# Patient Record
Sex: FEMALE | Race: WHITE | NOT HISPANIC OR LATINO | Employment: OTHER | URBAN - NONMETROPOLITAN AREA
[De-identification: names, ages, dates, MRNs, and addresses within clinical notes are randomized per-mention and may not be internally consistent; named-entity substitution may affect disease eponyms.]

---

## 2017-01-27 ENCOUNTER — HOSPITAL ENCOUNTER (EMERGENCY)
Facility: HOSPITAL | Age: 47
Discharge: HOME | End: 2017-01-27
Attending: NURSE PRACTITIONER | Admitting: NURSE PRACTITIONER
Payer: COMMERCIAL

## 2017-01-27 VITALS
OXYGEN SATURATION: 97 % | DIASTOLIC BLOOD PRESSURE: 78 MMHG | SYSTOLIC BLOOD PRESSURE: 117 MMHG | TEMPERATURE: 98.1 F | RESPIRATION RATE: 16 BRPM

## 2017-01-27 DIAGNOSIS — G44.209 TENSION HEADACHE: ICD-10-CM

## 2017-01-27 PROCEDURE — 96372 THER/PROPH/DIAG INJ SC/IM: CPT

## 2017-01-27 PROCEDURE — 25000128 H RX IP 250 OP 636: Performed by: NURSE PRACTITIONER

## 2017-01-27 PROCEDURE — 99214 OFFICE O/P EST MOD 30 MIN: CPT | Mod: 25

## 2017-01-27 PROCEDURE — 25000125 ZZHC RX 250: Performed by: NURSE PRACTITIONER

## 2017-01-27 PROCEDURE — 99213 OFFICE O/P EST LOW 20 MIN: CPT | Performed by: NURSE PRACTITIONER

## 2017-01-27 RX ORDER — DIAZEPAM 5 MG
5 TABLET ORAL EVERY 6 HOURS PRN
Qty: 5 TABLET | Refills: 0 | Status: SHIPPED | OUTPATIENT
Start: 2017-01-27 | End: 2017-02-03

## 2017-01-27 RX ORDER — ONDANSETRON 4 MG/1
4 TABLET, ORALLY DISINTEGRATING ORAL ONCE
Status: COMPLETED | OUTPATIENT
Start: 2017-01-27 | End: 2017-01-27

## 2017-01-27 RX ORDER — DIAZEPAM 10 MG/2ML
10 INJECTION, SOLUTION INTRAMUSCULAR; INTRAVENOUS ONCE
Status: COMPLETED | OUTPATIENT
Start: 2017-01-27 | End: 2017-01-27

## 2017-01-27 RX ORDER — KETOROLAC TROMETHAMINE 30 MG/ML
60 INJECTION, SOLUTION INTRAMUSCULAR; INTRAVENOUS ONCE
Status: COMPLETED | OUTPATIENT
Start: 2017-01-27 | End: 2017-01-27

## 2017-01-27 RX ORDER — DEXAMETHASONE SODIUM PHOSPHATE 10 MG/ML
10 INJECTION, SOLUTION INTRAMUSCULAR; INTRAVENOUS ONCE
Status: COMPLETED | OUTPATIENT
Start: 2017-01-27 | End: 2017-01-27

## 2017-01-27 RX ADMIN — DEXAMETHASONE SODIUM PHOSPHATE 10 MG: 10 INJECTION INTRAMUSCULAR; INTRAVENOUS at 17:11

## 2017-01-27 RX ADMIN — ONDANSETRON 4 MG: 4 TABLET, ORALLY DISINTEGRATING ORAL at 17:11

## 2017-01-27 RX ADMIN — KETOROLAC TROMETHAMINE 60 MG: 30 INJECTION, SOLUTION INTRAMUSCULAR; INTRAVENOUS at 17:12

## 2017-01-27 RX ADMIN — DIAZEPAM 10 MG: 5 INJECTION, SOLUTION INTRAMUSCULAR; INTRAVENOUS at 17:12

## 2017-01-27 ASSESSMENT — ENCOUNTER SYMPTOMS
WEAKNESS: 0
PHOTOPHOBIA: 1
CHILLS: 0
APPETITE CHANGE: 0
FEVER: 0
HEADACHES: 1
FATIGUE: 0

## 2017-01-27 NOTE — ED AVS SNAPSHOT
[facility]    [address] [address]    Phone:  [phone]                                       Dilcia Santillan   MRN: [id number]    Department:  [facility]   Date of Visit:  1/27/2017           Patient Information     Date Of Birth          1970        Your diagnoses for this visit were:     Tension headache        You were seen by Vannessa Eduardo NP.      Follow-up Information     Follow up with [facility].    Specialty:  EMERGENCY MEDICINE    Why:  As needed, If symptoms worsen, or concerns develop    Contact information:    [address] [place] [address] [phone]    Additional information:    From [place]: Take [address]. Turn left at [address]. Turn left at the first stoplight on [address]. At the first stop sign, take a right onto [address]. Take a left into the parking lot and continue through until you reach the North enterance of the building.       From [place]: Take [address]. Take the [address] ramp towards [place]. Turn left onto [address]. Take a slight right onto [address]. Turn left at the first stoplight on [address]. At the first stop sign, take a right onto [address]. Take a left into the parking lot and continue through until you reach the North enterance of the building.       From [place]: Take [address]. Take a right at [address]. At the first stop sign, take a right onto [address]. Take a left into the parking lot and continue through until you reach the North enterance of the building.         Follow up with Rubin Navarrete MD.    Specialty:  Family Practice    Why:  As needed, if symptoms do not improve    Contact information:    [facility]  [address] [facility] [address]  [phone]          Discharge Instructions         * Tension Headache    Muscle Tension Headache  "(also called \"stress headache\") is a very common cause of head pain. Under stress, some people tense the muscles of their shoulder, neck and scalp without knowing it. If this lasts long enough, a headache can occur. These headaches can be very painful and last for hours or even days.  Home Care:    If you were given pain medicine for this headache, do not drive yourself home. Arrange for a ride, instead. When you get home, try to sleep. You should feel much better when you wake up.    Heat to the back of your neck may relieve neck spasm.    Drink only clear liquids or eat a very light diet to avoid nausea/vomiting until symptoms improve.  Preventing Future Headaches    Identify the sources of stress in your life. These may not be obvious! Learn new ways to handle your stress, such as regular exercise, biofeedback, self-hypnosis and meditation. For more information about this, consult your doctor or go to a local bookstore and review the many books and tapes on this subject.    At the first sign of a tension headache, take time out if possible. Remove yourself from the stressful situation, find a quiet comfortable place to sit or lie down and let yourself relax. Heat and deep massage of the tight areas in the neck and shoulders may help reduce muscle spasm. Medicine, such as ibuprofen (Advil or Motrin) or a prescribed muscle relaxant may be helpful at this point.  Follow Up with your doctor if the headache is not better within the next 24 hours. If you have frequent headaches you should discuss a treatment plan with your primary care doctor. Ask if you can have medicine to take at home the next time you get a bad headache. This may avoid the need for a visit to the emergency department in the future. Poorly controlled chronic headaches may require a referral to a neurologist (headache specialist).  Call your Doctor Or Get Prompt Medical Attention if any of the following occur:    Worsening of your head pain or no " improvement within 24 hours    Repeated vomiting (unable to keep liquids down)    Fever of 100.4 F (38.0 C) or higher, or as directed by your healthcare provider    Stiff neck    Extreme drowsiness, confusion or fainting    Dizziness, vertigo (dizziness with spinning sensation)    Weakness of an arm or leg or one side of the face    Difficulty with speech or vision    9973-7791 [facility], [address] [place] [address], [place] [address]. All rights reserved. This information is not intended as a substitute for professional medical care. Always follow your healthcare professional's instructions.             Review of your medicines      START taking        Dose / Directions Last dose taken    diazepam 5 MG tablet   Commonly known as:  VALIUM   Dose:  5 mg   Quantity:  5 tablet        Take 1 tablet (5 mg) by mouth every 6 hours as needed for muscle spasms   Refills:  0          Our records show that you are taking the medicines listed below. If these are incorrect, please call your family doctor or clinic.        Dose / Directions Last dose taken    AXERT PO        Take  by mouth at onset of headache.   Refills:  0        FLUoxetine 20 MG capsule   Commonly known as:  PROzac        TAKE 1 CAPSULE BY MOUTH DAILY   Refills:  0        levonorgestrel-ethinyl estradiol 0.15-0.03 MG per tablet   Commonly known as:  SEASONALE        Refills:  0        TOPAMAX PO   Dose:  100 mg        Take 100 mg by mouth 2 times daily.   Refills:  0        traMADol 50 MG tablet   Commonly known as:  ULTRAM        Take three per day as needed for pain.   Refills:  0        zolpidem 12.5 MG CR tablet   Commonly known as:  AMBIEN CR   Dose:  12.5 mg        Take 12.5 mg by mouth   Refills:  0                Prescriptions were sent or printed at these locations (1 Prescription)                   [facility] - [place], [place] - [address] AT [facility] [address], [place] [address]    Telephone:  [phone]  "  Fax:  [phone]   Hours:                  Printed at Department/Unit printer (1 of 1)         diazepam (VALIUM) 5 MG tablet                Orders Needing Specimen Collection     None      Pending Results     No orders found from 2017 to 2017.            Pending Culture Results     No orders found from 2017 to 2017.            Thank you for choosing [facility]       Thank you for choosing [facility] for your care. Our goal is always to provide you with excellent care. Hearing back from our patients is one way we can continue to improve our services. Please take a few minutes to complete the written survey that you may receive in the mail after you visit with us. Thank you!        [facility] Information     [facility] lets you send messages to your doctor, view your test results, renew your prescriptions, schedule appointments and more. To sign up, go to [email or web address] . Click on \"Log in\" on the left side of the screen, which will take you to the Welcome page. Then click on \"Sign up Now\" on the right side of the page.     You will be asked to enter the access code listed below, as well as some personal information. Please follow the directions to create your username and password.     Your access code is: [email or web address]  Expires: 2017  5:37 PM     Your access code will  in 90 days. If you need help or a new code, please call your [facility] clinic or [phone].        Care EveryWhere ID     This is your Care EveryWhere ID. This could be used by other organizations to access your [facility] medical records  [id number]        After Visit Summary       This is your record. Keep this with you and show to your community pharmacist(s) and doctor(s) at your next visit.                  "

## 2017-01-27 NOTE — ED PROVIDER NOTES
History     Chief Complaint   Patient presents with     Headache     c/o migraine headache on/off week     No  was used.     Dilcia Santillan is a 46 year old female who presents today with a CC of migraine off and on x 4-5 days.  She reports this is her typical migraine symptoms, long history of migraines.  She has been under a great deal of stress lately as her daughter has been undergoing medical testing recently - they have staying hotel rooms, not eating well, etc.  She c/o tense muscles in neck.      I have reviewed the Medications, Allergies, Past Medical and Surgical History, and Social History in the Epic system.    Review of Systems   Constitutional: Negative for fever, chills, appetite change and fatigue.   Eyes: Positive for photophobia. Negative for visual disturbance.   Neurological: Positive for headaches. Negative for syncope and weakness.       Physical Exam   BP: 117/78 mmHg  Heart Rate: 85  Temp: 98.1  F (36.7  C)  Resp: 16  SpO2: 97 %    Physical Exam   Constitutional: She is oriented to person, place, and time. She appears well-developed and well-nourished. She is cooperative. She appears distressed (appears in pain, holding head).   HENT:   Head: Normocephalic and atraumatic.   Right Ear: Tympanic membrane, external ear and ear canal normal.   Left Ear: Tympanic membrane, external ear and ear canal normal.   Mouth/Throat: Uvula is midline and oropharynx is clear and moist.   Eyes: Conjunctivae are normal.   Neck: Normal range of motion. Neck supple. Muscular tenderness present. No spinous process tenderness present. Normal range of motion present.   Cardiovascular: Normal rate and regular rhythm.    Pulmonary/Chest: Effort normal and breath sounds normal.   Neurological: She is alert and oriented to person, place, and time.   Skin: Skin is warm and dry.   Psychiatric: She has a normal mood and affect. Her behavior is normal.   Nursing note and vitals reviewed.      ED  Course   Procedures    Medications   ketorolac (TORADOL) injection 60 mg (60 mg Intramuscular Given 1/27/17 1712)   diazepam (VALIUM) injection 10 mg (10 mg Intramuscular Given 1/27/17 1712)   dexamethasone (DECADRON) injection 10 mg (10 mg Intravenous Given 1/27/17 1711)   ondansetron (ZOFRAN-ODT) ODT tab 4 mg (4 mg Oral Given 1/27/17 1711)     Observed for a minimum of 20 minutes, tolerated medications well, no adverse efects noted, reports pain is 0/10 on discharge.  She was driven home by a family member       Assessments & Plan (with Medical Decision Making)     I have reviewed the nursing notes.    I have reviewed the findings, diagnosis, plan and need for follow up with the patient.  ASSESSMENT / PLAN:  (G44.209) Tension headache  Comment: resolved on discharge  Plan:  Patient verbally educated and given appropriate education sheets for their diagnoses and has no questions.   Take medications as directed.    Return to ED/UC if symptoms increase or concerns develop: red flag symptoms as discussed and per discharge instructions   Follow up with your Primary Care provider if symptoms do not improve    Discharge Medication List as of 1/27/2017  5:41 PM      START taking these medications    Details   diazepam (VALIUM) 5 MG tablet Take 1 tablet (5 mg) by mouth every 6 hours as needed for muscle spasms, Disp-5 tablet, R-0, Local Print             Final diagnoses:   Tension headache       1/27/2017   [facility]      Vannessa Eduardo NP  01/28/17 4170

## 2017-01-27 NOTE — ED NOTES
Patient came in with migraine 8/10. She has had it since Monday. Usually pain is 9/10. Patient has been taking Axert for pain, but migraine keeps creeping back. Patient has history of migraines & has done botox,accupuncture & is taking topamax

## 2017-01-27 NOTE — DISCHARGE INSTRUCTIONS
"  * Tension Headache    Muscle Tension Headache (also called \"stress headache\") is a very common cause of head pain. Under stress, some people tense the muscles of their shoulder, neck and scalp without knowing it. If this lasts long enough, a headache can occur. These headaches can be very painful and last for hours or even days.  Home Care:    If you were given pain medicine for this headache, do not drive yourself home. Arrange for a ride, instead. When you get home, try to sleep. You should feel much better when you wake up.    Heat to the back of your neck may relieve neck spasm.    Drink only clear liquids or eat a very light diet to avoid nausea/vomiting until symptoms improve.  Preventing Future Headaches    Identify the sources of stress in your life. These may not be obvious! Learn new ways to handle your stress, such as regular exercise, biofeedback, self-hypnosis and meditation. For more information about this, consult your doctor or go to a local bookstore and review the many books and tapes on this subject.    At the first sign of a tension headache, take time out if possible. Remove yourself from the stressful situation, find a quiet comfortable place to sit or lie down and let yourself relax. Heat and deep massage of the tight areas in the neck and shoulders may help reduce muscle spasm. Medicine, such as ibuprofen (Advil or Motrin) or a prescribed muscle relaxant may be helpful at this point.  Follow Up with your doctor if the headache is not better within the next 24 hours. If you have frequent headaches you should discuss a treatment plan with your primary care doctor. Ask if you can have medicine to take at home the next time you get a bad headache. This may avoid the need for a visit to the emergency department in the future. Poorly controlled chronic headaches may require a referral to a neurologist (headache specialist).  Call your Doctor Or Get Prompt Medical Attention if any of the following " occur:    Worsening of your head pain or no improvement within 24 hours    Repeated vomiting (unable to keep liquids down)    Fever of 100.4 F (38.0 C) or higher, or as directed by your healthcare provider    Stiff neck    Extreme drowsiness, confusion or fainting    Dizziness, vertigo (dizziness with spinning sensation)    Weakness of an arm or leg or one side of the face    Difficulty with speech or vision    1738-8027 [employer] [address], [place] [id number]. All rights reserved. This information is not intended as a substitute for professional medical care. Always follow your healthcare professional's instructions.

## 2017-01-27 NOTE — ED AVS SNAPSHOT
[facility]    [address] [place] [address]    Phone:  [phone]                                       Dilcia Santillan   MRN: [id number]    Department[facility]  [facility]   Date of Visit:  1/27/2017           After Visit Summary Signature Page     I have received my discharge instructions, and my questions have been answered. I have discussed any challenges I see with this plan with the nurse or doctor.    ..........................................................................................................................................  Patient/Patient Representative Signature      ..........................................................................................................................................  Patient Representative Print Name and Relationship to Patient    ..................................................               ................................................  Date                                            Time    ..........................................................................................................................................  Reviewed by Signature/Title    ...................................................              ..............................................  Date                                                            Time

## 2017-09-20 ENCOUNTER — HOSPITAL ENCOUNTER (EMERGENCY)
Facility: HOSPITAL | Age: 47
Discharge: HOME | End: 2017-09-20
Attending: NURSE PRACTITIONER | Admitting: NURSE PRACTITIONER
Payer: COMMERCIAL

## 2017-09-20 VITALS
TEMPERATURE: 99.2 F | RESPIRATION RATE: 16 BRPM | OXYGEN SATURATION: 98 % | DIASTOLIC BLOOD PRESSURE: 73 MMHG | HEART RATE: 78 BPM | SYSTOLIC BLOOD PRESSURE: 109 MMHG

## 2017-09-20 DIAGNOSIS — G43.009 MIGRAINE WITHOUT AURA AND WITHOUT STATUS MIGRAINOSUS, NOT INTRACTABLE: ICD-10-CM

## 2017-09-20 DIAGNOSIS — M62.838 NECK MUSCLE SPASM: ICD-10-CM

## 2017-09-20 PROCEDURE — 99213 OFFICE O/P EST LOW 20 MIN: CPT | Performed by: NURSE PRACTITIONER

## 2017-09-20 PROCEDURE — 96372 THER/PROPH/DIAG INJ SC/IM: CPT

## 2017-09-20 PROCEDURE — 25000128 H RX IP 250 OP 636: Performed by: NURSE PRACTITIONER

## 2017-09-20 PROCEDURE — 99214 OFFICE O/P EST MOD 30 MIN: CPT | Mod: 25

## 2017-09-20 RX ORDER — CYCLOBENZAPRINE HCL 10 MG
10 TABLET ORAL 3 TIMES DAILY PRN
Qty: 10 TABLET | Refills: 0 | Status: SHIPPED | OUTPATIENT
Start: 2017-09-20 | End: 2017-10-26

## 2017-09-20 RX ORDER — KETOROLAC TROMETHAMINE 30 MG/ML
60 INJECTION, SOLUTION INTRAMUSCULAR; INTRAVENOUS ONCE
Status: COMPLETED | OUTPATIENT
Start: 2017-09-20 | End: 2017-09-20

## 2017-09-20 RX ORDER — DIAZEPAM 10 MG/2ML
10 INJECTION, SOLUTION INTRAMUSCULAR; INTRAVENOUS ONCE
Status: COMPLETED | OUTPATIENT
Start: 2017-09-20 | End: 2017-09-20

## 2017-09-20 RX ADMIN — PROCHLORPERAZINE EDISYLATE 10 MG: 5 INJECTION INTRAMUSCULAR; INTRAVENOUS at 16:59

## 2017-09-20 RX ADMIN — KETOROLAC TROMETHAMINE 60 MG: 30 INJECTION, SOLUTION INTRAMUSCULAR; INTRAVENOUS at 16:59

## 2017-09-20 RX ADMIN — DIAZEPAM 10 MG: 5 INJECTION, SOLUTION INTRAMUSCULAR; INTRAVENOUS at 16:58

## 2017-09-20 NOTE — ED NOTES
Pt states she is feeling much better, discharge instructions reviewed including the need to wait until tomorrow before starting the flexeril.  Pain 4/10. Pt states she plans on going home and sleeping.

## 2017-09-20 NOTE — ED AVS SNAPSHOT
[facility]    [address] [place] [address]    Phone:  [phone]                                       Dilcia Santillan   MRN: [id number]    Department[facility]   Date of Visit:  9/20/2017           After Visit Summary Signature Page     I have received my discharge instructions, and my questions have been answered. I have discussed any challenges I see with this plan with the nurse or doctor.    ..........................................................................................................................................  Patient/Patient Representative Signature      ..........................................................................................................................................  Patient Representative Print Name and Relationship to Patient    ..................................................               ................................................  Date                                            Time    ..........................................................................................................................................  Reviewed by Signature/Title    ...................................................              ..............................................  Date                                                            Time

## 2017-09-20 NOTE — ED PROVIDER NOTES
"  History     Chief Complaint   Patient presents with     Headache     The history is provided by the patient. No  was used.     Dilcia Santillan is a 47 year old female who presents with a headache and right sided upper back/neck pain that started this am. 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 \"Feels like a muscle spasm on right side of neck.\" She has a history of migraines. She's taken Axert X2 doses today with mild effectiveness. Denies injury or trauma to head or neck. Denies vision changes or aura. Denies fever, chills, or night sweats. Eating and drinking well. Bowel and bladder are working well.     I have reviewed the Medications, Allergies, Past Medical and Surgical History, and Social History in the Epic system.      Review of Systems   Constitutional: Positive for activity change. Negative for appetite change, chills and fever.   HENT: Negative for congestion, ear discharge, ear pain, postnasal drip, rhinorrhea, sinus pain and trouble swallowing.    Eyes: Positive for photophobia. Negative for visual disturbance.   Respiratory: Negative for cough.    Cardiovascular: Negative for chest pain.   Gastrointestinal: Negative for abdominal pain, diarrhea, nausea and vomiting.   Genitourinary: Negative for dysuria.   Musculoskeletal: Positive for back pain and neck pain.   Skin: Negative for rash.        Right sided upper back/neck pain.    Neurological: Positive for headaches. Negative for dizziness, tremors, syncope, facial asymmetry, speech difficulty, weakness, light-headedness and numbness.   Psychiatric/Behavioral: Negative.        Physical Exam   BP: 137/90. Repeat 109/73  Pulse: 80. Repeat 78  Temp: 99.2  F (37.3  C)  Resp: 20  SpO2: 99 %  Physical Exam   Constitutional: She is oriented to person, place, and time. She appears well-developed and well-nourished. No distress.   HENT:   Head: Normocephalic.   Right Ear: External ear normal.   Left " Ear: External ear normal.   Mouth/Throat: Oropharynx is clear and moist.   Eyes: Conjunctivae and EOM are normal. Pupils are equal, round, and reactive to light.   Neck: Normal range of motion. Neck supple.   Cardiovascular: Normal rate, regular rhythm, normal heart sounds and intact distal pulses.    No murmur heard.  Pulmonary/Chest: Effort normal. No respiratory distress. She has no wheezes. She has no rales.   Abdominal: Soft. She exhibits no distension.   Musculoskeletal: Normal range of motion.   CMS and ROM intact to all extremities. Distal pulses intact. NO step offs or TTP to spinal column. Pain reproducible to right rhoboid region.    Lymphadenopathy:     She has no cervical adenopathy.   Neurological: She is alert and oriented to person, place, and time. She has normal reflexes. She displays normal reflexes. No cranial nerve deficit. She exhibits normal muscle tone. Coordination normal.   Skin: Skin is warm and dry. She is not diaphoretic.   No rash, erythema, bruising, or warmth to touch to posterior back.    Psychiatric: She has a normal mood and affect. Her behavior is normal.   Nursing note and vitals reviewed.      ED Course     ED Course     Procedures    Medications   ketorolac (TORADOL) injection 60 mg (60 mg Intramuscular Given 9/20/17 1659)   diazepam (VALIUM) injection 10 mg (10 mg Intramuscular Given 9/20/17 1658)   prochlorperazine (COMPAZINE) injection 10 mg (10 mg Intramuscular Given 9/20/17 1659)     Monitored for 20 minutes and tolerated well. Pain 3/10. She feels much better. Her  is here and will be driving her home.     Assessments & Plan (with Medical Decision Making)     Discussed plan of care. She verbalized understanding. All questions answered.     I have reviewed the nursing notes.    I have reviewed the findings, diagnosis, plan and need for follow up with the patient.  Discharged in stable condition.     New Prescriptions    CYCLOBENZAPRINE (FLEXERIL) 10 MG TABLET     Take 1 tablet (10 mg) by mouth 3 times daily as needed for muscle spasms       Final diagnoses:   Neck muscle spasm   Migraine without aura and without status migrainosus, not intractable     Continue Axert as directed for migraines.   Use Flexeril as directed as needed for neck spasm.   Apply heating pad to neck. Protect skin.   Follow up with PCP with any increase in symptoms or concerns.   Return to urgent care or emergency department with any increase in symptoms or concerns.     HERMELINDA Barba  9/20/2017  4:13 PM  [facility]       Shannan Argueta NP  09/23/17 1947

## 2017-09-20 NOTE — ED NOTES
Pt ambulated into room independently, pt's  is in waiting room. Pt states muscle spasms started yesterday in her neck, pt woke up with migraine. Pain 8 to 9/10 even after Axert 2 doses taken today.

## 2017-09-20 NOTE — ED AVS SNAPSHOT
[facility]    [address]    [place] [address]    Phone:  [phone]                                       Dlicia Santillan   MRN: [id number]    Department:  [facility]   Date of Visit:  9/20/2017           Patient Information     Date Of Birth          1970        Your diagnoses for this visit were:     Neck muscle spasm     Migraine without aura and without status migrainosus, not intractable        You were seen by Shannan Argueta NP.      Follow-up Information     Follow up with uRbin Navarrete MD.    Specialty:  Family Practice    Why:  As needed, If symptoms worsen    Contact information:    [facility]  [address] [place] [address] [phone]          Follow up with [facility].    Specialty:  EMERGENCY MEDICINE    Why:  As needed, If symptoms worsen    Contact information:    [address] [place] [address] [phone]    Additional information:    From [facility]: Take [address]. Turn left at [address]. Turn left at the first stoplight on [address]. At the first stop sign, take a right onto [address]. Take a left into the parking lot and continue through until you reach the North enterance of the building.       From [place]: Take [address]. Take the [address] ramp towards [place]. Turn left onto [address]. Take a slight right onto [address]. Turn left at the first stoplight on [address]. At the first stop sign, take a right onto [address]. Take a left into the parking lot and continue through until you reach the North enterance of the building.       From [place]: Take [address]. Take a right at [address]. At the first stop sign, take a right onto [address]. Take a left into the parking lot and continue through until you reach the North enterance of the building.         Discharge Instructions        Continue Axert as directed for migraines.   Use Flexeril as directed as needed for neck spasm.   Apply heating pad to neck. Protect skin.   Follow up with PCP with any increase in symptoms or concerns.   Return to urgent care or emergency department with any increase in symptoms or concerns.     Discharge References/Attachments     NECK SPASM, NO TRAUMA (ENGLISH)    HEADACHE, MIGRAINE (CLASSICAL) (ENGLISH)         Review of your medicines      START taking        Dose / Directions Last dose taken    cyclobenzaprine 10 MG tablet   Commonly known as:  FLEXERIL   Dose:  10 mg   Quantity:  10 tablet        Take 1 tablet (10 mg) by mouth 3 times daily as needed for muscle spasms   Refills:  0          Our records show that you are taking the medicines listed below. If these are incorrect, please call your family doctor or clinic.        Dose / Directions Last dose taken    AXERT PO        Take  by mouth at onset of headache.   Refills:  0        FLUoxetine 20 MG capsule   Commonly known as:  PROzac        TAKE 1 CAPSULE BY MOUTH DAILY   Refills:  0        levonorgestrel-ethinyl estradiol 0.15-0.03 MG per tablet   Commonly known as:  SEASONALE        Refills:  0        TOPAMAX PO   Dose:  100 mg        Take 100 mg by mouth 2 times daily.   Refills:  0        traMADol 50 MG tablet   Commonly known as:  ULTRAM        Take three per day as needed for pain.   Refills:  0        zolpidem 12.5 MG CR tablet   Commonly known as:  AMBIEN CR   Dose:  12.5 mg        Take 12.5 mg by mouth   Refills:  0                Prescriptions were sent or printed at these locations (1 Prescription)                   [facility] [address] [place] [address] [place] - [address] AT [facility] [address] [address]    Telephone:  [phone]   Fax:  [phone]   Hours:                  E-Prescribed (1 of 1)         cyclobenzaprine (FLEXERIL) 10 MG tablet                Orders Needing Specimen Collection     None     "  Pending Results     No orders found from 2017 to 2017.            Pending Culture Results     No orders found from 2017 to 2017.            Thank you for choosing [facility]       Thank you for choosing [facility] for your care. Our goal is always to provide you with excellent care. Hearing back from our patients is one way we can continue to improve our services. Please take a few minutes to complete the written survey that you may receive in the mail after you visit with us. Thank you!        [employer] Information     [employer] lets you send messages to your doctor, view your test results, renew your prescriptions, schedule appointments and more. To sign up, go to [email or web address] . Click on \"Log in\" on the left side of the screen, which will take you to the Welcome page. Then click on \"Sign up Now\" on the right side of the page.     You will be asked to enter the access code listed below, as well as some personal information. Please follow the directions to create your username and password.     Your access code is: [email or web address]  Expires: 2017  5:30 PM     Your access code will  in 90 days. If you need help or a new code, please call your [facility] clinic or [phone].        Care EveryWhere ID     This is your Care EveryWhere ID. This could be used by other organizations to access your [facility] medical records  [id number]        Equal Access to Services     [facility] : Hadii abdifatah joseph Sorocco, waaxda luqadaha, qaybta kaalmada brenda, gutierrez quiles . So [facility] [phone].    ATENCIÓN: Si habla español, tiene a ram disposición servicios gratuitos de asistencia lingüística. Llame al [phone].    We comply with applicable federal civil rights laws and [place] laws. We do not discriminate on the basis of race, color, national origin, age, disability sex, sexual orientation or gender identity.            After Visit Summary       This is " your record. Keep this with you and show to your community pharmacist(s) and doctor(s) at your next visit.

## 2017-09-23 ASSESSMENT — ENCOUNTER SYMPTOMS
BACK PAIN: 1
APPETITE CHANGE: 0
ACTIVITY CHANGE: 1
LIGHT-HEADEDNESS: 0
SINUS PAIN: 0
DIARRHEA: 0
SPEECH DIFFICULTY: 0
CHILLS: 0
DIZZINESS: 0
NUMBNESS: 0
COUGH: 0
VOMITING: 0
FACIAL ASYMMETRY: 0
HEADACHES: 1
NECK PAIN: 1
NAUSEA: 0
ABDOMINAL PAIN: 0
WEAKNESS: 0
TREMORS: 0
PHOTOPHOBIA: 1
RHINORRHEA: 0
FEVER: 0
DYSURIA: 0
TROUBLE SWALLOWING: 0
PSYCHIATRIC NEGATIVE: 1

## 2017-10-26 ENCOUNTER — APPOINTMENT (OUTPATIENT)
Dept: CT IMAGING | Facility: HOSPITAL | Age: 47
End: 2017-10-26
Attending: EMERGENCY MEDICINE
Payer: COMMERCIAL

## 2017-10-26 ENCOUNTER — HOSPITAL ENCOUNTER (EMERGENCY)
Facility: HOSPITAL | Age: 47
Discharge: ANOTHER HOSPITAL | End: 2017-10-26
Attending: EMERGENCY MEDICINE | Admitting: EMERGENCY MEDICINE
Payer: COMMERCIAL

## 2017-10-26 ENCOUNTER — TRANSFERRED RECORDS (OUTPATIENT)
Dept: HEALTH INFORMATION MANAGEMENT | Facility: HOSPITAL | Age: 47
End: 2017-10-26

## 2017-10-26 VITALS
OXYGEN SATURATION: 100 % | HEART RATE: 86 BPM | BODY MASS INDEX: 21.97 KG/M2 | HEIGHT: 67 IN | RESPIRATION RATE: 16 BRPM | WEIGHT: 140 LBS | SYSTOLIC BLOOD PRESSURE: 119 MMHG | DIASTOLIC BLOOD PRESSURE: 82 MMHG | TEMPERATURE: 99.5 F

## 2017-10-26 DIAGNOSIS — H57.9 EYE PRESSURE: Primary | ICD-10-CM

## 2017-10-26 LAB
ALBUMIN SERPL-MCNC: 3.8 G/DL (ref 3.4–5)
ALP SERPL-CCNC: 70 U/L (ref 40–150)
ALT SERPL W P-5'-P-CCNC: 28 U/L (ref 0–50)
ANION GAP SERPL CALCULATED.3IONS-SCNC: 9 MMOL/L (ref 3–14)
AST SERPL W P-5'-P-CCNC: 13 U/L (ref 0–45)
BASOPHILS # BLD AUTO: 0.1 10E9/L (ref 0–0.2)
BASOPHILS NFR BLD AUTO: 1.1 %
BILIRUB SERPL-MCNC: 0.5 MG/DL (ref 0.2–1.3)
BUN SERPL-MCNC: 8 MG/DL (ref 7–30)
CALCIUM SERPL-MCNC: 8.6 MG/DL (ref 8.5–10.1)
CHLORIDE SERPL-SCNC: 112 MMOL/L (ref 94–109)
CO2 SERPL-SCNC: 20 MMOL/L (ref 20–32)
CREAT SERPL-MCNC: 0.78 MG/DL (ref 0.52–1.04)
DIFFERENTIAL METHOD BLD: NORMAL
EOSINOPHIL # BLD AUTO: 0.1 10E9/L (ref 0–0.7)
EOSINOPHIL NFR BLD AUTO: 1.6 %
ERYTHROCYTE [DISTWIDTH] IN BLOOD BY AUTOMATED COUNT: 12 % (ref 10–15)
GFR SERPL CREATININE-BSD FRML MDRD: 79 ML/MIN/1.7M2
GLUCOSE SERPL-MCNC: 88 MG/DL (ref 70–99)
HCT VFR BLD AUTO: 42.4 % (ref 35–47)
HGB BLD-MCNC: 15 G/DL (ref 11.7–15.7)
IMM GRANULOCYTES # BLD: 0 10E9/L (ref 0–0.4)
IMM GRANULOCYTES NFR BLD: 0.5 %
LYMPHOCYTES # BLD AUTO: 1.2 10E9/L (ref 0.8–5.3)
LYMPHOCYTES NFR BLD AUTO: 28 %
MCH RBC QN AUTO: 31.8 PG (ref 26.5–33)
MCHC RBC AUTO-ENTMCNC: 35.4 G/DL (ref 31.5–36.5)
MCV RBC AUTO: 90 FL (ref 78–100)
MONOCYTES # BLD AUTO: 0.3 10E9/L (ref 0–1.3)
MONOCYTES NFR BLD AUTO: 6.7 %
NEUTROPHILS # BLD AUTO: 2.7 10E9/L (ref 1.6–8.3)
NEUTROPHILS NFR BLD AUTO: 62.1 %
NRBC # BLD AUTO: 0 10*3/UL
NRBC BLD AUTO-RTO: 0 /100
PLATELET # BLD AUTO: 269 10E9/L (ref 150–450)
POTASSIUM SERPL-SCNC: 3.5 MMOL/L (ref 3.4–5.3)
PROT SERPL-MCNC: 7.4 G/DL (ref 6.8–8.8)
RBC # BLD AUTO: 4.72 10E12/L (ref 3.8–5.2)
SODIUM SERPL-SCNC: 141 MMOL/L (ref 133–144)
WBC # BLD AUTO: 4.4 10E9/L (ref 4–11)

## 2017-10-26 PROCEDURE — 99285 EMERGENCY DEPT VISIT HI MDM: CPT | Performed by: EMERGENCY MEDICINE

## 2017-10-26 PROCEDURE — 25000128 H RX IP 250 OP 636: Performed by: EMERGENCY MEDICINE

## 2017-10-26 PROCEDURE — 96361 HYDRATE IV INFUSION ADD-ON: CPT

## 2017-10-26 PROCEDURE — 96375 TX/PRO/DX INJ NEW DRUG ADDON: CPT

## 2017-10-26 PROCEDURE — 85025 COMPLETE CBC W/AUTO DIFF WBC: CPT | Performed by: EMERGENCY MEDICINE

## 2017-10-26 PROCEDURE — 99285 EMERGENCY DEPT VISIT HI MDM: CPT | Mod: 25

## 2017-10-26 PROCEDURE — 80053 COMPREHEN METABOLIC PANEL: CPT | Performed by: EMERGENCY MEDICINE

## 2017-10-26 PROCEDURE — 96374 THER/PROPH/DIAG INJ IV PUSH: CPT

## 2017-10-26 PROCEDURE — 70450 CT HEAD/BRAIN W/O DYE: CPT | Mod: TC

## 2017-10-26 PROCEDURE — 36415 COLL VENOUS BLD VENIPUNCTURE: CPT | Performed by: EMERGENCY MEDICINE

## 2017-10-26 RX ORDER — DIPHENHYDRAMINE HYDROCHLORIDE 50 MG/ML
25 INJECTION INTRAMUSCULAR; INTRAVENOUS ONCE
Status: COMPLETED | OUTPATIENT
Start: 2017-10-26 | End: 2017-10-26

## 2017-10-26 RX ORDER — KETOROLAC TROMETHAMINE 30 MG/ML
30 INJECTION, SOLUTION INTRAMUSCULAR; INTRAVENOUS ONCE
Status: COMPLETED | OUTPATIENT
Start: 2017-10-26 | End: 2017-10-26

## 2017-10-26 RX ORDER — ONDANSETRON 2 MG/ML
4 INJECTION INTRAMUSCULAR; INTRAVENOUS ONCE
Status: COMPLETED | OUTPATIENT
Start: 2017-10-26 | End: 2017-10-26

## 2017-10-26 RX ADMIN — KETOROLAC TROMETHAMINE 30 MG: 30 INJECTION, SOLUTION INTRAMUSCULAR at 10:00

## 2017-10-26 RX ADMIN — ONDANSETRON 4 MG: 2 INJECTION INTRAMUSCULAR; INTRAVENOUS at 10:01

## 2017-10-26 RX ADMIN — DIPHENHYDRAMINE HYDROCHLORIDE 25 MG: 50 INJECTION, SOLUTION INTRAMUSCULAR; INTRAVENOUS at 09:58

## 2017-10-26 RX ADMIN — SODIUM CHLORIDE 1000 ML: 9 INJECTION, SOLUTION INTRAVENOUS at 09:57

## 2017-10-26 NOTE — ED PROVIDER NOTES
"  History     Chief Complaint   Patient presents with     visual disturbance     \"pressure behind both eyes, left greater than right, vision changes for a couple weeks, eyes bloodshot and pupils dilated\"     Headache     \"hx of migraines, headache since yesterday\"      HPI  Dilcia Santillan is a 47 year old female who presents to the ED accompanied by the significant other.  Patient gives 1 day history of pressure in both eyes especially worse on the left than right.  She has also noted headache, photophobia, nausea but no vomiting, double vision noted during examination of the ED.  The headache and pressure in the size of this progressively worsened over the last 24 hours.  Denies any head trauma, fever, neck pains, neck stiffness, vomiting, unilateral body weakness or recent travel.    Problem List:    There are no active problems to display for this patient.       Past Medical History:    Past Medical History:   Diagnosis Date     Migraines        Past Surgical History:    No past surgical history on file.    Family History:    No family history on file.    Social History:  Marital Status:   [2]  Social History   Substance Use Topics     Smoking status: Never Smoker     Smokeless tobacco: Never Used     Alcohol use No        Medications:      FLUoxetine (PROZAC) 20 MG capsule   levonorgestrel-ethinyl estradiol (SEASONALE) 0.15-0.03 MG per tablet   traMADol (ULTRAM) 50 MG tablet   zolpidem (AMBIEN CR) 12.5 MG CR tablet   Topiramate (TOPAMAX PO)   Almotriptan Malate (AXERT PO)         Review of Systems   HENT:        Bilateral eye pressure   All other systems reviewed and are negative.      Physical Exam   BP: (!) 113/43  Pulse: 86  Heart Rate: 79  Temp: 99.5  F (37.5  C)  Resp: 16  Height: 170.2 cm (5' 7\")  Weight: 63.5 kg (140 lb)  SpO2: 100 %      Physical Exam   Constitutional: She is oriented to person, place, and time. She appears well-developed and well-nourished. No distress.   HENT:   Head: " Normocephalic and atraumatic.   Mouth/Throat: Oropharynx is clear and moist. No oropharyngeal exudate.   Eyes: Pupils are equal, round, and reactive to light. No scleral icterus.   Cardiovascular: Normal rate, regular rhythm, normal heart sounds and intact distal pulses.    Pulmonary/Chest: Breath sounds normal. No respiratory distress. She has no wheezes. She has no rales.   Abdominal: Soft. Bowel sounds are normal. There is no tenderness. There is no rebound and no guarding.   Musculoskeletal: She exhibits no edema or tenderness.   Neurological: She is alert and oriented to person, place, and time.   Skin: Skin is warm. No rash noted. She is not diaphoretic.   Nursing note and vitals reviewed.      ED Course   Patient evaluated and laboratory tests ordered.  IV Zofran, Benadryl and Toradol given for pain control.  Started on IV fluids.  CT scan of the brain ordered.    ED Course     Procedures         Labs Ordered and Resulted from Time of ED Arrival Up to the Time of Departure from the ED   COMPREHENSIVE METABOLIC PANEL - Abnormal; Notable for the following:        Result Value    Chloride 112 (*)     All other components within normal limits   CBC WITH PLATELETS DIFFERENTIAL       Assessments & Plan (with Medical Decision Making)   Bilateral eye pressure: Normal CBC, CMP and CT scan of the head.  Normal evaluation of cranial nerves at the ED.  Treated with IV Dilaudid and Zofran with some relief of the headache and eye pressure.  The patient though still persists despite above treatment.  Will refer to ophthalmologist at [facility]. Dr Banks accepting.    I have reviewed the nursing notes.    I have reviewed the findings, diagnosis, plan and need for follow up with the patient.    New Prescriptions    No medications on file       Final diagnoses:   Eye pressure       10/26/2017   [facility]     Teddy Tolliver MD  10/26/17 9491

## 2017-10-26 NOTE — ED NOTES
"Presents to ER with c/o increasing \"eye pressure for past couple weeks; left greater than right.\" Pupils dilated upon exam and blood shot.\" Does have history of migraines and \"this doesn't feel like a migraine.\" Also reports that her vision has been blurry for past couple weeks as well. Describes it as a pressure. See assessments. Call light placed within reach.   "

## 2017-10-26 NOTE — ED NOTES
Pt and spouse both aware of plan to transfer for opthalmologist for further car, understanding to remain NPO in case of need for surgery.

## 2017-10-26 NOTE — ED NOTES
[facility] to go down with pt.

## 2017-10-26 NOTE — ED NOTES
Pt is going to [facility] ER. Number for nurse to nurse report is [phone].

## 2017-12-21 ENCOUNTER — HOSPITAL ENCOUNTER (OUTPATIENT)
Dept: MRI IMAGING | Facility: HOSPITAL | Age: 47
Discharge: HOME | End: 2017-12-21
Attending: FAMILY MEDICINE | Admitting: FAMILY MEDICINE
Payer: COMMERCIAL

## 2017-12-21 DIAGNOSIS — G89.29 CHRONIC LOW BACK PAIN: ICD-10-CM

## 2017-12-21 DIAGNOSIS — G89.29 CHRONIC LOW BACK PAIN WITHOUT SCIATICA: ICD-10-CM

## 2017-12-21 DIAGNOSIS — M54.50 CHRONIC LOW BACK PAIN: ICD-10-CM

## 2017-12-21 DIAGNOSIS — M54.50 CHRONIC LOW BACK PAIN WITHOUT SCIATICA: ICD-10-CM

## 2017-12-21 PROCEDURE — 72148 MRI LUMBAR SPINE W/O DYE: CPT | Mod: TC

## 2018-01-20 ENCOUNTER — HOSPITAL ENCOUNTER (EMERGENCY)
Facility: HOSPITAL | Age: 48
Discharge: HOME | End: 2018-01-20
Attending: PHYSICIAN ASSISTANT | Admitting: PHYSICIAN ASSISTANT
Payer: COMMERCIAL

## 2018-01-20 VITALS
SYSTOLIC BLOOD PRESSURE: 105 MMHG | HEART RATE: 62 BPM | RESPIRATION RATE: 16 BRPM | DIASTOLIC BLOOD PRESSURE: 55 MMHG | TEMPERATURE: 97.1 F | OXYGEN SATURATION: 100 %

## 2018-01-20 DIAGNOSIS — G43.919 INTRACTABLE MIGRAINE WITHOUT STATUS MIGRAINOSUS, UNSPECIFIED MIGRAINE TYPE: ICD-10-CM

## 2018-01-20 DIAGNOSIS — M54.2 CERVICALGIA: ICD-10-CM

## 2018-01-20 PROCEDURE — 99213 OFFICE O/P EST LOW 20 MIN: CPT | Performed by: PHYSICIAN ASSISTANT

## 2018-01-20 PROCEDURE — G0463 HOSPITAL OUTPT CLINIC VISIT: HCPCS | Mod: 25

## 2018-01-20 PROCEDURE — 96375 TX/PRO/DX INJ NEW DRUG ADDON: CPT

## 2018-01-20 PROCEDURE — 25000128 H RX IP 250 OP 636: Performed by: PHYSICIAN ASSISTANT

## 2018-01-20 PROCEDURE — 96361 HYDRATE IV INFUSION ADD-ON: CPT

## 2018-01-20 PROCEDURE — 96374 THER/PROPH/DIAG INJ IV PUSH: CPT

## 2018-01-20 RX ORDER — KETOROLAC TROMETHAMINE 30 MG/ML
60 INJECTION, SOLUTION INTRAMUSCULAR; INTRAVENOUS ONCE
Status: COMPLETED | OUTPATIENT
Start: 2018-01-20 | End: 2018-01-20

## 2018-01-20 RX ORDER — DIPHENHYDRAMINE HYDROCHLORIDE 50 MG/ML
25 INJECTION INTRAMUSCULAR; INTRAVENOUS ONCE
Status: COMPLETED | OUTPATIENT
Start: 2018-01-20 | End: 2018-01-20

## 2018-01-20 RX ORDER — DEXAMETHASONE SODIUM PHOSPHATE 10 MG/ML
10 INJECTION, SOLUTION INTRAMUSCULAR; INTRAVENOUS ONCE
Status: COMPLETED | OUTPATIENT
Start: 2018-01-20 | End: 2018-01-20

## 2018-01-20 RX ORDER — CYCLOBENZAPRINE HCL 10 MG
5-10 TABLET ORAL 3 TIMES DAILY PRN
Qty: 30 TABLET | Refills: 1 | Status: ON HOLD | OUTPATIENT
Start: 2018-01-20 | End: 2020-03-30

## 2018-01-20 RX ADMIN — KETOROLAC TROMETHAMINE 60 MG: 30 INJECTION, SOLUTION INTRAMUSCULAR at 19:51

## 2018-01-20 RX ADMIN — PROCHLORPERAZINE EDISYLATE 10 MG: 5 INJECTION INTRAMUSCULAR; INTRAVENOUS at 20:46

## 2018-01-20 RX ADMIN — SODIUM CHLORIDE 1000 ML: 9 INJECTION, SOLUTION INTRAVENOUS at 20:33

## 2018-01-20 RX ADMIN — DIPHENHYDRAMINE HYDROCHLORIDE 25 MG: 50 INJECTION, SOLUTION INTRAMUSCULAR; INTRAVENOUS at 20:40

## 2018-01-20 RX ADMIN — DEXAMETHASONE SODIUM PHOSPHATE 10 MG: 10 INJECTION, SOLUTION INTRAMUSCULAR; INTRAVENOUS at 20:43

## 2018-01-20 ASSESSMENT — ENCOUNTER SYMPTOMS
PSYCHIATRIC NEGATIVE: 1
SORE THROAT: 0
CHILLS: 0
CONSTITUTIONAL NEGATIVE: 1
VOMITING: 0
RESPIRATORY NEGATIVE: 1
FEVER: 0
COUGH: 0
NAUSEA: 1
PHOTOPHOBIA: 1
SINUS PRESSURE: 0
HEADACHES: 1
CARDIOVASCULAR NEGATIVE: 1

## 2018-01-20 NOTE — ED AVS SNAPSHOT
[facility]    [address] [place] [address]    Phone:  [phone]                                       Dilcia Santillan   MRN: [id number]    Department:  [facility]   Date of Visit:  1/20/2018           Patient Information     Date Of Birth          1970        Your diagnoses for this visit were:     Cervicalgia     Intractable migraine without status migrainosus, unspecified migraine type        You were seen by Figueroa Day PA.      Follow-up Information     Follow up with Rubin Navarrete MD.    Specialty:  Family Practice    Why:  early next week to recheck    Contact information:    [facility] [place]  [address]  [place] [address] [phone]          Follow up with [facility].    Specialty:  EMERGENCY MEDICINE    Why:  If symptoms worsen    Contact information:    [address] [place] [address] [phone]    Additional information:    From [place]: Take [address]. Turn left at [address]. Turn left at the first stoplight on [address]. At the first stop sign, take a right onto [address]. Take a left into the parking lot and continue through until you reach the North enterance of the building.       From [place]: Take [address]. Take the [address] ramp towards [place]. Turn left onto [address]. Take a slight right onto [address]. Turn left at the first stoplight on [address]. At the first stop sign, take a right onto [address]. Take a left into the parking lot and continue through until you reach the North enterance of the building.       From [place]: Take [address]. Take a right at [address]. At the first stop sign, take a right onto [address]. Take a left into the parking lot and continue through until you reach the North enterance of the building.         Discharge Instructions       - Rest, HEAT for the  neck (bring blood flow to the area so spasm is not so bad)  - I ordered flexeril as there is a drug-drug interaction with zanaflex.   - stretch, acupuncture, chiropractor, physical therapy, massage therapy for neck.   - FYI Topicals for the neck: Arnica Cream (5$ at [facility]) and/or Capsaicin. (1/4 teaspoon cayenne pepper in a palmful olive oil and rub in 2-3 times daily for 5-7 days for pain)  * Back here with worst headache of life, fevers, return of headache unable to control at home.     Discharge References/Attachments     NECK PAIN (ENGLISH)    HEADACHE, MIGRAINE (CLASSICAL) (ENGLISH)         Review of your medicines      START taking        Dose / Directions Last dose taken    cyclobenzaprine 10 MG tablet   Commonly known as:  FLEXERIL   Dose:  5-10 mg   Quantity:  30 tablet        Take 0.5-1 tablets (5-10 mg) by mouth 3 times daily as needed for muscle spasms   Refills:  1          Our records show that you are taking the medicines listed below. If these are incorrect, please call your family doctor or clinic.        Dose / Directions Last dose taken    AXERT PO        Take  by mouth at onset of headache.   Refills:  0        FLUoxetine 20 MG capsule   Commonly known as:  PROzac        TAKE 1 CAPSULE BY MOUTH DAILY   Refills:  0        levonorgestrel-ethinyl estradiol 0.15-0.03 MG per tablet   Commonly known as:  SEASONALE        Refills:  0        TOPAMAX PO   Dose:  100 mg        Take 100 mg by mouth 2 times daily.   Refills:  0        traMADol 50 MG tablet   Commonly known as:  ULTRAM        Take three per day as needed for pain.   Refills:  0        zolpidem 12.5 MG CR tablet   Commonly known as:  AMBIEN CR   Dose:  12.5 mg        Take 12.5 mg by mouth   Refills:  0                Prescriptions were sent or printed at these locations (1 Prescription)                   [facility] [id number] - [place] - [address] AT [facility] of [address] [address]    Telephone:   "[phone]   Fax:  [phone]   Hours:                  E-Prescribed (1 of 1)         cyclobenzaprine (FLEXERIL) 10 MG tablet                Orders Needing Specimen Collection     None      Pending Results     No orders found from 2018 to 2018.            Pending Culture Results     No orders found from 2018 to 2018.            Thank you for choosing [facility]       Thank you for choosing [facility] for your care. Our goal is always to provide you with excellent care. Hearing back from our patients is one way we can continue to improve our services. Please take a few minutes to complete the written survey that you may receive in the mail after you visit with us. Thank you!        [employer] Information     [employer] lets you send messages to your doctor, view your test results, renew your prescriptions, schedule appointments and more. To sign up, go to [email or web address] . Click on \"Log in\" on the left side of the screen, which will take you to the Welcome page. Then click on \"Sign up Now\" on the right side of the page.     You will be asked to enter the access code listed below, as well as some personal information. Please follow the directions to create your username and password.     Your access code is: [email or web address]  Expires: 2018  9:09 PM     Your access code will  in 90 days. If you need help or a new code, please call your [facility] clinic or [phone].        Care EveryWhere ID     This is your Care EveryWhere ID. This could be used by other organizations to access your [facility] medical records  [id number]        Equal Access to Services     [facility]: Hadii abdifatah joseph Sorocco, waaxda luqadaha, qaybta kaalmada brenda, gutierrez quiles . So [facility] [phone].    ATENCIÓN: Si habla español, tiene a ram disposición servicios gratuitos de asistencia lingüística. Llame al [phone].    We comply with applicable federal civil rights laws and " [place] laws. We do not discriminate on the basis of race, color, national origin, age, disability, sex, sexual orientation, or gender identity.            After Visit Summary       This is your record. Keep this with you and show to your community pharmacist(s) and doctor(s) at your next visit.

## 2018-01-20 NOTE — ED AVS SNAPSHOT
[facility]    [address] [place] [address]    Phone:  [phone]                                       Dilcia Santillan   MRN: [id number]    Department:  [facility]   Date of Visit:  1/20/2018           After Visit Summary Signature Page     I have received my discharge instructions, and my questions have been answered. I have discussed any challenges I see with this plan with the nurse or doctor.    ..........................................................................................................................................  Patient/Patient Representative Signature      ..........................................................................................................................................  Patient Representative Print Name and Relationship to Patient    ..................................................               ................................................  Date                                            Time    ..........................................................................................................................................  Reviewed by Signature/Title    ...................................................              ..............................................  Date                                                            Time

## 2018-01-21 NOTE — ED PROVIDER NOTES
History     Chief Complaint   Patient presents with     Headache     The history is provided by the patient. No  was used.     Dilcia Santillan is a 47 year old female with Hx migraine, who presents on day 4 migraine. Pain is typical migraine per patient, but started out with neck spasm/stiffness after sleeping in a chair. She reports nausea, but no vomiting, photophobia. No fever. No injury. No concern for influenza, cough, cold symptoms. No sinus pain or congestion.     Problem List:    There are no active problems to display for this patient.       Past Medical History:    Past Medical History:   Diagnosis Date     Migraines        Past Surgical History:    No past surgical history on file.    Family History:    No family history on file.    Social History:  Marital Status:   [2]  Social History   Substance Use Topics     Smoking status: Never Smoker     Smokeless tobacco: Never Used     Alcohol use No        Medications:      cyclobenzaprine (FLEXERIL) 10 MG tablet   FLUoxetine (PROZAC) 20 MG capsule   levonorgestrel-ethinyl estradiol (SEASONALE) 0.15-0.03 MG per tablet   traMADol (ULTRAM) 50 MG tablet   zolpidem (AMBIEN CR) 12.5 MG CR tablet   Topiramate (TOPAMAX PO)   Almotriptan Malate (AXERT PO)         Review of Systems   Constitutional: Negative.  Negative for chills and fever.   HENT: Negative for congestion, ear pain, sinus pressure and sore throat.    Eyes: Positive for photophobia.   Respiratory: Negative.  Negative for cough.    Cardiovascular: Negative.    Gastrointestinal: Positive for nausea. Negative for vomiting.   Skin: Negative.    Neurological: Positive for headaches.   Psychiatric/Behavioral: Negative.        Physical Exam   BP: 126/57  Pulse: 66  Temp: 97.1  F (36.2  C)  Resp: 16  SpO2: 98 %      Physical Exam   Constitutional: She is oriented to person, place, and time. She appears well-developed and well-nourished. She appears distressed (pt in darkened room,  cold pack to neck.).   HENT:   Head: Normocephalic and atraumatic.   Right Ear: External ear normal.   Left Ear: External ear normal.   Mouth/Throat: Oropharynx is clear and moist.   Eyes: Conjunctivae and EOM are normal. Pupils are equal, round, and reactive to light.   Neck: Normal range of motion. Muscular tenderness present. No spinous process tenderness present.       Cardiovascular: Normal rate.    Pulmonary/Chest: Effort normal.   Neurological: She is alert and oriented to person, place, and time. No cranial nerve deficit.   Skin: Skin is warm and dry.   Psychiatric: She has a normal mood and affect.   Nursing note and vitals reviewed.      ED Course     ED Course     Procedures    Medications   ketorolac (TORADOL) injection 60 mg (60 mg Intramuscular Given 1/20/18 1951)   0.9% sodium chloride BOLUS (0 mLs Intravenous Stopped 1/20/18 2111)   dexamethasone (DECADRON) injection 10 mg (10 mg Intravenous Given 1/20/18 2043)   diphenhydrAMINE (BENADRYL) injection 25 mg (25 mg Intravenous Given 1/20/18 2040)   prochlorperazine (COMPAZINE) injection 10 mg (10 mg Intravenous Given 1/20/18 2046)   Patient tolerated. Patient observed for 20 minutes.     Assessments & Plan (with Medical Decision Making)     I have reviewed the nursing notes.  I have reviewed the findings, diagnosis, plan and need for follow up with the patient.    Discharge Medication List as of 1/20/2018  9:13 PM      START taking these medications    Details   cyclobenzaprine (FLEXERIL) 10 MG tablet Take 0.5-1 tablets (5-10 mg) by mouth 3 times daily as needed for muscle spasms, Disp-30 tablet, R-1, E-Prescribe             Final diagnoses:   Cervicalgia   Intractable migraine without status migrainosus, unspecified migraine type   HA completely resolved, neck pain persists, but manageable. Pt will requesting return home. Will stretch as tolerated, flexeril for any spasm/strain. Follow up with Primary Care Provider in 3-7 days with poor improvement.  Seek attention sooner with worsening despite treatment. Patient verbally educated and given appropriate education sheets for each of the diagnoses and has no questions.    Figueroa Day PA-C   1/23/2018   11:34 AM    1/20/2018   [facility]     Figueroa Day PA  01/23/18 1134

## 2018-01-21 NOTE — DISCHARGE INSTRUCTIONS
- Rest, HEAT for the neck (bring blood flow to the area so spasm is not so bad)  - I ordered flexeril as there is a drug-drug interaction with zanaflex.   - stretch, acupuncture, chiropractor, physical therapy, massage therapy for neck.   - FYI Topicals for the neck: Arnica Cream (5$ at [facility]) and/or Capsaicin. (1/4 teaspoon cayenne pepper in a palmful olive oil and rub in 2-3 times daily for 5-7 days for pain)  * Back here with worst headache of life, fevers, return of headache unable to control at home.

## 2018-01-21 NOTE — ED NOTES
Pt here with her . She is complaining of migraine she has had since Thursday. Has hx of migraines. Complaining of light sensitivity and neck spasms. She is rating pain 10/10 at this time.

## 2018-12-03 ENCOUNTER — TRANSFERRED RECORDS (OUTPATIENT)
Dept: HEALTH INFORMATION MANAGEMENT | Facility: CLINIC | Age: 48
End: 2018-12-03

## 2018-12-31 DIAGNOSIS — R63.5 WEIGHT GAIN: Primary | ICD-10-CM

## 2019-03-07 ENCOUNTER — HOSPITAL ENCOUNTER (EMERGENCY)
Facility: HOSPITAL | Age: 49
Discharge: HOME | End: 2019-03-07
Attending: FAMILY MEDICINE | Admitting: FAMILY MEDICINE
Payer: COMMERCIAL

## 2019-03-07 VITALS
TEMPERATURE: 98.9 F | OXYGEN SATURATION: 98 % | WEIGHT: 170 LBS | BODY MASS INDEX: 26.63 KG/M2 | HEART RATE: 85 BPM | DIASTOLIC BLOOD PRESSURE: 77 MMHG | RESPIRATION RATE: 14 BRPM | SYSTOLIC BLOOD PRESSURE: 103 MMHG

## 2019-03-07 DIAGNOSIS — G43.109 MIGRAINE WITH AURA AND WITHOUT STATUS MIGRAINOSUS, NOT INTRACTABLE: ICD-10-CM

## 2019-03-07 PROCEDURE — 96361 HYDRATE IV INFUSION ADD-ON: CPT

## 2019-03-07 PROCEDURE — 96375 TX/PRO/DX INJ NEW DRUG ADDON: CPT

## 2019-03-07 PROCEDURE — 99284 EMERGENCY DEPT VISIT MOD MDM: CPT | Mod: 25

## 2019-03-07 PROCEDURE — 96374 THER/PROPH/DIAG INJ IV PUSH: CPT

## 2019-03-07 PROCEDURE — 25000128 H RX IP 250 OP 636: Performed by: FAMILY MEDICINE

## 2019-03-07 PROCEDURE — 99284 EMERGENCY DEPT VISIT MOD MDM: CPT | Mod: Z6 | Performed by: FAMILY MEDICINE

## 2019-03-07 RX ORDER — KETOROLAC TROMETHAMINE 30 MG/ML
30 INJECTION, SOLUTION INTRAMUSCULAR; INTRAVENOUS ONCE
Status: COMPLETED | OUTPATIENT
Start: 2019-03-07 | End: 2019-03-07

## 2019-03-07 RX ORDER — SODIUM CHLORIDE 9 MG/ML
1000 INJECTION, SOLUTION INTRAVENOUS CONTINUOUS
Status: DISCONTINUED | OUTPATIENT
Start: 2019-03-07 | End: 2019-03-07 | Stop reason: HOSPADM

## 2019-03-07 RX ORDER — DIPHENHYDRAMINE HYDROCHLORIDE 50 MG/ML
25 INJECTION INTRAMUSCULAR; INTRAVENOUS ONCE
Status: COMPLETED | OUTPATIENT
Start: 2019-03-07 | End: 2019-03-07

## 2019-03-07 RX ORDER — CHOLECALCIFEROL (VITAMIN D3) 50 MCG
2000 TABLET ORAL EVERY EVENING
COMMUNITY
Start: 2018-04-06

## 2019-03-07 RX ORDER — DEXAMETHASONE SODIUM PHOSPHATE 10 MG/ML
10 INJECTION INTRAMUSCULAR; INTRAVENOUS ONCE
Status: COMPLETED | OUTPATIENT
Start: 2019-03-07 | End: 2019-03-07

## 2019-03-07 RX ADMIN — SODIUM CHLORIDE 1000 ML: 9 INJECTION, SOLUTION INTRAVENOUS at 15:50

## 2019-03-07 RX ADMIN — DIPHENHYDRAMINE HYDROCHLORIDE 25 MG: 50 INJECTION, SOLUTION INTRAMUSCULAR; INTRAVENOUS at 15:51

## 2019-03-07 RX ADMIN — PROCHLORPERAZINE EDISYLATE 10 MG: 5 INJECTION INTRAMUSCULAR; INTRAVENOUS at 15:57

## 2019-03-07 RX ADMIN — DEXAMETHASONE SODIUM PHOSPHATE 10 MG: 10 INJECTION INTRAMUSCULAR; INTRAVENOUS at 16:00

## 2019-03-07 RX ADMIN — KETOROLAC TROMETHAMINE 30 MG: 30 INJECTION, SOLUTION INTRAMUSCULAR at 15:54

## 2019-03-07 ASSESSMENT — ENCOUNTER SYMPTOMS
FEVER: 0
LIGHT-HEADEDNESS: 0
HEADACHES: 1
NECK STIFFNESS: 0
NAUSEA: 1
PSYCHIATRIC NEGATIVE: 1
PHOTOPHOBIA: 1
SHORTNESS OF BREATH: 0
DYSURIA: 0
VOMITING: 0
BACK PAIN: 0
FATIGUE: 1
ACTIVITY CHANGE: 1
ABDOMINAL PAIN: 0
NECK PAIN: 0
WEAKNESS: 0
CONSTIPATION: 0
DIARRHEA: 0

## 2019-03-07 NOTE — ED NOTES
Patient being evaluated today for ongoing migraine headache. She has followed in the past with neurology, but currently follows with PCP. She has a daily routine that she follows, along with rescue medications. She has used all available medications at home without relief. She is instructed to come in for injections if her pain continues longer than 3 days. Patient denies any current nausea or vomiting. She is resting on ED cart. Call light in reach.

## 2019-03-07 NOTE — ED PROVIDER NOTES
"  History     Chief Complaint   Patient presents with     Headache     \"Migraine headache since Monday, home medications are not relieving headache.\"      HPI  Dilcia Santillan is a 48 year old female who began having a migraine on Monday morning, has taken her usual home medications including Axert and has not had any relief from her migraine.  It is a left-sided migraine, and in its usual location.  She has mild photophobia, has had nausea but is not currently nauseated.  She is on a regimen at home, took her Axert last this morning and it did not do anything that she can feel.  She has no other symptoms, no lateralizing deficits, no weakness, no blurred vision.    Allergies:  Allergies   Allergen Reactions     Morphine Sulfate Nausea       Problem List:    There are no active problems to display for this patient.       Past Medical History:    Past Medical History:   Diagnosis Date     Migraines        Past Surgical History:    No past surgical history on file.    Family History:    No family history on file.    Social History:  Marital Status:   [2]  Social History     Tobacco Use     Smoking status: Never Smoker     Smokeless tobacco: Never Used   Substance Use Topics     Alcohol use: No     Drug use: No        Medications:      Almotriptan Malate (AXERT PO)   FLUoxetine (PROZAC) 20 MG capsule   levonorgestrel-ethinyl estradiol (SEASONALE) 0.15-0.03 MG per tablet   Topiramate (TOPAMAX PO)   traMADol (ULTRAM) 50 MG tablet   vitamin D3 (CHOLECALCIFEROL) 2000 units tablet   zolpidem (AMBIEN CR) 12.5 MG CR tablet   cyclobenzaprine (FLEXERIL) 10 MG tablet         Review of Systems   Constitutional: Positive for activity change and fatigue. Negative for fever.   HENT: Negative for congestion.    Eyes: Positive for photophobia.   Respiratory: Negative for shortness of breath.    Cardiovascular: Negative for chest pain.   Gastrointestinal: Positive for nausea. Negative for abdominal pain, constipation, diarrhea " and vomiting.   Genitourinary: Negative for dysuria.   Musculoskeletal: Negative for back pain, neck pain and neck stiffness.   Neurological: Positive for headaches. Negative for weakness and light-headedness.   Psychiatric/Behavioral: Negative.        Physical Exam   BP: 128/81  Pulse: 97  Temp: 98.9  F (37.2  C)  Resp: 18  Weight: 77.1 kg (170 lb)  SpO2: 99 %      Physical Exam   Constitutional: She is oriented to person, place, and time. She appears well-developed and well-nourished. She appears distressed.   HENT:   Head: Normocephalic and atraumatic.   Neck: Normal range of motion. Neck supple.   Cardiovascular: Normal rate, regular rhythm and normal heart sounds.   No murmur heard.  Pulmonary/Chest: Effort normal and breath sounds normal. No respiratory distress.   Abdominal: Soft. Bowel sounds are normal. She exhibits no distension. There is no tenderness.   Musculoskeletal: Normal range of motion. She exhibits no edema.   Neurological: She is alert and oriented to person, place, and time. No cranial nerve deficit.   Skin: Skin is warm and dry. Capillary refill takes less than 2 seconds.   Psychiatric: She has a normal mood and affect.   Nursing note and vitals reviewed.      ED Course        Procedures    No results found for this or any previous visit (from the past 24 hour(s)).    Medications   0.9% sodium chloride BOLUS (0 mLs Intravenous Stopped 3/7/19 1641)     Followed by   sodium chloride 0.9% infusion (1,000 mLs Intravenous Not Given 3/7/19 1642)   diphenhydrAMINE (BENADRYL) injection 25 mg (25 mg Intravenous Given 3/7/19 1551)   ketorolac (TORADOL) injection 30 mg (30 mg Intravenous Given 3/7/19 1554)   dexamethasone (DECADRON) injection 10 mg (10 mg Intravenous Given 3/7/19 1600)   prochlorperazine (COMPAZINE) injection 10 mg (10 mg Intravenous Given 3/7/19 1557)       Assessments & Plan (with Medical Decision Making)   Patient given Compazine, Toradol, Benadryl, and Decadron.  After 1 hour the  patient's headache was completely gone, she has some residual soreness in her left eye which is typical for her migraines.  She is ready for discharge.  No new medications.  Follow-up with Dr. Navarrete on Monday if there are further issues.    I have reviewed the nursing notes.    I have reviewed the findings, diagnosis, plan and need for follow up with the patient.     Medication List      There are no discharge medications for this visit.         Final diagnoses:   Migraine with aura and without status migrainosus, not intractable       3/7/2019   [facility]     Brittanie Tobin MD  03/07/19 2433

## 2019-03-07 NOTE — ED AVS SNAPSHOT
[facility]  [address] [place] [id number]  Phone:  [phone]                                    Dilcia Santillan   MRN: [id number]    Department:  [facility]   Date of Visit:  3/7/2019           After Visit Summary Signature Page    I have received my discharge instructions, and my questions have been answered. I have discussed any challenges I see with this plan with the nurse or doctor.    ..........................................................................................................................................  Patient/Patient Representative Signature      ..........................................................................................................................................  Patient Representative Print Name and Relationship to Patient    ..................................................               ................................................  Date                                   Time    ..........................................................................................................................................  Reviewed by Signature/Title    ...................................................              ..............................................  Date                                               Time          22EPIC Rev 08/18

## 2019-10-30 ENCOUNTER — HOSPITAL ENCOUNTER (OUTPATIENT)
Dept: MRI IMAGING | Facility: HOSPITAL | Age: 49
Discharge: HOME | End: 2019-10-30
Attending: INTERNAL MEDICINE | Admitting: INTERNAL MEDICINE
Payer: COMMERCIAL

## 2019-10-30 DIAGNOSIS — M19.90 ARTHRITIS: ICD-10-CM

## 2019-10-30 DIAGNOSIS — M25.551 PAIN OF BOTH HIP JOINTS: ICD-10-CM

## 2019-10-30 DIAGNOSIS — M25.552 PAIN OF BOTH HIP JOINTS: ICD-10-CM

## 2019-10-30 DIAGNOSIS — M53.3 SACROILIAC PAIN: ICD-10-CM

## 2019-10-30 PROCEDURE — 72195 MRI PELVIS W/O DYE: CPT | Mod: TC

## 2020-03-28 ENCOUNTER — HOSPITAL ENCOUNTER (INPATIENT)
Facility: HOSPITAL | Age: 50
LOS: 5 days | Discharge: HOME | DRG: 871 | End: 2020-04-03
Attending: EMERGENCY MEDICINE | Admitting: INTERNAL MEDICINE
Payer: COMMERCIAL

## 2020-03-28 ENCOUNTER — APPOINTMENT (OUTPATIENT)
Dept: GENERAL RADIOLOGY | Facility: HOSPITAL | Age: 50
DRG: 871 | End: 2020-03-28
Attending: EMERGENCY MEDICINE
Payer: COMMERCIAL

## 2020-03-28 ENCOUNTER — APPOINTMENT (OUTPATIENT)
Dept: CT IMAGING | Facility: HOSPITAL | Age: 50
DRG: 871 | End: 2020-03-28
Attending: EMERGENCY MEDICINE
Payer: COMMERCIAL

## 2020-03-28 DIAGNOSIS — J18.9 PNEUMONIA OF RIGHT LOWER LOBE DUE TO INFECTIOUS ORGANISM: ICD-10-CM

## 2020-03-28 DIAGNOSIS — J18.9 COMMUNITY ACQUIRED PNEUMONIA, UNSPECIFIED LATERALITY: Primary | ICD-10-CM

## 2020-03-28 DIAGNOSIS — M79.7 FIBROMYALGIA: ICD-10-CM

## 2020-03-28 LAB
ALBUMIN SERPL-MCNC: 3.2 G/DL (ref 3.4–5)
ALBUMIN UR-MCNC: NEGATIVE MG/DL
ALP SERPL-CCNC: 153 U/L (ref 40–150)
ALT SERPL W P-5'-P-CCNC: 210 U/L (ref 0–50)
ANION GAP SERPL CALCULATED.3IONS-SCNC: 9 MMOL/L (ref 3–14)
APPEARANCE UR: CLEAR
AST SERPL W P-5'-P-CCNC: 90 U/L (ref 0–45)
BACTERIA #/AREA URNS HPF: ABNORMAL /HPF
BASOPHILS # BLD AUTO: 0.1 10E9/L (ref 0–0.2)
BASOPHILS NFR BLD AUTO: 0.4 %
BILIRUB SERPL-MCNC: 1.2 MG/DL (ref 0.2–1.3)
BILIRUB UR QL STRIP: NEGATIVE
BUN SERPL-MCNC: 8 MG/DL (ref 7–30)
CALCIUM SERPL-MCNC: 9.2 MG/DL (ref 8.5–10.1)
CHLORIDE SERPL-SCNC: 98 MMOL/L (ref 94–109)
CO2 SERPL-SCNC: 24 MMOL/L (ref 20–32)
COLOR UR AUTO: ABNORMAL
CREAT SERPL-MCNC: 0.78 MG/DL (ref 0.52–1.04)
CRP SERPL-MCNC: 190 MG/L (ref 0–8)
D DIMER PPP FEU-MCNC: 1.1 UG/ML FEU (ref 0–0.5)
DIFFERENTIAL METHOD BLD: ABNORMAL
EOSINOPHIL # BLD AUTO: 0 10E9/L (ref 0–0.7)
EOSINOPHIL NFR BLD AUTO: 0.2 %
ERYTHROCYTE [DISTWIDTH] IN BLOOD BY AUTOMATED COUNT: 11.9 % (ref 10–15)
FLUAV+FLUBV RNA SPEC QL NAA+PROBE: NEGATIVE
FLUAV+FLUBV RNA SPEC QL NAA+PROBE: NEGATIVE
GFR SERPL CREATININE-BSD FRML MDRD: 89 ML/MIN/{1.73_M2}
GLUCOSE SERPL-MCNC: 120 MG/DL (ref 70–99)
GLUCOSE UR STRIP-MCNC: NEGATIVE MG/DL
HCT VFR BLD AUTO: 42.5 % (ref 35–47)
HGB BLD-MCNC: 14.5 G/DL (ref 11.7–15.7)
HGB UR QL STRIP: ABNORMAL
IMM GRANULOCYTES # BLD: 0.2 10E9/L (ref 0–0.4)
IMM GRANULOCYTES NFR BLD: 1.3 %
KETONES UR STRIP-MCNC: NEGATIVE MG/DL
LACTATE BLD-SCNC: 3.5 MMOL/L (ref 0.7–2)
LDH SERPL L TO P-CCNC: 288 U/L (ref 81–234)
LEUKOCYTE ESTERASE UR QL STRIP: ABNORMAL
LYMPHOCYTES # BLD AUTO: 0.6 10E9/L (ref 0.8–5.3)
LYMPHOCYTES NFR BLD AUTO: 4.7 %
MCH RBC QN AUTO: 30.1 PG (ref 26.5–33)
MCHC RBC AUTO-ENTMCNC: 34.1 G/DL (ref 31.5–36.5)
MCV RBC AUTO: 88 FL (ref 78–100)
MONOCYTES # BLD AUTO: 1.7 10E9/L (ref 0–1.3)
MONOCYTES NFR BLD AUTO: 13.8 %
NEUTROPHILS # BLD AUTO: 9.6 10E9/L (ref 1.6–8.3)
NEUTROPHILS NFR BLD AUTO: 79.6 %
NITRATE UR QL: NEGATIVE
NRBC # BLD AUTO: 0 10*3/UL
NRBC BLD AUTO-RTO: 0 /100
PH UR STRIP: 6.5 PH (ref 4.7–8)
PLATELET # BLD AUTO: 177 10E9/L (ref 150–450)
POTASSIUM SERPL-SCNC: 3 MMOL/L (ref 3.4–5.3)
PROCALCITONIN SERPL-MCNC: 0.28 NG/ML
PROT SERPL-MCNC: 7.8 G/DL (ref 6.8–8.8)
RBC # BLD AUTO: 4.81 10E12/L (ref 3.8–5.2)
RBC #/AREA URNS AUTO: 5 /HPF (ref 0–2)
RSV RNA SPEC NAA+PROBE: NEGATIVE
SODIUM SERPL-SCNC: 131 MMOL/L (ref 133–144)
SOURCE: ABNORMAL
SP GR UR STRIP: 1 (ref 1–1.03)
SPECIMEN SOURCE: NORMAL
SQUAMOUS #/AREA URNS AUTO: 3 /HPF (ref 0–1)
UROBILINOGEN UR STRIP-MCNC: NORMAL MG/DL (ref 0–2)
WBC # BLD AUTO: 12 10E9/L (ref 4–11)
WBC #/AREA URNS AUTO: 15 /HPF (ref 0–5)

## 2020-03-28 PROCEDURE — 25000128 H RX IP 250 OP 636: Performed by: EMERGENCY MEDICINE

## 2020-03-28 PROCEDURE — 85379 FIBRIN DEGRADATION QUANT: CPT | Performed by: EMERGENCY MEDICINE

## 2020-03-28 PROCEDURE — 25000132 ZZH RX MED GY IP 250 OP 250 PS 637: Performed by: EMERGENCY MEDICINE

## 2020-03-28 PROCEDURE — 36415 COLL VENOUS BLD VENIPUNCTURE: CPT | Performed by: EMERGENCY MEDICINE

## 2020-03-28 PROCEDURE — 25800030 ZZH RX IP 258 OP 636: Performed by: EMERGENCY MEDICINE

## 2020-03-28 PROCEDURE — 87631 RESP VIRUS 3-5 TARGETS: CPT | Performed by: EMERGENCY MEDICINE

## 2020-03-28 PROCEDURE — 86140 C-REACTIVE PROTEIN: CPT | Performed by: EMERGENCY MEDICINE

## 2020-03-28 PROCEDURE — 96365 THER/PROPH/DIAG IV INF INIT: CPT

## 2020-03-28 PROCEDURE — 71275 CT ANGIOGRAPHY CHEST: CPT | Mod: TC

## 2020-03-28 PROCEDURE — 83605 ASSAY OF LACTIC ACID: CPT | Performed by: EMERGENCY MEDICINE

## 2020-03-28 PROCEDURE — 85025 COMPLETE CBC W/AUTO DIFF WBC: CPT | Performed by: EMERGENCY MEDICINE

## 2020-03-28 PROCEDURE — 99285 EMERGENCY DEPT VISIT HI MDM: CPT | Mod: Z6 | Performed by: EMERGENCY MEDICINE

## 2020-03-28 PROCEDURE — 84145 PROCALCITONIN (PCT): CPT | Performed by: EMERGENCY MEDICINE

## 2020-03-28 PROCEDURE — 99285 EMERGENCY DEPT VISIT HI MDM: CPT | Mod: 25

## 2020-03-28 PROCEDURE — 96361 HYDRATE IV INFUSION ADD-ON: CPT

## 2020-03-28 PROCEDURE — 81001 URINALYSIS AUTO W/SCOPE: CPT | Performed by: EMERGENCY MEDICINE

## 2020-03-28 PROCEDURE — 80053 COMPREHEN METABOLIC PANEL: CPT | Performed by: EMERGENCY MEDICINE

## 2020-03-28 PROCEDURE — 83615 LACTATE (LD) (LDH) ENZYME: CPT | Performed by: EMERGENCY MEDICINE

## 2020-03-28 PROCEDURE — 87086 URINE CULTURE/COLONY COUNT: CPT | Performed by: EMERGENCY MEDICINE

## 2020-03-28 PROCEDURE — 25500064 ZZH RX 255 OP 636: Performed by: EMERGENCY MEDICINE

## 2020-03-28 PROCEDURE — 71045 X-RAY EXAM CHEST 1 VIEW: CPT | Mod: TC

## 2020-03-28 PROCEDURE — 87040 BLOOD CULTURE FOR BACTERIA: CPT | Performed by: EMERGENCY MEDICINE

## 2020-03-28 RX ORDER — IBUPROFEN 600 MG/1
600 TABLET, FILM COATED ORAL ONCE
Status: COMPLETED | OUTPATIENT
Start: 2020-03-28 | End: 2020-03-28

## 2020-03-28 RX ORDER — SODIUM CHLORIDE 9 MG/ML
1000 INJECTION, SOLUTION INTRAVENOUS CONTINUOUS
Status: DISCONTINUED | OUTPATIENT
Start: 2020-03-28 | End: 2020-03-29

## 2020-03-28 RX ORDER — CEFTRIAXONE SODIUM 1 G/50ML
1 INJECTION, SOLUTION INTRAVENOUS ONCE
Status: COMPLETED | OUTPATIENT
Start: 2020-03-28 | End: 2020-03-28

## 2020-03-28 RX ORDER — POTASSIUM CHLORIDE 1500 MG/1
40 TABLET, EXTENDED RELEASE ORAL ONCE
Status: COMPLETED | OUTPATIENT
Start: 2020-03-28 | End: 2020-03-28

## 2020-03-28 RX ORDER — ACETAMINOPHEN 325 MG/1
650 TABLET ORAL ONCE
Status: COMPLETED | OUTPATIENT
Start: 2020-03-28 | End: 2020-03-28

## 2020-03-28 RX ADMIN — ACETAMINOPHEN 650 MG: 325 TABLET, FILM COATED ORAL at 21:02

## 2020-03-28 RX ADMIN — POTASSIUM CHLORIDE 40 MEQ: 1500 TABLET, EXTENDED RELEASE ORAL at 22:23

## 2020-03-28 RX ADMIN — SODIUM CHLORIDE 1000 ML: 9 INJECTION, SOLUTION INTRAVENOUS at 21:01

## 2020-03-28 RX ADMIN — IBUPROFEN 600 MG: 600 TABLET ORAL at 22:23

## 2020-03-28 RX ADMIN — SODIUM CHLORIDE 500 ML: 9 INJECTION, SOLUTION INTRAVENOUS at 22:33

## 2020-03-28 RX ADMIN — SODIUM CHLORIDE 500 ML: 9 INJECTION, SOLUTION INTRAVENOUS at 21:40

## 2020-03-28 RX ADMIN — CEFTRIAXONE SODIUM 1 G: 1 INJECTION, SOLUTION INTRAVENOUS at 22:27

## 2020-03-28 RX ADMIN — IOHEXOL 75 ML: 350 INJECTION, SOLUTION INTRAVENOUS at 23:34

## 2020-03-28 ASSESSMENT — ENCOUNTER SYMPTOMS
NECK STIFFNESS: 0
MYALGIAS: 0
PSYCHIATRIC NEGATIVE: 1
MUSCULOSKELETAL NEGATIVE: 1
HEMATOLOGIC/LYMPHATIC NEGATIVE: 1
COUGH: 1
CONSTITUTIONAL NEGATIVE: 1
ALLERGIC/IMMUNOLOGIC NEGATIVE: 1
ENDOCRINE NEGATIVE: 1
PHOTOPHOBIA: 0
NEUROLOGICAL NEGATIVE: 1
SHORTNESS OF BREATH: 0
NECK PAIN: 0
GASTROINTESTINAL NEGATIVE: 1
FEVER: 0
CHILLS: 0
EYES NEGATIVE: 1

## 2020-03-29 PROBLEM — J18.9 CAP (COMMUNITY ACQUIRED PNEUMONIA): Status: ACTIVE | Noted: 2020-03-29

## 2020-03-29 LAB
ALT SERPL W P-5'-P-CCNC: 166 U/L (ref 0–50)
ANION GAP SERPL CALCULATED.3IONS-SCNC: 8 MMOL/L (ref 3–14)
AST SERPL W P-5'-P-CCNC: 63 U/L (ref 0–45)
BUN SERPL-MCNC: 7 MG/DL (ref 7–30)
CALCIUM SERPL-MCNC: 8.7 MG/DL (ref 8.5–10.1)
CHLORIDE SERPL-SCNC: 108 MMOL/L (ref 94–109)
CO2 SERPL-SCNC: 22 MMOL/L (ref 20–32)
CREAT SERPL-MCNC: 0.74 MG/DL (ref 0.52–1.04)
ERYTHROCYTE [DISTWIDTH] IN BLOOD BY AUTOMATED COUNT: 12.1 % (ref 10–15)
GFR SERPL CREATININE-BSD FRML MDRD: >90 ML/MIN/{1.73_M2}
GLUCOSE SERPL-MCNC: 113 MG/DL (ref 70–99)
HCT VFR BLD AUTO: 42.8 % (ref 35–47)
HGB BLD-MCNC: 14.3 G/DL (ref 11.7–15.7)
LACTATE BLD-SCNC: 1.7 MMOL/L (ref 0.7–2)
MCH RBC QN AUTO: 30.6 PG (ref 26.5–33)
MCHC RBC AUTO-ENTMCNC: 33.4 G/DL (ref 31.5–36.5)
MCV RBC AUTO: 92 FL (ref 78–100)
PLATELET # BLD AUTO: 151 10E9/L (ref 150–450)
POTASSIUM SERPL-SCNC: 4.1 MMOL/L (ref 3.4–5.3)
RBC # BLD AUTO: 4.68 10E12/L (ref 3.8–5.2)
SODIUM SERPL-SCNC: 138 MMOL/L (ref 133–144)
WBC # BLD AUTO: 7.9 10E9/L (ref 4–11)

## 2020-03-29 PROCEDURE — 25800030 ZZH RX IP 258 OP 636: Performed by: INTERNAL MEDICINE

## 2020-03-29 PROCEDURE — 12000011 ZZH R&B MS OVERFLOW

## 2020-03-29 PROCEDURE — 25000125 ZZHC RX 250: Performed by: INTERNAL MEDICINE

## 2020-03-29 PROCEDURE — 25000128 H RX IP 250 OP 636: Performed by: INTERNAL MEDICINE

## 2020-03-29 PROCEDURE — 25000132 ZZH RX MED GY IP 250 OP 250 PS 637: Performed by: INTERNAL MEDICINE

## 2020-03-29 PROCEDURE — 83605 ASSAY OF LACTIC ACID: CPT | Performed by: INTERNAL MEDICINE

## 2020-03-29 PROCEDURE — 25000132 ZZH RX MED GY IP 250 OP 250 PS 637

## 2020-03-29 PROCEDURE — 84460 ALANINE AMINO (ALT) (SGPT): CPT | Performed by: INTERNAL MEDICINE

## 2020-03-29 PROCEDURE — 87635 SARS-COV-2 COVID-19 AMP PRB: CPT | Performed by: EMERGENCY MEDICINE

## 2020-03-29 PROCEDURE — 36415 COLL VENOUS BLD VENIPUNCTURE: CPT | Performed by: INTERNAL MEDICINE

## 2020-03-29 PROCEDURE — 80048 BASIC METABOLIC PNL TOTAL CA: CPT | Performed by: INTERNAL MEDICINE

## 2020-03-29 PROCEDURE — 84450 TRANSFERASE (AST) (SGOT): CPT | Performed by: INTERNAL MEDICINE

## 2020-03-29 PROCEDURE — 99223 1ST HOSP IP/OBS HIGH 75: CPT | Performed by: INTERNAL MEDICINE

## 2020-03-29 PROCEDURE — 85027 COMPLETE CBC AUTOMATED: CPT | Performed by: INTERNAL MEDICINE

## 2020-03-29 RX ORDER — ZOLPIDEM TARTRATE 10 MG/1
10 TABLET ORAL
Status: DISCONTINUED | OUTPATIENT
Start: 2020-03-29 | End: 2020-03-31

## 2020-03-29 RX ORDER — TRAMADOL HYDROCHLORIDE 50 MG/1
50 TABLET ORAL 3 TIMES DAILY PRN
Status: DISCONTINUED | OUTPATIENT
Start: 2020-03-29 | End: 2020-04-03 | Stop reason: HOSPADM

## 2020-03-29 RX ORDER — POTASSIUM CHLORIDE 750 MG/1
40 CAPSULE, EXTENDED RELEASE ORAL ONCE
Status: COMPLETED | OUTPATIENT
Start: 2020-03-29 | End: 2020-03-29

## 2020-03-29 RX ORDER — LIDOCAINE 40 MG/G
CREAM TOPICAL
Status: DISCONTINUED | OUTPATIENT
Start: 2020-03-29 | End: 2020-04-03 | Stop reason: HOSPADM

## 2020-03-29 RX ORDER — ALBUTEROL SULFATE 5 MG/ML
2.5 SOLUTION RESPIRATORY (INHALATION)
Status: DISCONTINUED | OUTPATIENT
Start: 2020-03-29 | End: 2020-03-29

## 2020-03-29 RX ORDER — ONDANSETRON 2 MG/ML
4 INJECTION INTRAMUSCULAR; INTRAVENOUS EVERY 6 HOURS PRN
Status: DISCONTINUED | OUTPATIENT
Start: 2020-03-29 | End: 2020-04-03 | Stop reason: HOSPADM

## 2020-03-29 RX ORDER — LACTOBACILLUS RHAMNOSUS GG 10B CELL
1 CAPSULE ORAL 2 TIMES DAILY
Status: DISCONTINUED | OUTPATIENT
Start: 2020-03-29 | End: 2020-04-03 | Stop reason: HOSPADM

## 2020-03-29 RX ORDER — SODIUM CHLORIDE, SODIUM LACTATE, POTASSIUM CHLORIDE, CALCIUM CHLORIDE 600; 310; 30; 20 MG/100ML; MG/100ML; MG/100ML; MG/100ML
INJECTION, SOLUTION INTRAVENOUS CONTINUOUS
Status: DISCONTINUED | OUTPATIENT
Start: 2020-03-29 | End: 2020-04-01

## 2020-03-29 RX ORDER — IBUPROFEN 200 MG
400 TABLET ORAL EVERY 6 HOURS PRN
Status: DISCONTINUED | OUTPATIENT
Start: 2020-03-29 | End: 2020-03-29

## 2020-03-29 RX ORDER — PROCHLORPERAZINE MALEATE 5 MG
10 TABLET ORAL EVERY 6 HOURS PRN
Status: DISCONTINUED | OUTPATIENT
Start: 2020-03-29 | End: 2020-04-03 | Stop reason: HOSPADM

## 2020-03-29 RX ORDER — ZOLPIDEM TARTRATE 12.5 MG/1
12.5 TABLET, FILM COATED, EXTENDED RELEASE ORAL
Status: DISCONTINUED | OUTPATIENT
Start: 2020-03-29 | End: 2020-03-31

## 2020-03-29 RX ORDER — PROCHLORPERAZINE 25 MG
25 SUPPOSITORY, RECTAL RECTAL EVERY 12 HOURS PRN
Status: DISCONTINUED | OUTPATIENT
Start: 2020-03-29 | End: 2020-04-03 | Stop reason: HOSPADM

## 2020-03-29 RX ORDER — TOPIRAMATE 25 MG/1
100 TABLET, FILM COATED ORAL 2 TIMES DAILY
Status: DISCONTINUED | OUTPATIENT
Start: 2020-03-29 | End: 2020-04-03 | Stop reason: HOSPADM

## 2020-03-29 RX ORDER — ZOLPIDEM TARTRATE 5 MG/1
TABLET ORAL
Status: COMPLETED
Start: 2020-03-29 | End: 2020-03-29

## 2020-03-29 RX ORDER — ACETAMINOPHEN 325 MG/1
650-975 TABLET ORAL EVERY 4 HOURS PRN
Status: DISCONTINUED | OUTPATIENT
Start: 2020-03-29 | End: 2020-03-29

## 2020-03-29 RX ORDER — LEVONORGESTREL AND ETHINYL ESTRADIOL 0.15-0.03
1 KIT ORAL DAILY
Status: DISCONTINUED | OUTPATIENT
Start: 2020-03-29 | End: 2020-03-29 | Stop reason: CLARIF

## 2020-03-29 RX ORDER — NALOXONE HYDROCHLORIDE 0.4 MG/ML
.1-.4 INJECTION, SOLUTION INTRAMUSCULAR; INTRAVENOUS; SUBCUTANEOUS
Status: DISCONTINUED | OUTPATIENT
Start: 2020-03-29 | End: 2020-04-03 | Stop reason: HOSPADM

## 2020-03-29 RX ORDER — BENZONATATE 100 MG/1
200 CAPSULE ORAL 3 TIMES DAILY PRN
Status: DISCONTINUED | OUTPATIENT
Start: 2020-03-29 | End: 2020-04-03 | Stop reason: HOSPADM

## 2020-03-29 RX ORDER — CEFTRIAXONE SODIUM 1 G/50ML
1 INJECTION, SOLUTION INTRAVENOUS EVERY 24 HOURS
Status: DISCONTINUED | OUTPATIENT
Start: 2020-03-29 | End: 2020-04-03 | Stop reason: HOSPADM

## 2020-03-29 RX ORDER — CALCIUM CARBONATE 500 MG/1
1000 TABLET, CHEWABLE ORAL 4 TIMES DAILY PRN
Status: DISCONTINUED | OUTPATIENT
Start: 2020-03-29 | End: 2020-04-03 | Stop reason: HOSPADM

## 2020-03-29 RX ORDER — ACETAMINOPHEN 325 MG/1
650-975 TABLET ORAL EVERY 4 HOURS PRN
Status: DISCONTINUED | OUTPATIENT
Start: 2020-03-29 | End: 2020-04-03 | Stop reason: HOSPADM

## 2020-03-29 RX ORDER — DOXYCYCLINE HYCLATE 100 MG
100 TABLET ORAL EVERY 12 HOURS SCHEDULED
Status: DISCONTINUED | OUTPATIENT
Start: 2020-03-29 | End: 2020-03-29

## 2020-03-29 RX ORDER — ALMOTRIPTAN 6.25 MG/1
6 TABLET, FILM COATED ORAL
Status: DISCONTINUED | OUTPATIENT
Start: 2020-03-29 | End: 2020-03-29 | Stop reason: CLARIF

## 2020-03-29 RX ORDER — GUAIFENESIN/DEXTROMETHORPHAN 100-10MG/5
10-15 SYRUP ORAL EVERY 4 HOURS PRN
Status: DISCONTINUED | OUTPATIENT
Start: 2020-03-29 | End: 2020-04-03 | Stop reason: HOSPADM

## 2020-03-29 RX ORDER — ALMOTRIPTAN 6.25 MG/1
6 TABLET, FILM COATED ORAL
Status: DISCONTINUED | OUTPATIENT
Start: 2020-03-29 | End: 2020-03-30

## 2020-03-29 RX ORDER — ACETAMINOPHEN 325 MG/1
650 TABLET ORAL EVERY 4 HOURS PRN
Status: DISCONTINUED | OUTPATIENT
Start: 2020-03-29 | End: 2020-03-29

## 2020-03-29 RX ORDER — ONDANSETRON 4 MG/1
4 TABLET, ORALLY DISINTEGRATING ORAL EVERY 6 HOURS PRN
Status: DISCONTINUED | OUTPATIENT
Start: 2020-03-29 | End: 2020-04-03 | Stop reason: HOSPADM

## 2020-03-29 RX ORDER — CYCLOBENZAPRINE HCL 5 MG
5-10 TABLET ORAL 3 TIMES DAILY PRN
Status: DISCONTINUED | OUTPATIENT
Start: 2020-03-29 | End: 2020-03-30

## 2020-03-29 RX ORDER — POTASSIUM CHLORIDE 1500 MG/1
TABLET, EXTENDED RELEASE ORAL
Status: DISCONTINUED
Start: 2020-03-29 | End: 2020-03-29 | Stop reason: HOSPADM

## 2020-03-29 RX ORDER — IPRATROPIUM BROMIDE AND ALBUTEROL SULFATE 2.5; .5 MG/3ML; MG/3ML
3 SOLUTION RESPIRATORY (INHALATION) 2 TIMES DAILY
Status: DISCONTINUED | OUTPATIENT
Start: 2020-03-29 | End: 2020-03-29

## 2020-03-29 RX ADMIN — ACETAMINOPHEN 975 MG: 325 TABLET, FILM COATED ORAL at 23:50

## 2020-03-29 RX ADMIN — SODIUM CHLORIDE 1000 ML: 9 INJECTION, SOLUTION INTRAVENOUS at 18:50

## 2020-03-29 RX ADMIN — SODIUM CHLORIDE 1000 ML: 9 INJECTION, SOLUTION INTRAVENOUS at 02:54

## 2020-03-29 RX ADMIN — DOXYCYCLINE 100 MG: 100 INJECTION, POWDER, LYOPHILIZED, FOR SOLUTION INTRAVENOUS at 10:52

## 2020-03-29 RX ADMIN — TOPIRAMATE 100 MG: 100 TABLET, FILM COATED ORAL at 21:17

## 2020-03-29 RX ADMIN — POTASSIUM CHLORIDE 40 MEQ: 750 CAPSULE, EXTENDED RELEASE ORAL at 02:58

## 2020-03-29 RX ADMIN — ACETAMINOPHEN 650 MG: 325 TABLET, FILM COATED ORAL at 10:09

## 2020-03-29 RX ADMIN — Medication 1 CAPSULE: at 21:17

## 2020-03-29 RX ADMIN — DOXYCYCLINE HYCLATE 100 MG: 100 TABLET, COATED ORAL at 02:48

## 2020-03-29 RX ADMIN — ACETAMINOPHEN 975 MG: 325 TABLET, FILM COATED ORAL at 19:52

## 2020-03-29 RX ADMIN — SODIUM CHLORIDE, POTASSIUM CHLORIDE, SODIUM LACTATE AND CALCIUM CHLORIDE: 600; 310; 30; 20 INJECTION, SOLUTION INTRAVENOUS at 08:28

## 2020-03-29 RX ADMIN — Medication 1 CAPSULE: at 10:10

## 2020-03-29 RX ADMIN — ACETAMINOPHEN 650 MG: 325 TABLET, FILM COATED ORAL at 15:35

## 2020-03-29 RX ADMIN — GUAIFENESIN AND DEXTROMETHORPHAN 10 ML: 100; 10 SYRUP ORAL at 16:25

## 2020-03-29 RX ADMIN — TOPIRAMATE 100 MG: 100 TABLET, FILM COATED ORAL at 10:10

## 2020-03-29 RX ADMIN — GUAIFENESIN AND DEXTROMETHORPHAN 10 ML: 100; 10 SYRUP ORAL at 10:09

## 2020-03-29 RX ADMIN — CEFTRIAXONE SODIUM 1 G: 1 INJECTION, SOLUTION INTRAVENOUS at 22:24

## 2020-03-29 RX ADMIN — ZOLPIDEM TARTRATE 2.5 MG: 5 TABLET, FILM COATED ORAL at 05:46

## 2020-03-29 RX ADMIN — CYCLOBENZAPRINE 10 MG: 5 TABLET, FILM COATED ORAL at 19:55

## 2020-03-29 RX ADMIN — ENOXAPARIN SODIUM 40 MG: 40 INJECTION SUBCUTANEOUS at 11:51

## 2020-03-29 RX ADMIN — IBUPROFEN 400 MG: 200 TABLET, FILM COATED ORAL at 08:28

## 2020-03-29 RX ADMIN — TRAMADOL HYDROCHLORIDE 50 MG: 50 TABLET ORAL at 02:48

## 2020-03-29 RX ADMIN — ZOLPIDEM TARTRATE 10 MG: 5 TABLET, FILM COATED ORAL at 05:49

## 2020-03-29 RX ADMIN — SODIUM CHLORIDE 1000 ML: 9 INJECTION, SOLUTION INTRAVENOUS at 21:30

## 2020-03-29 ASSESSMENT — ACTIVITIES OF DAILY LIVING (ADL)
TRANSFERRING: 0-->INDEPENDENT
FALL_HISTORY_WITHIN_LAST_SIX_MONTHS: NO
ADLS_ACUITY_SCORE: 12
COGNITION: 0 - NO COGNITION ISSUES REPORTED
BATHING: 0-->INDEPENDENT
ADLS_ACUITY_SCORE: 12
ADLS_ACUITY_SCORE: 13
TOILETING: 0-->INDEPENDENT
SWALLOWING: 0-->SWALLOWS FOODS/LIQUIDS WITHOUT DIFFICULTY
DRESS: 0-->INDEPENDENT
RETIRED_COMMUNICATION: 0-->UNDERSTANDS/COMMUNICATES WITHOUT DIFFICULTY
ADLS_ACUITY_SCORE: 13
RETIRED_EATING: 0-->INDEPENDENT
AMBULATION: 0-->INDEPENDENT
ADLS_ACUITY_SCORE: 13

## 2020-03-29 ASSESSMENT — MIFFLIN-ST. JEOR: SCORE: 1475.63

## 2020-03-29 NOTE — ED NOTES
Checked with lab regarding lactic acid results. Checking to see if they received order. Lactic acid was drawn on admission.

## 2020-03-29 NOTE — PROGRESS NOTES
Medical record and Kip Score reviewed. Participated in interdisciplinary rounds.  No apparent needs noted at this time. Care Transitions will remain available if needs arise.

## 2020-03-29 NOTE — PROVIDER NOTIFICATION
"Dr. Amin notified of HR tachy in the 130's apically- new orders rec'd for Continuous pulse ox and continuous cardiac monitoring. VS as charted, T 100.4 and 7/10 generalized body \"hurts\" per pt report. Ibuprofen given per MAR and cool washcloth applied to forehead, pt resting in bed with monitors on and alarms activated.   "

## 2020-03-29 NOTE — PLAN OF CARE
[facility] Inpatient Admission Note:    Patient admitted to 3312/3312-01 at approximately 0148 via wheel chair accompanied by transport tech from emergency room . Report received from TITA Healy in SBAR format at 0130 via telephone. Patient transferred to bed via self.. Patient is alert and oriented X 3, reports pain; rates at 6 on 0-10 scale.  Patient oriented to room, unit, hourly rounding, and plan of care. Explained admission packet and patient handbook with patient bill of rights brochure. Will continue to monitor and document as needed.     Inpatient Nursing criteria listed below was met:    Health care directives status obtained and documented: Yes    Care Everywhere authorization obtained Yes    MRSA swab completed for patient 65 years and older: N/A    Patient identifies a surrogate decision maker: Yes If yes, who:Zafar () Contact Information: [phone]     If initial lactic acid >2.0, repeat lactic acid drawn within one hour of arrival to unit: NO. If no, state reason: MD scheduled lab for am.     Vaccination assessment and education completed: Yes   Vaccinations received prior to admission: Pneumovax yes  Influenza(seasonal)  NO   Vaccination(s) ordered: patient declines    Clergy visit ordered if patient requests: No    Skin issues/needs documented: Yes    Isolation Patient: yes Education given, correct sign in place and documentation row added to PCS:  Yes    Fall Prevention No: Care plan updated, education given and documented, sticker and magnet in place: No    Care Plan initiated: Yes    Education Documented (including assessment): Yes    Patient has discharge needs : No If yes, please explain:

## 2020-03-29 NOTE — ED NOTES
DATE:  3/28/2020   TIME OF RECEIPT FROM LAB:  0638  LAB TEST:  Lactic acid  LAB VALUE:  3.5  RESULTS GIVEN WITH READ-BACK TO (PROVIDER):  Dr. Colon  TIME LAB VALUE REPORTED TO PROVIDER:   4055

## 2020-03-29 NOTE — ED PROVIDER NOTES
History     Chief Complaint   Patient presents with     Cough     fever and body aches. states in bed since Tuesday.     HPI  Dilcia Santillan is a 49 year old female who presents today with complaints of a cough fever body aches since Tuesday.  Symptoms began gradually.  Patient denies any ill contacts or recent travel outside the country.  Patient has been in usual state of health.  Patient feels that she feels very weak at this current time.  Denies any photophobia or neck stiffness.  No additional complaints.    Allergies:  Allergies   Allergen Reactions     Morphine Sulfate Nausea     Nortriptyline      Weight gain         Problem List:    There are no active problems to display for this patient.       Past Medical History:    Past Medical History:   Diagnosis Date     Migraines        Past Surgical History:    No past surgical history on file.    Family History:    No family history on file.    Social History:  Marital Status:   [2]  Social History     Tobacco Use     Smoking status: Never Smoker     Smokeless tobacco: Never Used   Substance Use Topics     Alcohol use: No     Drug use: No        Medications:    Almotriptan Malate (AXERT PO)  cyclobenzaprine (FLEXERIL) 10 MG tablet  Topiramate (TOPAMAX PO)  traMADol (ULTRAM) 50 MG tablet  vitamin D3 (CHOLECALCIFEROL) 2000 units tablet  FLUoxetine (PROZAC) 20 MG capsule  levonorgestrel-ethinyl estradiol (SEASONALE) 0.15-0.03 MG per tablet  zolpidem (AMBIEN CR) 12.5 MG CR tablet          Review of Systems   Constitutional: Negative.  Negative for chills and fever.   HENT: Positive for congestion.    Eyes: Negative.  Negative for photophobia.   Respiratory: Positive for cough. Negative for shortness of breath.    Cardiovascular: Negative for chest pain.   Gastrointestinal: Negative.    Endocrine: Negative.    Genitourinary: Negative.    Musculoskeletal: Negative.  Negative for myalgias, neck pain and neck stiffness.   Skin: Negative.     Allergic/Immunologic: Negative.    Neurological: Negative.    Hematological: Negative.    Psychiatric/Behavioral: Negative.        Physical Exam   BP: (!) 146/114  Pulse: (!) 142  Temp: 100.6  F (38.1  C)  Resp: 24  SpO2: 95 %      Physical Exam  Constitutional:       General: She is not in acute distress.     Appearance: She is normal weight. She is not toxic-appearing.   HENT:      Head: Normocephalic and atraumatic.      Mouth/Throat:      Pharynx: No oropharyngeal exudate or posterior oropharyngeal erythema.   Eyes:      Conjunctiva/sclera: Conjunctivae normal.   Neck:      Musculoskeletal: Normal range of motion and neck supple. No neck rigidity.   Cardiovascular:      Rate and Rhythm: Regular rhythm. Tachycardia present.      Pulses: Normal pulses.      Heart sounds: Normal heart sounds.   Pulmonary:      Effort: Pulmonary effort is normal.      Breath sounds: Normal breath sounds.   Abdominal:      General: Abdomen is flat. There is no distension.      Tenderness: There is no abdominal tenderness. There is no right CVA tenderness, left CVA tenderness or guarding.   Musculoskeletal: Normal range of motion.   Lymphadenopathy:      Cervical: No cervical adenopathy.   Skin:     General: Skin is warm.      Capillary Refill: Capillary refill takes less than 2 seconds.   Neurological:      General: No focal deficit present.      Mental Status: She is alert.      Cranial Nerves: No cranial nerve deficit.      Sensory: No sensory deficit.      Motor: No weakness.      Coordination: Coordination normal.      Gait: Gait normal.      Deep Tendon Reflexes: Reflexes normal.   Psychiatric:         Mood and Affect: Mood normal.         Behavior: Behavior normal.         Thought Content: Thought content normal.         Judgment: Judgment normal.         ED Course     ED Course as of Mar 29 0103   Sun Mar 29, 2020   0054 Patient feels slightly better. CT findings noted. Dr. Garcia contacted and agree to admit.          Procedures          CXR - Right basal opacification consitent with infection    CT of chest - Dense right lower lobe pneumonia and pneumonitis with right pleural effusion       Results for orders placed or performed during the hospital encounter of 03/28/20 (from the past 24 hour(s))   Influenza A and B and RSV PCR   Result Value Ref [place]    Specimen Description Nasopharyngeal     Influenza A PCR Negative NEG^Negative    Influenza B PCR Negative NEG^Negative    Resp Syncytial Virus Negative NEG^Negative   Comprehensive metabolic panel   Result Value Ref [place]    Sodium 131 (L) 133 - 144 mmol/L    Potassium 3.0 (L) 3.4 - 5.3 mmol/L    Chloride 98 94 - 109 mmol/L    Carbon Dioxide 24 20 - 32 mmol/L    Anion Gap 9 3 - 14 mmol/L    Glucose 120 (H) 70 - 99 mg/dL    Urea Nitrogen 8 7 - 30 mg/dL    Creatinine 0.78 0.52 - 1.04 mg/dL    GFR Estimate 89 >60 mL/min/[1.73_m2]    GFR Estimate If Black >90 >60 mL/min/[1.73_m2]    Calcium 9.2 8.5 - 10.1 mg/dL    Bilirubin Total 1.2 0.2 - 1.3 mg/dL    Albumin 3.2 (L) 3.4 - 5.0 g/dL    Protein Total 7.8 6.8 - 8.8 g/dL    Alkaline Phosphatase 153 (H) 40 - 150 U/L     (H) 0 - 50 U/L    AST 90 (H) 0 - 45 U/L   CBC with platelets differential   Result Value Ref [place]    WBC 12.0 (H) 4.0 - 11.0 10e9/L    RBC Count 4.81 3.8 - 5.2 10e12/L    Hemoglobin 14.5 11.7 - 15.7 g/dL    Hematocrit 42.5 35.0 - 47.0 %    MCV 88 78 - 100 fl    MCH 30.1 26.5 - 33.0 pg    MCHC 34.1 31.5 - 36.5 g/dL    RDW 11.9 10.0 - 15.0 %    Platelet Count 177 150 - 450 10e9/L    Diff Method Automated Method     % Neutrophils 79.6 %    % Lymphocytes 4.7 %    % Monocytes 13.8 %    % Eosinophils 0.2 %    % Basophils 0.4 %    % Immature Granulocytes 1.3 %    Nucleated RBCs 0 0 /100    Absolute Neutrophil 9.6 (H) 1.6 - 8.3 10e9/L    Absolute Lymphocytes 0.6 (L) 0.8 - 5.3 10e9/L    Absolute Monocytes 1.7 (H) 0.0 - 1.3 10e9/L    Absolute Eosinophils 0.0 0.0 - 0.7 10e9/L    Absolute Basophils 0.1 0.0 - 0.2 10e9/L     Abs Immature Granulocytes 0.2 0 - 0.4 10e9/L    Absolute Nucleated RBC 0.0    Lactate for Sepsis Protocol   Result Value Ref [place]    Lactate for Sepsis Protocol 3.5 (H) 0.7 - 2.0 mmol/L   D dimer quantitative   Result Value Ref [place]    D Dimer 1.1 (H) 0.0 - 0.50 ug/ml FEU   CRP inflammation   Result Value Ref [place]    CRP Inflammation 190.0 (H) 0.0 - 8.0 mg/L   Procalcitonin   Result Value Ref [place]    Procalcitonin 0.28 ng/ml   Lactate Dehydrogenase   Result Value Ref [place]    Lactate Dehydrogenase 288 (H) 81 - 234 U/L   UA with Microscopic   Result Value Ref [place]    Color Urine Light Yellow     Appearance Urine Clear     Glucose Urine Negative NEG^Negative mg/dL    Bilirubin Urine Negative NEG^Negative    Ketones Urine Negative NEG^Negative mg/dL    Specific Gravity Urine 1.004 1.003 - 1.035    Blood Urine Trace (A) NEG^Negative    pH Urine 6.5 4.7 - 8.0 pH    Protein Albumin Urine Negative NEG^Negative mg/dL    Urobilinogen mg/dL Normal 0.0 - 2.0 mg/dL    Nitrite Urine Negative NEG^Negative    Leukocyte Esterase Urine Moderate (A) NEG^Negative    Source Midstream Urine     WBC Urine 15 (H) 0 - 5 /HPF    RBC Urine 5 (H) 0 - 2 /HPF    Bacteria Urine None (A) NEG^Negative /HPF    Squamous Epithelial /HPF Urine 3 (H) 0 - 1 /HPF       Medications   sodium chloride (PF) 0.9% PF flush 3 mL (has no administration in time range)   sodium chloride (PF) 0.9% PF flush 3 mL (has no administration in time range)   0.9% sodium chloride BOLUS (has no administration in time range)   acetaminophen (TYLENOL) tablet 650 mg (has no administration in time range)       Assessments & Plan (with Medical Decision Making)     49-year-old female who presents today with complaints of cough, fever and shortness of breath.  Symptoms for over the past 2 days.  Patient denies any ill contacts.  Patient denies any recent travel outside the country.  Labs as above and significant for elevated d-dimer, slightly elevated white blood cell  count, elevated liver function tests and low potassium. Influenza tests negative. UA showed 15 WBCs but no bacteria. Patient denied any UTI sx.     Patient treated supportively in the emergency department with IV fluids and antipyretics.  Chest x-ray showed a right lower lobe infiltrate confirmed on CT as an right lower lobe pneumonia, pneumonitis with possible pleural effusion. No evidence of PE.    Patient overall felt better but was still very weak and tired.  Concerned that patient may be septic she had blood cx done earlier and received a gram of Rocephin.  Patient is going to be admitted for continued IV antibiotics and will be placed under investigation for possible coronavirus. Dr. Garcia currently at bedside examining patient.    Due to the nature of this electronic medical record, laboratory results, imaging results, diagnosis, other information and medications reported above may not represent information available to me at the the time of my care and disposition. Medications reported above may have not been ordered by me.     Portions of the record may have been created with voice recognition software. Occasional wrong-word or 'sound-a- like' substitution may have occurred due to the inherent limitations of voice recognition software. Though the chart has been reviewed, there may be inadvertent transcription errors. Read the chart carefully and recognize, using context, where substitutions have occurred.       New Prescriptions    No medications on file       Final diagnoses:   Pneumonia of right lower lobe due to infectious organism (H)       3/28/2020   [facility]     Philippe Colon MD  03/31/20 2120

## 2020-03-29 NOTE — PLAN OF CARE
Pt sound asleep in bed, VS as charted. T 101.5 tympanically and rec'd tylenol per MAR. RR 20 and appears in no respiratory distress, 02 sats 97% on RA. Rec'd Guaifenesin for dry frequent non productive cough. Pt with cool hands and feet now- radial pulses and CMS intact and WNL, pedal pulses weak- Dr. Amin notified.  Remains on continuous cardiac and pulse oximetry monitoring and alarms activated.

## 2020-03-29 NOTE — PLAN OF CARE
"Reason for hospital stay:  RLL Pneumonia, COVID R/O  Living situation PTA: Home with  and children  Most recent vitals: /68   Pulse 102   Temp 98  F (36.7  C) (Temporal)   Resp 18   Ht 1.702 m (5' 7\")   Wt 81.3 kg (179 lb 3.7 oz)   SpO2 96%   BMI 28.07 kg/m       0500: /65   Pulse 102   Temp 99  F (37.2  C) (Temporal)   Resp 22   Ht 1.702 m (5' 7\")   Wt 81.3 kg (179 lb 3.7 oz)   SpO2 98%   BMI 28.07 kg/m  '       Pain Management:  Patient received tramadol x 1 for 6/10 generalized body aches. Also reports some chest pain, bilaterally with coughing.     LOC:  A&O x 4, pleasant and cooperative.     Cardiac: Apical Regular. HR 90s on admission. Cool LLE and RLE. Some tingling present, normal per patient. Cap refill less than 3 seconds.     Respiratory:  BARRIENTOS. Has dry, frequent, non-productive cough. Clear in RUL, AKIRA, LLL. Coarse in RML and RLL. O2 is 96% on room air.     GI:  BS active x 4, soft/nontender. Denies n/v. Reports decreased appetite since about Tuesday.     :  Voiding spontaneously. Shelbi colored. Denies urinary symptoms.     Skin Issues:  Skin pale but intact. No open areas present. Declines open area on coccyx, bottom. Unable to assess, patient declined. Dry lips and mucous membranes. Reports decrease in oral intake since Tuesday.     IVF:  NS @ 125 ml/h.   ABX:  Doxycyline, Rocephin.    Nutrition: Regular  ADL's:  Independent  Ambulation:Indepedent.   Safety:  Call light in reach, education on falls.       Comments: Received K+ 49 meq at 0300. Potassium is 4.1 at 0503 labs.       3/29/2020  3:43 AM  Hailee Vernon RN    "

## 2020-03-29 NOTE — DISCHARGE INSTRUCTIONS
What to expect when you have contrast    During your exam, we will inject  contrast  into your vein or artery. (Contrast is a clear liquid with iodine in it. It shows up on X-rays.)    You may feel warm or hot. You may have a metal taste in your mouth and a slight upset stomach. You may also feel pressure near the kidneys and bladder. These effects will last about 1 to 3 minutes.    Please tell us if you have:    Sneezing     Itching    Hives     Swelling in the face    A hoarse voice    Breathing problems    Other new symptoms    Serious problems are rare.  They may include:    Irregular heartbeat     Seizures    Kidney failure              Tissue damage    Shock      Death    If you have any problems during the exam, we  will treat them right away.    When you get home    Call your hospital if you have any new symptoms in the next 2 days, like hives or swelling. (Phone numbers are at the bottom of this page.) Or call your family doctor.     If you have wheezing or trouble breathing, call 911.    Self-care  -Drink at least 4 extra glasses of water today.   This reduces the stress on your kidneys.  -Keep taking your regular medicines.    The contrast will pass out of your body in your  Urine(pee). This will happen in the next 24 hours. You  will not feel this. Your urine will not  change color.    If you have kidney problems or take metformin    Drink 4 to 8 large glasses of water for the next  2 days, if you are not on a fluid restriction.    ?If you take metformin (Glucophage or Glucovance) for diabetes, keep taking it.      ?Your kidney function tests are abnormal.  If you take Metformin, do not take it for 48 hours. Please go to your clinic for a blood test within 3 days after your exam before the restarting this medicine.     (Note to provider:please give patient prescription for lab tests.)    ?Special instructions: Drink an extra 4 glasses of water to flush out the contrast.     I have read and understand  the above information.    Patient Sign Here:______________________________________Date:________Time:______    Staff Sign Here:________________________________________Date:_______Time:______      Radiology Departments:     ?[facility]: [phone] ?[place]: [phone]     ?[place]: [phone] ?[facility]:[phone]      ?[place]: [phone]  ?[place]: [phone]  ?[place]:[phone]    ?[facility] [place]:[phone]  ?[facility]:[phone]    Follow-up appointment:  4/6/20 (Monday) at 2:00 PM with Dr. Navarrete  [place]

## 2020-03-29 NOTE — PROGRESS NOTES
Markedly improved.  More awake and interactive.  Heart tones distinct and regular.  Borderline tachycardia but improved.  Still with some intermittent borderline tachypnea.  Pulmonary exam shows improved aeration.  Rhonchi, egophony, dullness still to right lower lobe.  Extremities warm.  Brisk capillary refill.  Minimally diminished but easily palpable peripheral pulses.  Mild oliguria but adequate urine output.  Avoiding excessive fluid to minimize increase in extravascular lung water.

## 2020-03-29 NOTE — PHARMACY - DISCHARGE MEDICATION RECONCILIATION
[facility]    Pharmacy      Antimicrobial Stewardship Note     Current antimicrobial therapy:  Anti-infectives (From now, onward)    Start     Dose/Rate Route Frequency Ordered Stop    03/29/20 2200  cefTRIAXone in d5w (ROCEPHIN) intermittent infusion 1 g      1 g  over 30 Minutes Intravenous EVERY 24 HOURS 03/29/20 0206      03/29/20 0215  doxycycline hyclate (VIBRA-TABS) tablet 100 mg      100 mg Oral EVERY 12 HOURS SCHEDULED 03/29/20 0206            Indication: CAP    Days of Therapy: 2     Pertinent labs:  Creatinine   Creatinine   Date Value Ref Range Status   03/29/2020 0.74 0.52 - 1.04 mg/dL Final   03/28/2020 0.78 0.52 - 1.04 mg/dL Final   10/26/2017 0.78 0.52 - 1.04 mg/dL Final     WBC   WBC   Date Value Ref Range Status   03/29/2020 7.9 4.0 - 11.0 10e9/L Final   03/28/2020 12.0 (H) 4.0 - 11.0 10e9/L Final   10/26/2017 4.4 4.0 - 11.0 10e9/L Final     Procalcitonin   Procalcitonin   Date Value Ref Range Status   03/28/2020 0.28 ng/ml Final     Comment:     0.25-0.49 ng/ml  Possible early systemic infection or localized infection.     Recommendation: Encourage antibiotics only in the correct clinical context.   Consider obtaining blood cultures or other relevant cultures. Recheck PCT in   6-12 hours to ensure baseline low level. If repeat PCT is rising, consider   early systemic infection and consider starting antibiotics.       CRP   CRP Inflammation   Date Value Ref Range Status   03/28/2020 190.0 (H) 0.0 - 8.0 mg/L Final       Culture Results:   (3/28/20) Urine  (3/28/20) Blood  (3/28/20) Influenza/RSV = negative  (3/28/20) COVID-19     Recommendations/Interventions:  1. None    Sundeep Rosario, [facility]  March 29, 2020

## 2020-03-29 NOTE — PROGRESS NOTES
"[facility]    Hospitalist Progress Note    Date of Service (when I saw the patient): 03/29/2020    Assessment & Plan   Dilcia Santillan is a 49 year old woman who was admitted on 3/28/2020.  She is relatively sparse medical history with medically managed migraine headaches.   For the past 4-6 days she has had minimally productive cough with brown to clear sputum, myalgias, and fevers.  Fevers have been relatively persistent.  She is concerned about her symptoms and isolated herself at home.  She is been able to take oral fluid although has been anorexic and has minimal food intake.  Fevers have symptomatically responded to acetaminophen.  She generally felt increasingly unwell and fatigued leading her to present to emergency department where fever of 102  F and mild tachypnea was noted.  Neutrophilia with WBC of 12.  Elevated lactic acid which responded to initial treatment in emergency department.  She had a mild transaminitis.  In addition potassium was mildly decreased.  Chest radiograph, confirmed by CT in \"angiogram\" protocol confirmed a left lower lobe airspace infiltrate with air bronchograms.  She was admitted for further evaluation and management.  COVID PCR obtained.    1.  Acute respiratory failure  While she has preserved oxygen saturation and no evidence of hypercapnia on serum chemistries she remains dyspneic with borderline tachypnea.  These meet criteria for respiratory failure.  Certainly at risk of deterioration and continued close monitoring is warranted.  2.  Lower respiratory tract infection  Focal infiltrate is most suggestive of bacterial pneumonia and agree with treatment directed at community organisms.  Several features, however, suggests  novel coronavirus infection.  Relatively low procalcitonin.  She has had persistent fevers over several days.  Overall clinical severity of illness is greater than laboratory or imaging findings.  Continuing usual isolation and COVID " "management is reasonable.  Will change doxycycline to intravenous source as oral absorption of some question.  Continue ceftriaxone.  Blood cultures appropriately obtained in emergency department.  3.  Sepsis  Meets most sensitive criteria.  Perhaps a borderline metabolic encephalopathy suggesting endorgan dysfunction.  She does have persistent tachycardia although I suspect this is largely on the basis of mild volume depletion because of large insensible loss.  Persistently febrile.  4.  Metabolic encephalopathy  While she is easily aroused and alert she is mildly somnolent.  Responses minimally slowed.  Quite consistent with encephalopathy based on acute illness and in fact is somewhat more suggestive of a viral than not bacterial infection.  No focal findings.  5.  Volume depletion  Borderline volume depletion.  Likely resulting from poor oral intake and insensible loss.  Intravenous crystalloid.  Change to lactated Ringer's to minimize chloride load.  6.  Transaminitis  Could consider \"atypical\" bacterial infection such as Legionella.  Urinary antigen is limited in sensitivity and specificity and well I would be unlikely to change treatment.  Doxycycline reasonable initial choice from the perspective of atypical organisms.  Intravenous route as above.  She has had some decline in her transaminitis over her initial period of hospitalization.  Once again this may be observed in viral infection.  No elevation of bilirubin.  No thrombocytopenia.  Seasonally tickborne infection quite unlikely.  7.  Hyperglycemia of acute illness  Mild hyperglycemia.  Quite consistent with acute illness.  Does not require active treatment unless glucose consistently over 140.    While not requiring life support intervention, this patient meets most sensitive criteria for critical illness.  I been present at bedside on multiple occasions for directing ongoing therapy as well as multiple serial evaluations.  She requires ICU management " for close nursing and especially respiratory therapy monitoring and intervention.    Active Problems:    CAP (community acquired pneumonia)       DVT Prophylaxis: Enoxaparin subcutaneously code Status: Full Code    Disposition: Expected discharge difficult to determine.  At least 24 hours of ICU management.    Aly Juarez    Interval History   Easily aroused.  Responses minimally slowed.  Speech clear.  Minimal dyspnea.  Persistent cough.  Anorexic.    -Data reviewed today: I reviewed all new labs and imaging results over the last 24 hours. I personally reviewed chest radiograph as well as CT of chest.  Findings as above.  EKG shows a sinus tachycardia.  No T ST wave abnormality.    Peripheral IV 03/28/20 Left (Active)   Site Assessment WDL 03/29/20 0818   Line Status Infusing;Checked every 1-2 hour 03/29/20 0818   Phlebitis Scale 0-->no symptoms 03/29/20 0818   Infiltration Scale 0 03/28/20 2100   Extravasation? No 03/28/20 2100   Number of days: 1     Line/device assessment(s) completed for medical necessity March 29    Physical Exam   Temp: (!) 101.5  F (38.6  C) Temp src: Tympanic BP: 124/73 Pulse: (!) 132 Heart Rate: (!) 125 Resp: 20 SpO2: 96 % O2 Device: None (Room air)    Vitals:    03/29/20 0223   Weight: 81.3 kg (179 lb 3.7 oz)     Vital Signs with Ranges  Temp:  [97.9  F (36.6  C)-102.4  F (39.1  C)] 101.5  F (38.6  C)  Pulse:  [102-142] 132  Heart Rate:  [] 125  Resp:  [9-24] 20  BP: ()/() 124/73  SpO2:  [95 %-99 %] 96 %  I/O last 3 completed shifts:  In: 4711 [P.O.:120; I.V.:4591]  Out: -     Somnolent easily arousable woman lying in bed on COVID ICU.  Speech is clear.  Minimally slowed responses.    HEENT: Pupils equal, conjugate. No icterus or nystagmus. Oral mucosa moist. No facial asymmetry.   Neck: Supple, jugular veins flat. Trachea midline   Chest: No chest wall movement asymmetry. Aeration preserved to bases to the left.  Decreased aeration to right.  Dullness to  percussion with borderline egophony at right base. Accessory inspiratory muscles slightly activated use. Expiratory time not increased. No tidal wheezes.  Scattered medium right basilar rhonchi. No discrete crackles.   Cardiac: PMI not displaced. S1, S2 unremarkable. No S3, S4. P2 not accentuated.  1/6 midsystolic murmur without radiation to carotids. Carotid upstroke preserved. Carotid amplitude preserved.   Abdomen: Soft. No palpation or percussion tenderness. No distention. Normoactive bowel sounds. Liver and spleen not increased in size. No bruits, masses, or pulsations.   Extremities: No lower extremity edema.  Minimally cool distally.  Brisk capillary refill.  No eccymoses, clubbing.   Neurologic: Mental state above. Motor 5/5 and bilaterally equal. Tone preserved. No fasiculations or tremors.  No asterixis.  Sensation intact to light touch. DTR 1-2/4 and bilaterally equal.     Medications     lactated ringers 125 mL/hr at 03/29/20 0828       cefTRIAXone  1 g Intravenous Q24H     doxycycline  intermittent infusion  100 mg Intravenous BID     enoxaparin ANTICOAGULANT  40 mg Subcutaneous Daily     lactobacillus rhamnosus (GG)  1 capsule Oral BID     levonorgestrel-ethinyl estradiol  1 tablet Oral Daily     potassium chloride ER         sodium chloride (PF)  3 mL Intravenous Q8H     topiramate  100 mg Oral BID           Data   Recent Labs   Lab 03/29/20  0506 03/29/20  0503 03/28/20  2055   WBC  --  7.9 12.0*   HGB  --  14.3 14.5   MCV  --  92 88   PLT  --  151 177   NA  --  138 131*   POTASSIUM  --  4.1 3.0*   CHLORIDE  --  108 98   CO2  --  22 24   BUN  --  7 8   CR  --  0.74 0.78   ANIONGAP  --  8 9   JUAN  --  8.7 9.2   GLC  --  113* 120*   ALBUMIN  --   --  3.2*   PROTTOTAL  --   --  7.8   BILITOTAL  --   --  1.2   ALKPHOS  --   --  153*   *  --  210*   AST 63*  --  90*       Lactic Acid   Date Value Ref Range Status   03/29/2020 1.7 0.7 - 2.0 mmol/L Final       No results found for this or any  previous visit (from the past 24 hour(s)).

## 2020-03-29 NOTE — ED NOTES
Pt states started with fatigue, body aches, and cough that is both dry/harsh and productive brownish secretions at times since Tuesday. Started with fever/chills on Wednesday. Thinks she starts to feel better at night and then in the am feels terrible. Feels her sx's are increasing. Pt states she did not get a flu vac. This year.

## 2020-03-29 NOTE — PLAN OF CARE
Patient 02 sats drop into upper 80s during sleep - placed on NC 02 at 2 L) without much improvement - suspect bad 02 sensor connection  Switched 02 monitor to ear instead of forehead and this is better reading - at this time room air 96%   Informed provider of temps of 101.5 not responding well to tylenol and  vitals - provider ordered 1000 ML bolus NS

## 2020-03-29 NOTE — PLAN OF CARE
Patient requesting her home medication of Ambien. Educated on time as it was about 0515. Patient continues to request medication as she has not slept since she has been here. Notified MD Garcia.     MD Garcia says Okay to give.     House Supervisor cabinet override as it was unavailable in omni cell. PRN Ambien 12.5 mg given at 0546. Medication did not link with PRN order. Second nurse, Brigitte HONEYCUTT, witnessed. Will not show up under PRN s given but does show up under Override Pulls.

## 2020-03-29 NOTE — PLAN OF CARE
Face to face report given with opportunity to observe patient.    Report given to TITA Ramsey.     Hailee Vernon RN   3/29/2020  7:37 AM

## 2020-03-29 NOTE — H&P
[facility]    History and Physical - Hospitalist Service       Date of Admission:  3/28/2020    Assessment & Plan   Dilcia Santillan is a 49 year old female admitted on 3/28/2020.      RLL CAP: empiric antibiotics, RT treatments, supportive cares    Associated sepsis (Temp 102 + tachycardia ): trend lactic acid, IV fluids and CAP treatments    COVID screen    UTI with hematuria (+leukocyte esterase + WBC + RBC): urine culture and Rocephin CAP to provide empiric coverage.    Hypokalemia: replete with PO potassium     Diet: regular  DVT Prophylaxis: Pneumatic Compression Devices  Dotson Catheter: not present  Code Status: Full    Disposition Plan   Expected discharge: 2 - 3 days, recommended to prior living arrangement once COVID screen resulted, antibiotic plan established.  Entered: Joss Garcia DO 03/29/2020, 1:16 AM     The patient's care was discussed with the Patient.    Joss Garcia DO  [facility]    ______________________________________________________________________    Chief Complaint   Fever, chills, weakness, worsening cough    History is obtained from the patient    History of Present Illness   Dilcia Santillan is a 49 year old female who  Has medically managed migraine headaches and who is generally healthy.    Four days ago, as she worked from home with her family as usual, she started with the symptoms detailed. She isolated herself as much as possible from family, took Tylenol, but the symptoms progressed and she was feeling more ill and fatigued.    ER Course: vitals recorded a temperature of 102 and tachycardia (); otherwise normal vitals; she was in no particular distress, but looked tired and ill. Lab diagnostics resulted WBC 12.0 with left shift, elevated lactic acid (3.5) and CRP (190). Elevated LFT's (.0 and AST 90). Potassium was low (3.0) and stage 2 renal function was preserved. UA was consistent with UTI and influenza testing was negative.  Chest film and CT chest showed RLL consolidation. She was given IV fluids and Rocephin    Review of Systems       Constitutional:  Fever (100 at home) and chills, developing generalized weakness, no unintentional weight change, diminished appetite  Ears, Nose & Throat: no sore throat, no nasal drainage, no congestion. No ear pain, no ear drainage, no particular change in hearing  Eyes: no particular change in vision, no redness, no drainage  Cardiovascular: No chest pain at rest, no chest pain with familiar activities.  Pulmonary: worsening productive cough (thin brown sputum), increased work of breathing, more shortness of breath   Gastrointestinal: No abdominal pain, no nausea, no vomiting, no diarrhea. No particular change in bowel movement pattern, no black stools, no bloody stools  Genitourinary: No particular change in incontinence, no pain with urination, no particular change in stream, no particular change in amount urinated with urge, no discharge  Skin: No particular change in bruising, no rashes  Musculoskeletal: no particular change in strength, no particular change in muscle development  Neurological: no numbness and tingling, no headache, no particular change in balance, no dizziness  Psychologic: No particular change in depression and/or anxiety  Endocrine: No particular change in heat or cold intolerance          Past Medical History    I have reviewed this patient's medical history and updated it with pertinent information if needed.   Past Medical History:   Diagnosis Date     Migraines     headaches 3-4 times yearly beginning in 2008, prior to that daily       Past Surgical History   I have reviewed this patient's surgical history and updated it with pertinent information if needed.  No past surgical history on file.    Social History   I have reviewed this patient's social history and updated it with pertinent information if needed.  Social History     Tobacco Use     Smoking status: Never Smoker  "    Smokeless tobacco: Never Used   Substance Use Topics     Alcohol use: No     Drug use: No       Family History   I have reviewed this patient's family history and updated it with pertinent information if needed.   No family history on file.      Prior to Admission Medications   Prior to Admission Medications   Prescriptions Last Dose Informant Patient Reported? Taking?   Almotriptan Malate (AXERT PO)  Self Yes Yes   Sig: Take 6 mg by mouth at onset of headache    FLUoxetine (PROZAC) 20 MG capsule  Self Yes No   Sig: TAKE 1 CAPSULE BY MOUTH DAILY   Topiramate (TOPAMAX PO) 3/28/2020 at Unknown time Self Yes Yes   Sig: Take 100 mg by mouth 2 times daily.   cyclobenzaprine (FLEXERIL) 10 MG tablet  Self No Yes   Sig: Take 0.5-1 tablets (5-10 mg) by mouth 3 times daily as needed for muscle spasms   levonorgestrel-ethinyl estradiol (SEASONALE) 0.15-0.03 MG per tablet  Self Yes No   Sig: Take 1 tablet by mouth daily    traMADol (ULTRAM) 50 MG tablet  Self Yes Yes   Sig: Take three per day as needed for pain.   vitamin D3 (CHOLECALCIFEROL) 2000 units tablet 3/28/2020 at Unknown time Self Yes Yes   Sig: Take 2,000 Units by mouth daily   zolpidem (AMBIEN CR) 12.5 MG CR tablet  Self Yes No   Sig: Take 12.5 mg by mouth nightly as needed       Facility-Administered Medications: None     Allergies   Allergies   Allergen Reactions     Morphine Sulfate Nausea     Nortriptyline      Weight gain         Physical Exam   Vital Signs: Temp: (!) 102.4  F (39.1  C) Temp src: Oral BP: 116/74 Pulse: 116 Heart Rate: 120 Resp: 13 SpO2: 98 % O2 Device: None (Room air)    Weight: 0 lbs 0 oz    Case discussed with ER Provider, EHR reviewed; patient seen in ER room 1    Vital signs:  Temp: (!) 102.4  F (39.1  C) Temp src: Oral BP: 116/74 Pulse: 116 Heart Rate: 120 Resp: 13 SpO2: 98 % O2 Device: None (Room air)        Estimated body mass index is 26.63 kg/m  as calculated from the following:    Height as of 10/26/17: 1.702 m (5' 7\").    Weight " as of 3/7/19: 77.1 kg (170 lb).      General: No distress, interactive, looks tired  Head: normocephalic, no obvious trauma  Eyes: Gaze directed normally, sclera clear, no discharge, no abnormal ocular movements  Ears: Normal appearing age-related external ears, no discharge, stable hearing acuity loss  Nose: Normal age-related appearance  Mouth: Normal appearing oral mucosa, Gums and throat appear age-related normal  Neck: Normal age-related appearance, age-related range of motion, supple, no adenopathy  Pulmonary: Normal work of breathing, no expiratory delay, no coarseness, no wheezing  Cardiovascular: Distant heart sounds, regular rhythm   Abdomen: No obvious distention, soft, bowel sounds present with normal frequency and pitch  Rectal: Deferred  Back: Age-related normal  Skin: Age-related normal, no rashes  Extremities: Not tender, no lower extremity edema. Moving upper and lower extremities  Neurological: Grossly in tact  Psychiatric: Mood is stable, appropriately interactive        Data   Data reviewed today: I reviewed all medications, new labs and imaging results over the last 24 hours.

## 2020-03-29 NOTE — PLAN OF CARE
Pt lethargic upon initial assessment, oriented x3. HR tachy in the 130's, T 100.4 temporal and pt placed on continuous cardiac monitoring. RR 20 and sating 98% on RA. Lungs diminished t/o with R side coarseness, has a frequent non productive cough. MD notified and new orders rec'd. (see previous notes). Temp recheck after ibuprofen 101.5 tympanic and received tylenol with relief and pt asleep. Recheck Temp 100.1 and 98.9, HR one-teens per ICU RN. VS and assessment as charted Pt able to get up to the bathroom with SBA, had a light breakfast of oatmeal and pudding and tolerated without any N/V. IV infusing LR @ 125/hr, Doxy and Rocephin IV for abx coverage. Denies pain, free from falls/ injury and call light within reach.

## 2020-03-29 NOTE — ED NOTES
Awaiting for blood cx draw and then will give Motrin, antibiotics and potassium as ordered all at once.

## 2020-03-29 NOTE — ED NOTES
Reported temp of 102.4 oral, potassium level of 3.0, negative flu swab and liver enzyme elevation. No orders at this time.

## 2020-03-30 ENCOUNTER — APPOINTMENT (OUTPATIENT)
Dept: GENERAL RADIOLOGY | Facility: HOSPITAL | Age: 50
DRG: 871 | End: 2020-03-30
Attending: INTERNAL MEDICINE
Payer: COMMERCIAL

## 2020-03-30 LAB
ALBUMIN SERPL-MCNC: 2.3 G/DL (ref 3.4–5)
ALP SERPL-CCNC: 122 U/L (ref 40–150)
ALT SERPL W P-5'-P-CCNC: 114 U/L (ref 0–50)
ANION GAP SERPL CALCULATED.3IONS-SCNC: 9 MMOL/L (ref 3–14)
AST SERPL W P-5'-P-CCNC: 59 U/L (ref 0–45)
BACTERIA SPEC CULT: NORMAL
BASOPHILS # BLD AUTO: 0 10E9/L (ref 0–0.2)
BASOPHILS NFR BLD AUTO: 0 %
BILIRUB SERPL-MCNC: 0.7 MG/DL (ref 0.2–1.3)
BUN SERPL-MCNC: 4 MG/DL (ref 7–30)
CALCIUM SERPL-MCNC: 8 MG/DL (ref 8.5–10.1)
CHLORIDE SERPL-SCNC: 108 MMOL/L (ref 94–109)
CO2 SERPL-SCNC: 20 MMOL/L (ref 20–32)
CREAT SERPL-MCNC: 0.68 MG/DL (ref 0.52–1.04)
DIFFERENTIAL METHOD BLD: ABNORMAL
EOSINOPHIL # BLD AUTO: 0 10E9/L (ref 0–0.7)
EOSINOPHIL NFR BLD AUTO: 0 %
ERYTHROCYTE [DISTWIDTH] IN BLOOD BY AUTOMATED COUNT: 12.2 % (ref 10–15)
GFR SERPL CREATININE-BSD FRML MDRD: >90 ML/MIN/{1.73_M2}
GLUCOSE SERPL-MCNC: 104 MG/DL (ref 70–99)
HCT VFR BLD AUTO: 35.6 % (ref 35–47)
HGB BLD-MCNC: 11.8 G/DL (ref 11.7–15.7)
LACTATE BLD-SCNC: 1.6 MMOL/L (ref 0.7–2)
LYMPHOCYTES # BLD AUTO: 0.4 10E9/L (ref 0.8–5.3)
LYMPHOCYTES NFR BLD AUTO: 9 %
MCH RBC QN AUTO: 30.1 PG (ref 26.5–33)
MCHC RBC AUTO-ENTMCNC: 33.1 G/DL (ref 31.5–36.5)
MCV RBC AUTO: 91 FL (ref 78–100)
MONOCYTES # BLD AUTO: 0.5 10E9/L (ref 0–1.3)
MONOCYTES NFR BLD AUTO: 11 %
NEUTROPHILS # BLD AUTO: 3.6 10E9/L (ref 1.6–8.3)
NEUTROPHILS NFR BLD AUTO: 80 %
PLATELET # BLD AUTO: 137 10E9/L (ref 150–450)
PLATELET # BLD EST: ABNORMAL 10*3/UL
POTASSIUM SERPL-SCNC: 3.5 MMOL/L (ref 3.4–5.3)
PROCALCITONIN SERPL-MCNC: 0.21 NG/ML
PROT SERPL-MCNC: 5.7 G/DL (ref 6.8–8.8)
RBC # BLD AUTO: 3.92 10E12/L (ref 3.8–5.2)
RBC MORPH BLD: NORMAL
SARS-COV-2 RNA SPEC QL NAA+PROBE: NOT DETECTED
SODIUM SERPL-SCNC: 137 MMOL/L (ref 133–144)
SPECIMEN SOURCE: NORMAL
SPECIMEN SOURCE: NORMAL
WBC # BLD AUTO: 4.5 10E9/L (ref 4–11)

## 2020-03-30 PROCEDURE — 25000125 ZZHC RX 250: Performed by: INTERNAL MEDICINE

## 2020-03-30 PROCEDURE — 84145 PROCALCITONIN (PCT): CPT | Performed by: INTERNAL MEDICINE

## 2020-03-30 PROCEDURE — 25800030 ZZH RX IP 258 OP 636: Performed by: INTERNAL MEDICINE

## 2020-03-30 PROCEDURE — 25000132 ZZH RX MED GY IP 250 OP 250 PS 637: Performed by: INTERNAL MEDICINE

## 2020-03-30 PROCEDURE — 25000128 H RX IP 250 OP 636: Performed by: INTERNAL MEDICINE

## 2020-03-30 PROCEDURE — 80053 COMPREHEN METABOLIC PANEL: CPT | Performed by: INTERNAL MEDICINE

## 2020-03-30 PROCEDURE — 85025 COMPLETE CBC W/AUTO DIFF WBC: CPT | Performed by: INTERNAL MEDICINE

## 2020-03-30 PROCEDURE — 83605 ASSAY OF LACTIC ACID: CPT | Performed by: INTERNAL MEDICINE

## 2020-03-30 PROCEDURE — 36415 COLL VENOUS BLD VENIPUNCTURE: CPT | Performed by: INTERNAL MEDICINE

## 2020-03-30 PROCEDURE — 99233 SBSQ HOSP IP/OBS HIGH 50: CPT | Performed by: INTERNAL MEDICINE

## 2020-03-30 PROCEDURE — 71045 X-RAY EXAM CHEST 1 VIEW: CPT | Mod: TC

## 2020-03-30 PROCEDURE — 20000003 ZZH R&B ICU

## 2020-03-30 RX ORDER — IBUPROFEN 200 MG
800 TABLET ORAL EVERY 8 HOURS PRN
Status: DISCONTINUED | OUTPATIENT
Start: 2020-03-30 | End: 2020-04-03 | Stop reason: HOSPADM

## 2020-03-30 RX ORDER — CYCLOBENZAPRINE HCL 10 MG
10 TABLET ORAL 3 TIMES DAILY PRN
Status: ON HOLD | COMMUNITY
End: 2020-04-03

## 2020-03-30 RX ORDER — IBUPROFEN 200 MG
600 TABLET ORAL EVERY 8 HOURS PRN
Status: DISCONTINUED | OUTPATIENT
Start: 2020-03-30 | End: 2020-03-30 | Stop reason: DRUGHIGH

## 2020-03-30 RX ORDER — DULOXETIN HYDROCHLORIDE 30 MG/1
30 CAPSULE, DELAYED RELEASE ORAL EVERY EVENING
COMMUNITY

## 2020-03-30 RX ORDER — CYCLOBENZAPRINE HCL 10 MG
10 TABLET ORAL 3 TIMES DAILY PRN
Status: DISCONTINUED | OUTPATIENT
Start: 2020-03-30 | End: 2020-04-03 | Stop reason: HOSPADM

## 2020-03-30 RX ORDER — DULOXETIN HYDROCHLORIDE 30 MG/1
30 CAPSULE, DELAYED RELEASE ORAL DAILY
Status: DISCONTINUED | OUTPATIENT
Start: 2020-03-30 | End: 2020-04-03 | Stop reason: HOSPADM

## 2020-03-30 RX ADMIN — ACETAMINOPHEN 975 MG: 325 TABLET, FILM COATED ORAL at 16:58

## 2020-03-30 RX ADMIN — SODIUM CHLORIDE 1000 ML: 9 INJECTION, SOLUTION INTRAVENOUS at 18:28

## 2020-03-30 RX ADMIN — CEFTRIAXONE SODIUM 1 G: 1 INJECTION, SOLUTION INTRAVENOUS at 21:10

## 2020-03-30 RX ADMIN — DOXYCYCLINE 100 MG: 100 INJECTION, POWDER, LYOPHILIZED, FOR SOLUTION INTRAVENOUS at 10:52

## 2020-03-30 RX ADMIN — ENOXAPARIN SODIUM 40 MG: 40 INJECTION SUBCUTANEOUS at 11:04

## 2020-03-30 RX ADMIN — Medication 1 CAPSULE: at 21:11

## 2020-03-30 RX ADMIN — DOXYCYCLINE 100 MG: 100 INJECTION, POWDER, LYOPHILIZED, FOR SOLUTION INTRAVENOUS at 21:11

## 2020-03-30 RX ADMIN — SODIUM CHLORIDE, POTASSIUM CHLORIDE, SODIUM LACTATE AND CALCIUM CHLORIDE: 600; 310; 30; 20 INJECTION, SOLUTION INTRAVENOUS at 15:17

## 2020-03-30 RX ADMIN — SODIUM CHLORIDE, POTASSIUM CHLORIDE, SODIUM LACTATE AND CALCIUM CHLORIDE: 600; 310; 30; 20 INJECTION, SOLUTION INTRAVENOUS at 07:14

## 2020-03-30 RX ADMIN — ACETAMINOPHEN 975 MG: 325 TABLET, FILM COATED ORAL at 04:42

## 2020-03-30 RX ADMIN — IBUPROFEN 800 MG: 200 TABLET, FILM COATED ORAL at 18:26

## 2020-03-30 RX ADMIN — IBUPROFEN 800 MG: 200 TABLET, FILM COATED ORAL at 11:24

## 2020-03-30 RX ADMIN — DOXYCYCLINE 100 MG: 100 INJECTION, POWDER, LYOPHILIZED, FOR SOLUTION INTRAVENOUS at 00:00

## 2020-03-30 RX ADMIN — TOPIRAMATE 100 MG: 100 TABLET, FILM COATED ORAL at 21:11

## 2020-03-30 RX ADMIN — SODIUM CHLORIDE 1000 ML: 9 INJECTION, SOLUTION INTRAVENOUS at 05:08

## 2020-03-30 RX ADMIN — TRAMADOL HYDROCHLORIDE 50 MG: 50 TABLET ORAL at 01:20

## 2020-03-30 RX ADMIN — CYCLOBENZAPRINE 10 MG: 5 TABLET, FILM COATED ORAL at 21:15

## 2020-03-30 RX ADMIN — TOPIRAMATE 100 MG: 100 TABLET, FILM COATED ORAL at 11:24

## 2020-03-30 RX ADMIN — GUAIFENESIN AND DEXTROMETHORPHAN 15 ML: 100; 10 SYRUP ORAL at 13:17

## 2020-03-30 RX ADMIN — GUAIFENESIN AND DEXTROMETHORPHAN 10 ML: 100; 10 SYRUP ORAL at 21:15

## 2020-03-30 ASSESSMENT — ACTIVITIES OF DAILY LIVING (ADL)
ADLS_ACUITY_SCORE: 13

## 2020-03-30 NOTE — PLAN OF CARE
"Slept most of shift besides walking to bathroom. Continues to have high fevers. -130s sinus tach. Alternating tylenol and ibuprofen. C/o back pain. Refused tramadol. Also c/o being \"cold\" many times this shift. A&O x4, although at times is forgetful and lethargic. Alarms needed. Currently active. Drinking water appropriately. Adequate UO. Ate 10% of dinner, tolerated 6 popcicles this shift. Remains on room air, sats currently 98%. Lungs coarse, right worse than left, tachypneic labored at rest. NS bolus currently infusing. Ice packs and cool wash cloth to forehead remain in place. Resting with eyes closed. Attempted to call spouse 3 times but line was busy.     Face to face report given with opportunity to observe patient.    Report given to TITA Rodriguez RN   3/30/2020  7:01 PM   "

## 2020-03-30 NOTE — PLAN OF CARE
Text paged Dr Garcia at 1805 for temp of 40.3. , . Medicated with 975mg tylenol at 1658 for temp of 38.8. Cold rag applied to forehead. Ice packs in place. Awaiting any new orders for MD.

## 2020-03-30 NOTE — PLAN OF CARE
"Reason for hospital stay:  right lobe pneumonia - Covid PUI  Living situation PTA: home  Most recent vitals: /65   Pulse 117   Temp (!) 102.9  F (39.4  C) (Tympanic)   Resp (!) 28   Ht 1.702 m (5' 7\")   Wt 81.3 kg (179 lb 3.7 oz)   SpO2 93%   BMI 28.07 kg/m      Pain Management:  flexeril effective for back pain - hx of two back surgeries   LOC:  oriented x4 - lethargic flat affect   Cardiac:  tachy 110s-130s - weak pedal pulses   Respiratory:  tachy 28/min - room air maintaining sats upper 90s - most recent assess right side coarse crackles throughout and L) clear upper and dim lower - intermittent cough congested dry nonproductive, cough prn syrup effective for frequent cough relief   GI:  ABD soft nondistended nontender - BS active x4  :  voids spontaneously adequate output - drinking water well and often   Skin Issues:  pale - extremities cool to tough with warm core - cap refill <3 sec     IVF:  bolus x2 of 1000 mL NS - 125 mL/hr LR    Nutrition: regular diet tolerated well   ADL's:  some assistance due to fatigue   Ambulation:stand by help with cords - steady gait     Temp high of 103.2 - given tylenol q4h    Face to face report given with opportunity to observe patient.    Report given to jayro Ruiz   3/29/2020  11:34 PM      "

## 2020-03-30 NOTE — PROGRESS NOTES
"[facility]    Hospitalist Progress Note      Assessment & Plan   Dilcia Santillan is a 49 year old woman who was admitted on 3/28/2020.  She has a relatively sparse medical history with medically managed migraine headaches.   For the past 4-6 days she has had minimally productive cough with brown to clear sputum, myalgias, and fevers.  Fevers have been relatively persistent.  She was concerned about her symptoms and isolated herself at home.  She has been able to take oral fluid although has been anorexic and has minimal food intake.  Fevers have symptomatically responded to acetaminophen.  She generally felt increasingly unwell and fatigued leading her to present to emergency department where fever of 102  F and mild tachypnea was noted.  Neutrophilia with WBC of 12.  Elevated lactic acid which responded to initial treatment in emergency department.  She had a mild transaminitis.  In addition potassium was mildly decreased.  Chest radiograph, confirmed by CT in \"angiogram\" protocol confirmed a left lower lobe airspace infiltrate with air bronchograms.  She was admitted for further evaluation and management.  COVID PCR obtained.     1.  Acute respiratory failure  O2 sats continue to be stable.  Sats did dip into 80s during night and was placed on 1 L by nasal cannula, but has weaned off again with sats up 90s to 100% on room air. She had no evidence of hypercapnia on serum chemistries, yet she remains dyspneic with borderline tachypnea. Continues to be at risk of deterioration and continued close monitoring is warranted.  2.  Lower respiratory tract infection  Focal infiltrate is most suggestive of bacterial pneumonia and agree with treatment directed at community organisms.  Several features, however, suggest novel coronavirus infection.  Relatively low procalcitonin.  She has had persistent fevers over several days, now with fever up to 104.5 today. Ideally, would like to avoid NSAIDs as much as able " "per current recommendations while ruling out COVID. Yet given her high fevers and maximized 24 hour dose of tylenol, will add some ibuprofen PRN.  Continue with use of supplementary treatments such as cold packs, removing blankets. Continuing usual isolation and COVID management is reasonable.  Continue with IV Ceftriaxone and doxycycline.  Blood cultures appropriately obtained in emergency department with no growth to date.  Seems to be remaining stable from respiratory standpoint and will continue current cares. If fevers persist over next 24-48 hours or if respiratory status worsening, consider repeat chest imaging.   3.  Sepsis  Meets most sensitive criteria.  Perhaps a borderline metabolic encephalopathy suggesting end organ dysfunction.  She does have persistent tachycardia although I suspect this is largely on the basis of mild volume depletion because of large insensible loss with persistent fevers. Did get 1 liter fluid bolus overnight, but will also try to remain somewhat cautious with fluids so as minimize extravascular lung volume.   4.  Metabolic encephalopathy  While she is easily aroused and alert she is mildly somnolent.  Responses minimally slowed.  Quite consistent with encephalopathy based on acute illness and in fact is somewhat more suggestive of a viral rather than bacterial infection.  No focal findings.  5.  Volume depletion  Borderline volume depletion.  Likely resulting from poor oral intake and insensible loss.  Intravenous crystalloid.  Continue with Lactated Ringer's to minimize chloride load.  6.  Transaminitis  Could consider \"atypical\" bacterial infection such as Legionella.  Urinary antigen is limited in sensitivity and specificity and well I would be unlikely to change treatment.  Continues with the ceftriaxone and doxycycline. She has had some decline in her transaminitis over her initial period of hospitalization, which may often be observed in viral infection.  No elevation of " bilirubin.  No thrombocytopenia.  Seasonally tickborne infection quite unlikely.  7.  Hyperglycemia of acute illness  Mild hyperglycemia.  Quite consistent with acute illness.  Does not require active treatment unless glucose consistently over 140.     DVT Prophylaxis: Enoxaparin subcutaneously  Code Status: Full Code     Disposition: Expected discharge difficult to determine pending clinical course    Quyen Storm    Interval History   Patient still spiking high fevers. She has dry, non-productive cough. Does not cover her cough and discussed importance of this. Complains of fatigue and just wants to sleep.     -Data reviewed today: I reviewed all new labs and imaging results over the last 24 hours. I personally reviewed no images or EKG's today.    Physical Exam   Temp: (!) 103.6  F (39.8  C) Temp src: Tympanic BP: 127/72 Pulse: 115 Heart Rate: (!) 126 Resp: 26 SpO2: 100 % O2 Device: None (Room air) Oxygen Delivery: 1/2 LPM  Vitals:    03/29/20 0223   Weight: 81.3 kg (179 lb 3.7 oz)     Vital Signs with Ranges  Temp:  [98.9  F (37.2  C)-104.5  F (40.3  C)] 103.6  F (39.8  C)  Pulse:  [111-118] 115  Heart Rate:  [105-138] 126  Resp:  [20-28] 26  BP: ()/() 127/72  SpO2:  [80 %-100 %] 100 %  I/O last 3 completed shifts:  In: 4104 [P.O.:1940; I.V.:2164]  Out: 3000 [Urine:3000]    Peripheral IV 03/28/20 Left (Active)   Site Assessment WDL 03/30/20 1000   Line Status Infusing 03/30/20 1000   Phlebitis Scale 0-->no symptoms 03/30/20 1000   Infiltration Scale 0 03/30/20 1000   Infiltration Site Treatment Method  None 03/30/20 1000   Extravasation? No 03/30/20 1000   Number of days: 2       Peripheral IV 03/30/20 Right (Active)   Site Assessment WDL 03/30/20 1000   Line Status Saline locked 03/30/20 1000   Phlebitis Scale 0-->no symptoms 03/30/20 1000   Infiltration Scale 0 03/30/20 1000   Number of days: 0       Peripheral IV 03/30/20 Right Upper forearm (Active)   Site Assessment WD 03/30/20 1000   Line  Status Saline locked 03/30/20 1000   Phlebitis Scale 0-->no symptoms 03/30/20 1000   Infiltration Scale 0 03/30/20 1000   Number of days: 0     Line/device assessment(s) completed for medical necessity    Constitutional: Lethargic, but oriented x3. No distress. Diaphoretic    HEENT: Normocephalic/atraumatic, PERRL, EOMI, mouth clear, neck supple, no LN.     Cardiovascular: RRR. No murmur, no  rubs, or gallops.      Respiratory:  Egophany RLL with diminished breath sounds. Left lung clear to auscultation    Abdomen: Soft, nontender, nondistended, no organomegaly. Bowel sounds present    Extremities: Warm/dry. No edema    Neuro:  Non focal, cranial nerves intact, Moves all extremities.    Medications     lactated ringers 125 mL/hr at 03/30/20 0714       cefTRIAXone  1 g Intravenous Q24H     doxycycline  intermittent infusion  100 mg Intravenous BID     enoxaparin ANTICOAGULANT  40 mg Subcutaneous Daily     lactobacillus rhamnosus (GG)  1 capsule Oral BID     sodium chloride (PF)  3 mL Intravenous Q8H     topiramate  100 mg Oral BID       Data   Recent Labs   Lab 03/30/20  0529 03/29/20  0506 03/29/20  0503 03/28/20  2055   WBC 4.5  --  7.9 12.0*   HGB 11.8  --  14.3 14.5   MCV 91  --  92 88   *  --  151 177     --  138 131*   POTASSIUM 3.5  --  4.1 3.0*   CHLORIDE 108  --  108 98   CO2 20  --  22 24   BUN 4*  --  7 8   CR 0.68  --  0.74 0.78   ANIONGAP 9  --  8 9   JUAN 8.0*  --  8.7 9.2   *  --  113* 120*   ALBUMIN 2.3*  --   --  3.2*   PROTTOTAL 5.7*  --   --  7.8   BILITOTAL 0.7  --   --  1.2   ALKPHOS 122  --   --  153*   * 166*  --  210*   AST 59* 63*  --  90*       No results found for this or any previous visit (from the past 24 hour(s)).

## 2020-03-30 NOTE — PROVIDER NOTIFICATION
Pt temp of 103.2 - gave 975 mg tylenol - chills present - Informed provider - no new orders at this time

## 2020-03-30 NOTE — PLAN OF CARE
"/70 (BP Location: Right arm)   Pulse 118   Temp (!) 103.4  F (39.7  C) (Tympanic)   Resp 24   Ht 1.702 m (5' 7\")   Wt 81.3 kg (179 lb 3.7 oz)   SpO2 93%   BMI 28.07 kg/m      Pt lethargic but oriented x4 upon initial assessment. Denies pain but feels \"uncomfortable\", Ultram given. Initial temp 103.5, treated with tylenol and ice packs with lowest temp at 101.2. Pt is cool and clammy, pale. Coarse lungs, satting 90% on RA, 1LPM applied and sats upto 93-97%. Frequent nonproductive cough, pt too lethargic to cover. HR tachy t/o night. BS active. Output great, 900 ml yellow urine. Intake adequate. SBA, very unsteady on feet, will not call.     In 0500 hour temp spiked to 104.0. Chills/shakes, lethargy. MD paged and presented to bedside. Stat Lactic and vitals q30min ordered. 1L bolus/2hours ordered and given, Tylenol, and ice packs. Temp slowly declining to 102.  At this time abdominal breathing noted, RR remains at 22. HR 110s. Sats 90s on 0.5LPM. /67. 3 IVs ( 2- 18G, 1- 22G) present and patent. Transferred as ICU at this time.     No falls or injuries this shift, will continue to monitor.     Face to face report given with opportunity to observe patient.    Report given to TITA Hurst RN   3/30/2020  7:17 AM      "

## 2020-03-30 NOTE — PLAN OF CARE
"Temp at 1042 40.3, Dr Manley notified via text page. MD placed orders for advil. Awaiting pharmacy verification. Cold rag applied to forehead, ice applied to back as that's all pt would tolerate d/t feeling \"cold\". Resting in bed with eyes closed. Ambulated to bathroom. Unsteady gait. Coughing on/off. Alarms active.   "

## 2020-03-30 NOTE — PHARMACY
[facility]    Pharmacy    Antimicrobial Stewardship Note     Current antimicrobial therapy:  Anti-infectives (From now, onward)    Start     Dose/Rate Route Frequency Ordered Stop    03/29/20 2100  cefTRIAXone in d5w (ROCEPHIN) intermittent infusion 1 g      1 g  over 30 Minutes Intravenous EVERY 24 HOURS 03/29/20 0206      03/29/20 1100  doxycycline (VIBRAMYCIN) 100 mg in D5W 250 mL intermittent infusion      100 mg  125-250 mL/hr over 1-2 Hours Intravenous 2 TIMES DAILY 03/29/20 0940          Indication: CAP    Days of Therapy: 3     Pertinent labs:  Creatinine   Creatinine   Date Value Ref Range Status   03/30/2020 0.68 0.52 - 1.04 mg/dL Final   03/29/2020 0.74 0.52 - 1.04 mg/dL Final   03/28/2020 0.78 0.52 - 1.04 mg/dL Final     WBC   WBC   Date Value Ref Range Status   03/30/2020 4.5 4.0 - 11.0 10e9/L Final   03/29/2020 7.9 4.0 - 11.0 10e9/L Final   03/28/2020 12.0 (H) 4.0 - 11.0 10e9/L Final     Procalcitonin   Procalcitonin   Date Value Ref Range Status   03/30/2020 0.21 ng/ml Final     Comment:     0.05-0.24 ng/ml Low risk of systemic bacterial infection. Local bacterial   infection possible.  Recommendation: Assess other clinical features of   infection. Discourage antibiotics unless strong clinical suspicion for serious   infection.     03/28/2020 0.28 ng/ml Final     Comment:     0.25-0.49 ng/ml  Possible early systemic infection or localized infection.     Recommendation: Encourage antibiotics only in the correct clinical context.   Consider obtaining blood cultures or other relevant cultures. Recheck PCT in   6-12 hours to ensure baseline low level. If repeat PCT is rising, consider   early systemic infection and consider starting antibiotics.       CRP   CRP Inflammation   Date Value Ref Range Status   03/28/2020 190.0 (H) 0.0 - 8.0 mg/L Final       Culture Results:   3/28-3/30: Urine culture midstream urine, mixed nam  3/28-3/30: Blood culture x2, no growth after 2 days   3/28:  Influenza/RSV, negative     Recommendations/Interventions:  1. No recommendations at this time. Will continue to monitor.     Mickie Velarde RPH  March 30, 2020

## 2020-03-30 NOTE — PLAN OF CARE
Prior to Admission Medication Reconciliation:     Medications added:   [] None  [x] As listed below:    Duloxetine- per [facility] records, rx verified, recently started this med     Medications deleted:   [x] None  [] As listed below:    Changes made to existing medications:   [] None  [x] As listed below:    Tramadol- rx 2 tabs q6h PRN    axert- [facility] records 12.5 mg not 6.25 (last filled 2018)    Input appropriate strength of topiramate    FYI: Landon has noted that patient stopped taking flexeril on 3/18/20 appt (see below in Care Everywhere source), nurse has it marked as still taking within the last week so I left it on PTA medlist since it is PRN, also updated frequency per RX    Last times/dates taken verified with patient:  [] Yes- completed myself  [] Nurse completed no changes made  [x] Unable to review with patient: patient is lethargic and unable to communicate.   [] Nurse completed/changes made:     Allergy modifications:    [x]Did not review  []Patient verified NKA  []Patient verified current existing allergies: no changes made  []New allergies: listed below      Medication reconciliation sources:   []Patient  []Patient family member/emergency contact: **  [][facility]  []Epic Chart Review  [x]Care Everywhere review: [facility]  [x]DULoxetine (Cymbalta) 30 MG delayed release capsule   Take 1 Cap by mouth one time a day. Do not crush. 30 Cap   0 03/18/2020     [x]traMADol (Ultram) 50 MG tablet   Take 2 Tabs by mouth every six hours as needed for Pain . 240 Tab   2 03/18/2020     [x]topiramate (Topamax) 100 MG tablet   Do not crush. 180 Tab   3 03/18/2020      [x]cyclobenzaprine (FLEXERIL) 10 MG tablet   TAKE 1 TABLET BY MOUTH THREE TIMES DAILY AS NEEDED FOR MUSCLE SPASMS 90 Tab   3 01/03/2020       X predniSONE (Deltasone) 20 MG tablet   TAKE 2 TABLETS BY MOUTH EVERY DAY FOR 3 DAYS THEN TAKE 1 TABLET BY MOUTH EVERY DAY WITH FOOD 13 Tab   0 03/12/2020     Prescription Sig Dispensed  "Refills Start Date End Date   X piroxicam (FELDENE) 20 MG capsule   TAKE 1 CAPSULE BY MOUTH EVERY DAY WITH FOOD. DO NOT CRUSH 90 Cap   2 11/20/2019       X INTROVALE 0.15-0.03 MG oral tablet   TAKE 1 TABLET BY MOUTH EVERY DAY 90 Tab   2 12/10/2019       [x]zolpidem CR (AMBIEN CR) 12.5 MG tablet   Take 1 Tab by mouth at bedtime. Tablets should not be divided, crushed, or chewed. 30 Tab   5 12/18/2019     \"The Topamax is controlling her headaches. She has not had a menstrual period in a year, but was using birth control. She stopped it several months ago and did not get a period either. She weaned herself off the fluoxetine wondering if the medication was making things worse instead of better. She did not notice much difference. She also stopped the Flexeril and the Feldene. She describes her back pain as being a different kind of pain then her joint pain.\" (3/18/2020)    Medications  Current Outpatient Medications 10/10/2019:  Medication Sig     zolpidem CR (AMBIEN CR) 12.5 MG tablet TAKE 1 TABLET BY MOUTH EVERY NIGHT AT BEDTIME     piroxicam (FELDENE) 20 MG capsule Take 1 Cap by mouth one time a day. Take with food; do not crush.     traMADol (ULTRAM) 50 MG tablet Take 2 Tabs by mouth every six hours as needed for Pain .     meclizine (ANTIVERT) 25 MG tablet Take 1 Tab by mouth three times a day as needed for Dizziness.     topiramate (TOPAMAX) 100 MG tablet TAKE 1 TABLET BY MOUTH TWICE DAILY. DO NOT CRUSH     FLUoxetine (PROZAC) 20 MG capsule TAKE 1 CAPSULE BY MOUTH EVERY DAY     cyclobenzaprine (FLEXERIL) 10 MG tablet TAKE 1 TABLET BY MOUTH THREE TIMES DAILY AS NEEDED FOR MUSCLE SPASMS     almotriptan (AXERT) 12.5 MG tablet TAKE ONE TABLET BY MOUTH AS DIRECTED. IF HEADACHE RETURNS, REPEAT DOSE AFTER 2 HOURS. MAX 2 DOSES PER DAY     levonorgestrel-ethinyl estradiol (INTROVALE) 0.15-0.03 MG oral tablet Take 1 Tab by mouth one time a day.     Cholecalciferol (VITAMIN D) 2000 UNIT tablet Take 1 Tab by mouth one time a " day.     cyanocobalamin (VITAMIN B-12) 1000 MCG tablet Take 1 Tab by mouth one time a day.     [x]Pharmacy med list: [facility]    almotriptan 12.5 mg- last sold 12/12/2018    Flexeril 10 mg, 1 tab TID last sold 1/03/2020    Duloxetine 30 mg, last sold 3/22/20    Introvale, last sold 12/12/2019- did not add due to note Landon wrote on 3/18/20    Meclizine 25 mg, 1 tab tid prn for dizziness- did not add because I could not discuss with patient, will notify providing    Piroxicam 20 mg daily, did not add due to note Landon wrote 3/18/2020    Prednisone 20 mg take 2 tabs by mouth every day for 3 days then one tablet every day with food- did not add 13 tabs dispensed 3/13/2020 with no refills. FYI: Patient does go on this intermittently.    Tramadol: last sold 3/13/20     Zolpidem 12.5 mg last sold 3/22/20  []Pharmacy phone call  [x]Outside meds dispense report: dispense dates do not reflect most recent fills but I included it for reference at bottom of note.   []Nursing home MAR:  [x]Other: Brenda RIVERS at [facility]      Pharmacy desired at discharge: [facility]    Is patient on coumadin?  [x]No    Requests for consultation by provider or pharmacist:   [] Patient understands why all of their meds were prescribed and how to take them. No questions.   [x] Fill dates coincide with compliancy for all maintenance meds.   [] Fill dates do not coincide with compliancy with maintenance meds. See notes in PTA medlist about how patient is taking.   [] Patient has questions about the following:      Comments: Nurse stated patient would be unable to communicate her meds with me. I updated as best as I could with RX information and chart review.       Flor Cook on 3/30/2020 at 8:57 AM       Discrepancies: [x] No []Not Applicable []Yes: listed below    Time spent on medication reconciliation:   []5-20 mins  [x]20-40 mins  []> 40 mins    Issues completing PTA medication reconciliation:  [] On hold for a long time  [] Waited for a call  back  [] Fax didn't come through  [x] Fax took a long time  [] Other:    Notifying appropriate party of changes/additions/discrepancies:  []No changes made, notification not necessary.   [x] Notified attending provider via text page  [] Notified attending provider in person  [] Notified pharmacy  [] Notified nurse  [] Attending provider not available, left detailed notes  [] Changes/additions made don't need provider notification because provider has not seen patient or input any orders  [] Changes/additions made don't need provider notification because changes made are to medications not ordered    Medications Prior to Admission   Medication Sig Dispense Refill Last Dose     cyclobenzaprine (FLEXERIL) 10 MG tablet Take 10 mg by mouth 3 times daily as needed for muscle spasms   Past Week at Unknown time     topiramate (TOPAMAX) 100 MG tablet Take 100 mg by mouth 2 times daily    3/28/2020 at Unknown time     traMADol (ULTRAM) 50 MG tablet Take 100 mg by mouth every 6 hours as needed for pain    3/28/2020 at Unknown time     vitamin D3 (CHOLECALCIFEROL) 2000 units tablet Take 2,000 Units by mouth daily   3/28/2020 at Unknown time     zolpidem (AMBIEN CR) 12.5 MG CR tablet Take 12.5 mg by mouth nightly as needed    3/28/2020 at Unknown time     almotriptan (AXERT) 12.5 MG tablet Take 12.5 mg by mouth at onset of headache as directed. If headache returns, repeat dose after 2 hours. Max 2 doses per day.   More than a month at Unknown time     DULoxetine (CYMBALTA) 30 MG capsule Take 30 mg by mouth daily   Unknown at Unknown time       Medication Dispense History (from 3/31/2019 to 10/23/2019)   Expand All  Collapse All   Cyclobenzaprine HCl      Dispensed  Days Supply  Quantity  Provider  Pharmacy    CYCLOBENZAPRINE 10MG TABLETS  10/07/2019  30  90 each  [facility]  [facility]...    CYCLOBENZAPRINE 10MG TABLETS  07/13/2019  30  90 each  [facility]  [facility] #...           FLUoxetine HCl      Dispensed  Days Supply  Quantity  Provider  Pharmacy    FLUOXETINE 20MG CAPSULES  10/20/2019  90  90 each  [facility]    FLUOXETINE 20MG CAPSULES  07/21/2019  90  90 each  [facility]...    FLUOXETINE 20MG CAPSULES  04/22/2019  90  90 each  [email or web address]...          [facility]                                   Meclizine HCl      Dispensed  Days Supply  Quantity  Provider  Pharmacy    MECLIZINE 25MG RX TABLETS  07/16/2019  7  21 each  [facility] #...          Piroxicam                           Topiramate      Dispensed  Days Supply  Quantity  Provider  Pharmacy    TOPIRAMATE 100MG TABLETS  08/22/2019  90  180 each  [email or web address]...    TOPIRAMATE 100MG TABLETS  05/15/2019  90  180 each  [facility] #...          Zolpidem Tartrate      Dispensed  Days Supply  Quantity  Provider  Pharmacy    ZOLPIDEM ER 12.5MG TABLETS  10/23/2019  30  30 each  [facility] #...    ZOLPIDEM ER 12.5MG TABLETS  09/27/2019  30  30 each  [email or web address]...    ZOLPIDEM ER 12.5MG TABLETS  09/01/2019  30  30 each  [facility] #...    ZOLPIDEM ER 12.5MG TABLETS  08/06/2019  30  30 each  [facility] #...    ZOLPIDEM ER 12.5MG TABLETS  07/11/2019  30  30 each  [facility] #...    ZOLPIDEM ER 12.5MG TABLETS  06/15/2019  30  30 each  [facility] #...    ZOLPIDEM ER 12.5MG TABLETS  05/20/2019  30  30 each  [facility] #...    ZOLPIDEM ER 12.5MG TABLETS  04/23/2019  30  30 each  [facility] #...          predniSONE      Dispensed  Days Supply  Quantity  Provider  Pharmacy    PREDNISONE 20MG TABLETS   10/21/2019  10  13 each  [facility] #...    PREDNISONE 20MG TABLETS  09/13/2019  10  13 each  [facility] #...    PREDNISONE 20MG TABLETS  08/11/2019  10  13 each  [facility] #...    PREDNISONE 20MG TABLETS  07/02/2019  10  13 each  [facility] #...    PREDNISONE 20MG TABLETS  05/12/2019  10  13 each  [facility] #...    PREDNISONE 20MG TABLETS  04/19/2019  10  13 each  [facility] #...          traMADol HCl      Dispensed  Days Supply  Quantity  Provider  Pharmacy    TRAMADOL 50MG TABLETS  10/15/2019  30  240 each  [facility] #...    TRAMADOL 50MG TABLETS  09/13/2019  30  240 each  [facility] #...    TRAMADOL 50MG TABLETS  08/02/2019  30  240 each  [facility] #...    TRAMADOL 50MG TABLETS  06/17/2019  30  240 each  [facility] #...    TRAMADOL 50MG TABLETS  05/06/2019  30  240 each  [facility] #...

## 2020-03-31 LAB
ANION GAP SERPL CALCULATED.3IONS-SCNC: 9 MMOL/L (ref 3–14)
BASOPHILS # BLD AUTO: 0 10E9/L (ref 0–0.2)
BASOPHILS NFR BLD AUTO: 0.7 %
BUN SERPL-MCNC: 4 MG/DL (ref 7–30)
CALCIUM SERPL-MCNC: 8.8 MG/DL (ref 8.5–10.1)
CHLORIDE SERPL-SCNC: 107 MMOL/L (ref 94–109)
CO2 SERPL-SCNC: 21 MMOL/L (ref 20–32)
CREAT SERPL-MCNC: 0.61 MG/DL (ref 0.52–1.04)
DIFFERENTIAL METHOD BLD: ABNORMAL
EOSINOPHIL # BLD AUTO: 0.1 10E9/L (ref 0–0.7)
EOSINOPHIL NFR BLD AUTO: 1.2 %
ERYTHROCYTE [DISTWIDTH] IN BLOOD BY AUTOMATED COUNT: 12.2 % (ref 10–15)
GFR SERPL CREATININE-BSD FRML MDRD: >90 ML/MIN/{1.73_M2}
GLUCOSE SERPL-MCNC: 85 MG/DL (ref 70–99)
HCT VFR BLD AUTO: 40.6 % (ref 35–47)
HGB BLD-MCNC: 13.3 G/DL (ref 11.7–15.7)
IMM GRANULOCYTES # BLD: 0.2 10E9/L (ref 0–0.4)
IMM GRANULOCYTES NFR BLD: 4.2 %
LACTATE BLD-SCNC: 1.2 MMOL/L (ref 0.7–2)
LYMPHOCYTES # BLD AUTO: 0.7 10E9/L (ref 0.8–5.3)
LYMPHOCYTES NFR BLD AUTO: 11.9 %
MCH RBC QN AUTO: 29.8 PG (ref 26.5–33)
MCHC RBC AUTO-ENTMCNC: 32.8 G/DL (ref 31.5–36.5)
MCV RBC AUTO: 91 FL (ref 78–100)
MONOCYTES # BLD AUTO: 0.5 10E9/L (ref 0–1.3)
MONOCYTES NFR BLD AUTO: 8.2 %
NEUTROPHILS # BLD AUTO: 4.2 10E9/L (ref 1.6–8.3)
NEUTROPHILS NFR BLD AUTO: 73.8 %
NRBC # BLD AUTO: 0 10*3/UL
NRBC BLD AUTO-RTO: 0 /100
PLATELET # BLD AUTO: 168 10E9/L (ref 150–450)
POTASSIUM SERPL-SCNC: 3.6 MMOL/L (ref 3.4–5.3)
PROCALCITONIN SERPL-MCNC: 0.33 NG/ML
RBC # BLD AUTO: 4.47 10E12/L (ref 3.8–5.2)
SODIUM SERPL-SCNC: 137 MMOL/L (ref 133–144)
WBC # BLD AUTO: 5.7 10E9/L (ref 4–11)

## 2020-03-31 PROCEDURE — 25000132 ZZH RX MED GY IP 250 OP 250 PS 637: Performed by: INTERNAL MEDICINE

## 2020-03-31 PROCEDURE — 85025 COMPLETE CBC W/AUTO DIFF WBC: CPT | Performed by: INTERNAL MEDICINE

## 2020-03-31 PROCEDURE — 25800030 ZZH RX IP 258 OP 636: Performed by: INTERNAL MEDICINE

## 2020-03-31 PROCEDURE — 84145 PROCALCITONIN (PCT): CPT | Performed by: INTERNAL MEDICINE

## 2020-03-31 PROCEDURE — 36415 COLL VENOUS BLD VENIPUNCTURE: CPT | Performed by: INTERNAL MEDICINE

## 2020-03-31 PROCEDURE — 20000003 ZZH R&B ICU

## 2020-03-31 PROCEDURE — 83605 ASSAY OF LACTIC ACID: CPT | Performed by: INTERNAL MEDICINE

## 2020-03-31 PROCEDURE — 25000125 ZZHC RX 250: Performed by: INTERNAL MEDICINE

## 2020-03-31 PROCEDURE — 25000128 H RX IP 250 OP 636: Performed by: INTERNAL MEDICINE

## 2020-03-31 PROCEDURE — 99233 SBSQ HOSP IP/OBS HIGH 50: CPT | Performed by: INTERNAL MEDICINE

## 2020-03-31 PROCEDURE — 80048 BASIC METABOLIC PNL TOTAL CA: CPT | Performed by: INTERNAL MEDICINE

## 2020-03-31 RX ORDER — ZOLPIDEM TARTRATE 5 MG/1
10 TABLET ORAL
Status: DISCONTINUED | OUTPATIENT
Start: 2020-03-31 | End: 2020-04-03 | Stop reason: HOSPADM

## 2020-03-31 RX ADMIN — ACETAMINOPHEN 975 MG: 325 TABLET, FILM COATED ORAL at 05:51

## 2020-03-31 RX ADMIN — ACETAMINOPHEN 650 MG: 325 TABLET, FILM COATED ORAL at 00:41

## 2020-03-31 RX ADMIN — TOPIRAMATE 100 MG: 100 TABLET, FILM COATED ORAL at 08:12

## 2020-03-31 RX ADMIN — SODIUM CHLORIDE 1000 ML: 9 INJECTION, SOLUTION INTRAVENOUS at 05:07

## 2020-03-31 RX ADMIN — Medication 1 CAPSULE: at 08:12

## 2020-03-31 RX ADMIN — IBUPROFEN 800 MG: 200 TABLET, FILM COATED ORAL at 04:49

## 2020-03-31 RX ADMIN — SODIUM CHLORIDE, POTASSIUM CHLORIDE, SODIUM LACTATE AND CALCIUM CHLORIDE: 600; 310; 30; 20 INJECTION, SOLUTION INTRAVENOUS at 19:30

## 2020-03-31 RX ADMIN — CEFTRIAXONE SODIUM 1 G: 1 INJECTION, SOLUTION INTRAVENOUS at 20:01

## 2020-03-31 RX ADMIN — ACETAMINOPHEN 975 MG: 325 TABLET, FILM COATED ORAL at 11:16

## 2020-03-31 RX ADMIN — DULOXETINE HYDROCHLORIDE 30 MG: 30 CAPSULE, DELAYED RELEASE ORAL at 08:12

## 2020-03-31 RX ADMIN — SODIUM CHLORIDE, POTASSIUM CHLORIDE, SODIUM LACTATE AND CALCIUM CHLORIDE: 600; 310; 30; 20 INJECTION, SOLUTION INTRAVENOUS at 00:41

## 2020-03-31 RX ADMIN — ENOXAPARIN SODIUM 40 MG: 40 INJECTION SUBCUTANEOUS at 08:12

## 2020-03-31 RX ADMIN — DOXYCYCLINE 100 MG: 100 INJECTION, POWDER, LYOPHILIZED, FOR SOLUTION INTRAVENOUS at 08:12

## 2020-03-31 RX ADMIN — TOPIRAMATE 100 MG: 100 TABLET, FILM COATED ORAL at 20:04

## 2020-03-31 RX ADMIN — ZOLPIDEM TARTRATE 10 MG: 5 TABLET, FILM COATED ORAL at 20:28

## 2020-03-31 RX ADMIN — ACETAMINOPHEN 650 MG: 325 TABLET, FILM COATED ORAL at 22:03

## 2020-03-31 RX ADMIN — DOXYCYCLINE 100 MG: 100 INJECTION, POWDER, LYOPHILIZED, FOR SOLUTION INTRAVENOUS at 20:30

## 2020-03-31 RX ADMIN — IBUPROFEN 800 MG: 200 TABLET, FILM COATED ORAL at 14:37

## 2020-03-31 RX ADMIN — Medication 1 CAPSULE: at 20:04

## 2020-03-31 ASSESSMENT — ACTIVITIES OF DAILY LIVING (ADL)
ADLS_ACUITY_SCORE: 13
ADLS_ACUITY_SCORE: 14
ADLS_ACUITY_SCORE: 13
ADLS_ACUITY_SCORE: 14

## 2020-03-31 NOTE — PLAN OF CARE
Temperature recheck after ibuprofen was 103.5, pt given 975mg of tylenol, ice water, and was ice packed with cold wash cloth to forehead. Will closely monitor.

## 2020-03-31 NOTE — PLAN OF CARE
Face to face report given with opportunity to observe patient.    Report given to TITA Suero RN   3/31/2020  7:18 AM

## 2020-03-31 NOTE — PLAN OF CARE
Lethargic but remains oriented. Intermittent confusion associated with increase in body temperature. Occasionally says something that doesn't make sense but reorients self and correct sentence. Afebrile from 0998-1866, tylenol given at 0040 for beginning signs of temp with fever only 99.7, began shivering with an increase in HR around 0400- temp 100.3 and given ibuprofen as needed. Lungs are coarse throughout, sats >90% on room air. Non productive, congested, barking cough through night. 1 IV remaining in R upper forearm, 1L bolus currently infusing at 500ml/hr. Good oral intake, voided 4 times through night- missed hat in toilet a few times. Will continue to monitor.

## 2020-03-31 NOTE — PHARMACY
[facility]    Pharmacy    Antimicrobial Stewardship Note     Current antimicrobial therapy:  Anti-infectives (From now, onward)    Start     Dose/Rate Route Frequency Ordered Stop    03/29/20 2100  cefTRIAXone in d5w (ROCEPHIN) intermittent infusion 1 g      1 g  over 30 Minutes Intravenous EVERY 24 HOURS 03/29/20 0206      03/29/20 1100  doxycycline (VIBRAMYCIN) 100 mg in D5W 250 mL intermittent infusion      100 mg  125-250 mL/hr over 1-2 Hours Intravenous 2 TIMES DAILY 03/29/20 0940          Indication: CAP    Days of Therapy: 4     Pertinent labs:  Creatinine   Creatinine   Date Value Ref Range Status   03/31/2020 0.61 0.52 - 1.04 mg/dL Final   03/30/2020 0.68 0.52 - 1.04 mg/dL Final   03/29/2020 0.74 0.52 - 1.04 mg/dL Final     WBC   WBC   Date Value Ref Range Status   03/31/2020 5.7 4.0 - 11.0 10e9/L Final   03/30/2020 4.5 4.0 - 11.0 10e9/L Final   03/29/2020 7.9 4.0 - 11.0 10e9/L Final     Procalcitonin   Procalcitonin   Date Value Ref Range Status   03/31/2020 0.33 ng/ml Final     Comment:     0.25-0.49 ng/ml  Possible early systemic infection or localized infection.     Recommendation: Encourage antibiotics only in the correct clinical context.   Consider obtaining blood cultures or other relevant cultures. Recheck PCT in   6-12 hours to ensure baseline low level. If repeat PCT is rising, consider   early systemic infection and consider starting antibiotics.     03/30/2020 0.21 ng/ml Final     Comment:     0.05-0.24 ng/ml Low risk of systemic bacterial infection. Local bacterial   infection possible.  Recommendation: Assess other clinical features of   infection. Discourage antibiotics unless strong clinical suspicion for serious   infection.     03/28/2020 0.28 ng/ml Final     Comment:     0.25-0.49 ng/ml  Possible early systemic infection or localized infection.     Recommendation: Encourage antibiotics only in the correct clinical context.   Consider obtaining blood cultures or other relevant  cultures. Recheck PCT in   6-12 hours to ensure baseline low level. If repeat PCT is rising, consider   early systemic infection and consider starting antibiotics.       CRP   CRP Inflammation   Date Value Ref Range Status   03/28/2020 190.0 (H) 0.0 - 8.0 mg/L Final       Culture Results:   3/28-3/30: Urine culture aerobic, mixed nam  3/28-3/31: Blood culture x 2, no growth after 3 days  3/28: Influenza A/B, RSV, negative     Recommendations/Interventions:  1. No recommendations at this time. Will continue to monitor.     Mickie Velarde RPH  March 31, 2020

## 2020-03-31 NOTE — PROGRESS NOTES
"[facility]    Hospitalist Progress Note      Assessment & Plan   Dilcia Santillan is a 49 year old woman who was admitted on 3/28/2020.  She has a relatively sparse medical history with medically managed migraine headaches.  For the 4-6 days preceding her admission, she had minimally productive cough with brown to clear sputum, myalgias, and fevers.  Fevers had been relatively persistent.  She was concerned about her symptoms and isolated herself at home.  She was able to take oral fluid although has been anorexic and has minimal food intake.  Fevers symptomatically responded to acetaminophen.  She generally felt increasingly unwell and fatigued leading her to present to emergency department where fever of 102  F and mild tachypnea was noted.  Neutrophilia with WBC of 12.  Elevated lactic acid which responded to initial treatment in emergency department.  She had a mild transaminitis.  In addition potassium was mildly decreased.  Chest radiograph, confirmed by CT in \"angiogram\" protocol confirmed a left lower lobe airspace infiltrate with air bronchograms.  She was admitted for further evaluation and management.  COVID PCR obtained.     1.  Acute respiratory failure  - O2 sats continue to be stable, mid to upper 90s on room air. .    - No evidence of hypercapnia on serum chemistries  - She remains dyspneic with tachypnea  - Continues to have persistent tachycardia, not unexpected and is physiologic response to her acute infectious process  - Although she does have persistent fever with some tachycardia/tachypnea - has remained largely clinically stable over past 48 hours, so will plan to transfer to med/surg today with continuous pulse oximetry monitoring and telemetry for now. Vitals q4h for now.    2.  Lower respiratory tract infection  - Focal infiltrate is most suggestive of bacterial pneumonia and agree with treatment directed at community organisms  - COVID 19 resulted as not detected and influenza " "PCR returned negative  - Relatively low procalcitonin.  She has had persistent fevers over several days now, since admission on 3/29/20.  Fevers have continued to run high, yet today now for the first time was down to 98.4 this morning. Continue with tylenol and ibuprofen PRN.  Blood cultures appropriately obtained in emergency department with no growth to date.  Seems to be remaining stable from respiratory standpoint and will continue current cares.  Yet CXR last night does show some persistent and slightly worsened RLL infiltrate and interval development of new LLL consolidation. This may be a reflection of increased extravascular lung water. She has had 6.5 L total fluid boluses since admission. Yet may consider broadening antibiotic coverage if no improvement in fever curve or worsening respiratory status over next 24 hours. Slight increase in her procalcitonin today, although unclear that this represents a meaningful increase  - Continue on Ceftriaxone and doxycycline for now    3.  Sepsis  - Meets most sensitive criteria.  Perhaps a borderline metabolic encephalopathy suggesting end organ dysfunction.  She does have persistent tachycardia.  Initially with some mild volume depletion because of large insensible loss with persistent fevers. She does have some ongoing insensible fluid loss and continues on maintenance fluids, yet I think she is closer to euvolemic at this point.     4.  Metabolic encephalopathy  - While she is easily aroused and alert she still remains mildly somnolent.  Responses minimally slowed.  Quite consistent with encephalopathy based on acute illness. No focal findings.    5.  Volume depletion  - Borderline volume depletion.  Likely resulting from poor oral intake and insensible loss.    - Continue with Lactated Ringer's to minimize chloride load. Think she is closer to euvolemic at this point    6. Transaminitis  - Could consider \"atypical\" bacterial infection such as Legionella.  " Urinary antigen is limited in sensitivity and specificity and would be unlikely to change treatment.    - Continue with the ceftriaxone and doxycycline.   - She has had some decline in her transaminitis over her initial period of hospitalization, which may often be observed in viral infection.  No elevation of bilirubin.  No thrombocytopenia.  Seasonally tickborne infection quite unlikely.    7.  Hyperglycemia of acute illness  - Mild hyperglycemia.  Quite consistent with acute illness.  Does not require active treatment unless glucose consistently over 140.     DVT Prophylaxis: Enoxaparin subcutaneously  Code Status: Full Code     Disposition: Expected discharge difficult to determine pending clinical course. Likely still 3-4 days    Sereen GABRIELE Storm    Interval History   Patient still spiking fevers. Very fatigued and lethargic yet. Continues to have cough. Temp down to 98.4 this morning. Does not complain of any breathing distress. Sats have been stable.    -Data reviewed today: I reviewed all new labs and imaging results over the last 24 hours. I personally reviewed no images or EKG's today.    Physical Exam   Temp: 98.4  F (36.9  C) Temp src: Tympanic BP: 118/64 Pulse: 102 Heart Rate: 103 Resp: 20 SpO2: 93 % O2 Device: None (Room air)    Vitals:    03/29/20 0223   Weight: 81.3 kg (179 lb 3.7 oz)     Vital Signs with Ranges  Temp:  [97.8  F (36.6  C)-104.7  F (40.4  C)] 98.4  F (36.9  C)  Pulse:  [] 102  Heart Rate:  [] 103  Resp:  [20-28] 20  BP: ()/(54-98) 118/64  SpO2:  [83 %-100 %] 93 %  I/O last 3 completed shifts:  In: 7975 [P.O.:1935; I.V.:3040; IV Piggyback:3000]  Out: 2500 [Urine:2500]    Peripheral IV 03/30/20 Right (Active)   Site Assessment WDL 03/31/20 0700   Line Status Infusing 03/31/20 0700   Phlebitis Scale 0-->no symptoms 03/31/20 0700   Infiltration Scale 0 03/31/20 0700   Infiltration Site Treatment Method  None 03/30/20 1700   Number of days: 1     Line/device assessment(s)  completed for medical necessity    Constitutional: Lethargic, but oriented x3. No distress. Diaphoretic    HEENT: Normocephalic/atraumatic, PERRL, EOMI, mouth clear, neck supple, no LN.     Cardiovascular: RRR. No murmur, no  rubs, or gallops.      Respiratory:  Egophany RLL with diminished breath sounds. Ronchi bilateral lower lobes.     Abdomen: Soft, nontender, nondistended, no organomegaly. Bowel sounds present    Extremities: Warm/dry. No edema    Neuro:  Non focal, cranial nerves intact, Moves all extremities.    Medications     lactated ringers 125 mL/hr at 03/31/20 0041       cefTRIAXone  1 g Intravenous Q24H     doxycycline  intermittent infusion  100 mg Intravenous BID     DULoxetine  30 mg Oral Daily     enoxaparin ANTICOAGULANT  40 mg Subcutaneous Daily     lactobacillus rhamnosus (GG)  1 capsule Oral BID     sodium chloride (PF)  3 mL Intravenous Q8H     topiramate  100 mg Oral BID       Data   Recent Labs   Lab 03/31/20  0454 03/30/20  0529 03/29/20  0506 03/29/20  0503 03/28/20  2055   WBC 5.7 4.5  --  7.9 12.0*   HGB 13.3 11.8  --  14.3 14.5   MCV 91 91  --  92 88    137*  --  151 177    137  --  138 131*   POTASSIUM 3.6 3.5  --  4.1 3.0*   CHLORIDE 107 108  --  108 98   CO2 21 20  --  22 24   BUN 4* 4*  --  7 8   CR 0.61 0.68  --  0.74 0.78   ANIONGAP 9 9  --  8 9   JUAN 8.8 8.0*  --  8.7 9.2   GLC 85 104*  --  113* 120*   ALBUMIN  --  2.3*  --   --  3.2*   PROTTOTAL  --  5.7*  --   --  7.8   BILITOTAL  --  0.7  --   --  1.2   ALKPHOS  --  122  --   --  153*   ALT  --  114* 166*  --  210*   AST  --  59* 63*  --  90*       Recent Results (from the past 24 hour(s))   XR Chest Port 1 View    Narrative    PROCEDURE:  XR CHEST PORT 1 VW    HISTORY: pneumonia follow up. .    COMPARISON:  3/28/2020.    FINDINGS:    The cardiomediastinal contours are stable.  Consolidation the right lower lobe is extensive, similar to prior.  There is patchy consolidation at the left lung base. No  pneumothorax  is seen.      Impression    IMPRESSION:  New left lower lobe consolidation. Persistent extensive  right lower lobe consolidation.      BARTOLO GARVIN MD

## 2020-03-31 NOTE — PLAN OF CARE
Moved to [facility] status today. Remains alert and oriented but lethargic. VSS on room air. Tmax 99.5 medicated with tylenol and ibuprofen today. Pt did complain of some rib pain today from coughing but improved with rest and medication. Otherwise has denied pain. Lung sound coarse throughout but better air movement noted throughout the day. Up independently in room. LR infusing at 125ml/hr. Pt has poor appetite. Pale but color improving. Pt states she is starting to feel better. IV abx continue.     Face to face report given with opportunity to observe patient.    Report given to TITA Vazquez RN   3/31/2020  6:56 PM

## 2020-03-31 NOTE — PLAN OF CARE
Received call from [facility] reporting that COVID 19 swab has resulted negative. Jennifer ORTEGA RN notified.

## 2020-04-01 PROBLEM — M79.7 FIBROMYALGIA: Status: ACTIVE | Noted: 2020-03-18

## 2020-04-01 PROBLEM — Z79.891 LONG TERM (CURRENT) USE OF OPIATE ANALGESIC: Status: ACTIVE | Noted: 2020-03-18

## 2020-04-01 LAB
ALBUMIN SERPL-MCNC: 2.2 G/DL (ref 3.4–5)
ALBUMIN SERPL-MCNC: NORMAL G/DL (ref 3.4–5)
ALP SERPL-CCNC: 141 U/L (ref 40–150)
ALP SERPL-CCNC: NORMAL U/L (ref 40–150)
ALT SERPL W P-5'-P-CCNC: 138 U/L (ref 0–50)
ALT SERPL W P-5'-P-CCNC: NORMAL U/L (ref 0–50)
ANION GAP SERPL CALCULATED.3IONS-SCNC: 10 MMOL/L (ref 3–14)
ANION GAP SERPL CALCULATED.3IONS-SCNC: NORMAL MMOL/L (ref 3–14)
AST SERPL W P-5'-P-CCNC: 141 U/L (ref 0–45)
AST SERPL W P-5'-P-CCNC: NORMAL U/L (ref 0–45)
BASOPHILS # BLD AUTO: 0 10E9/L (ref 0–0.2)
BASOPHILS # BLD AUTO: NORMAL 10E9/L (ref 0–0.2)
BASOPHILS NFR BLD AUTO: 0.4 %
BASOPHILS NFR BLD AUTO: NORMAL %
BILIRUB SERPL-MCNC: 0.7 MG/DL (ref 0.2–1.3)
BILIRUB SERPL-MCNC: NORMAL MG/DL (ref 0.2–1.3)
BUN SERPL-MCNC: 3 MG/DL (ref 7–30)
BUN SERPL-MCNC: NORMAL MG/DL (ref 7–30)
CALCIUM SERPL-MCNC: 8.4 MG/DL (ref 8.5–10.1)
CALCIUM SERPL-MCNC: NORMAL MG/DL (ref 8.5–10.1)
CHLORIDE SERPL-SCNC: 109 MMOL/L (ref 94–109)
CHLORIDE SERPL-SCNC: NORMAL MMOL/L (ref 94–109)
CO2 SERPL-SCNC: 20 MMOL/L (ref 20–32)
CO2 SERPL-SCNC: NORMAL MMOL/L (ref 20–32)
CREAT SERPL-MCNC: 0.52 MG/DL (ref 0.52–1.04)
CREAT SERPL-MCNC: NORMAL MG/DL (ref 0.52–1.04)
CRP SERPL-MCNC: 242 MG/L (ref 0–8)
DIFFERENTIAL METHOD BLD: ABNORMAL
DIFFERENTIAL METHOD BLD: NORMAL
EOSINOPHIL # BLD AUTO: 0.1 10E9/L (ref 0–0.7)
EOSINOPHIL # BLD AUTO: NORMAL 10E9/L (ref 0–0.7)
EOSINOPHIL NFR BLD AUTO: 1.5 %
EOSINOPHIL NFR BLD AUTO: NORMAL %
ERYTHROCYTE [DISTWIDTH] IN BLOOD BY AUTOMATED COUNT: 12.3 % (ref 10–15)
ERYTHROCYTE [DISTWIDTH] IN BLOOD BY AUTOMATED COUNT: NORMAL % (ref 10–15)
GFR SERPL CREATININE-BSD FRML MDRD: >90 ML/MIN/{1.73_M2}
GFR SERPL CREATININE-BSD FRML MDRD: >90 ML/MIN/{1.73_M2}
GLUCOSE SERPL-MCNC: 97 MG/DL (ref 70–99)
GLUCOSE SERPL-MCNC: NORMAL MG/DL (ref 70–99)
HCT VFR BLD AUTO: 32.1 % (ref 35–47)
HCT VFR BLD AUTO: NORMAL % (ref 35–47)
HGB BLD-MCNC: 11.2 G/DL (ref 11.7–15.7)
HGB BLD-MCNC: NORMAL G/DL (ref 11.7–15.7)
IMM GRANULOCYTES # BLD: 0.2 10E9/L (ref 0–0.4)
IMM GRANULOCYTES # BLD: NORMAL 10E9/L (ref 0–0.4)
IMM GRANULOCYTES NFR BLD: 3.5 %
IMM GRANULOCYTES NFR BLD: NORMAL %
LACTATE BLD-SCNC: 0.9 MMOL/L (ref 0.7–2)
LYMPHOCYTES # BLD AUTO: 0.7 10E9/L (ref 0.8–5.3)
LYMPHOCYTES # BLD AUTO: NORMAL 10E9/L (ref 0.8–5.3)
LYMPHOCYTES NFR BLD AUTO: 13.9 %
LYMPHOCYTES NFR BLD AUTO: NORMAL %
MCH RBC QN AUTO: 30.7 PG (ref 26.5–33)
MCH RBC QN AUTO: NORMAL PG (ref 26.5–33)
MCHC RBC AUTO-ENTMCNC: 34.9 G/DL (ref 31.5–36.5)
MCHC RBC AUTO-ENTMCNC: NORMAL G/DL (ref 31.5–36.5)
MCV RBC AUTO: 88 FL (ref 78–100)
MCV RBC AUTO: NORMAL FL (ref 78–100)
MONOCYTES # BLD AUTO: 0.5 10E9/L (ref 0–1.3)
MONOCYTES # BLD AUTO: NORMAL 10E9/L (ref 0–1.3)
MONOCYTES NFR BLD AUTO: 9.3 %
MONOCYTES NFR BLD AUTO: NORMAL %
NEUTROPHILS # BLD AUTO: 3.7 10E9/L (ref 1.6–8.3)
NEUTROPHILS # BLD AUTO: NORMAL 10E9/L (ref 1.6–8.3)
NEUTROPHILS NFR BLD AUTO: 71.4 %
NEUTROPHILS NFR BLD AUTO: NORMAL %
NRBC # BLD AUTO: 0 10*3/UL
NRBC # BLD AUTO: NORMAL 10*3/UL
NRBC BLD AUTO-RTO: 0 /100
NRBC BLD AUTO-RTO: NORMAL /100
PLATELET # BLD AUTO: 164 10E9/L (ref 150–450)
PLATELET # BLD AUTO: NORMAL 10E9/L (ref 150–450)
POTASSIUM SERPL-SCNC: 3 MMOL/L (ref 3.4–5.3)
POTASSIUM SERPL-SCNC: NORMAL MMOL/L (ref 3.4–5.3)
PROCALCITONIN SERPL-MCNC: 0.32 NG/ML
PROCALCITONIN SERPL-MCNC: NORMAL NG/ML
PROT SERPL-MCNC: 5.7 G/DL (ref 6.8–8.8)
PROT SERPL-MCNC: NORMAL G/DL (ref 6.8–8.8)
RBC # BLD AUTO: 3.65 10E12/L (ref 3.8–5.2)
RBC # BLD AUTO: NORMAL 10E12/L (ref 3.8–5.2)
SODIUM SERPL-SCNC: 139 MMOL/L (ref 133–144)
SODIUM SERPL-SCNC: NORMAL MMOL/L (ref 133–144)
WBC # BLD AUTO: 5.2 10E9/L (ref 4–11)
WBC # BLD AUTO: NORMAL 10E9/L (ref 4–11)

## 2020-04-01 PROCEDURE — 80053 COMPREHEN METABOLIC PANEL: CPT | Performed by: NURSE PRACTITIONER

## 2020-04-01 PROCEDURE — 36415 COLL VENOUS BLD VENIPUNCTURE: CPT | Performed by: INTERNAL MEDICINE

## 2020-04-01 PROCEDURE — 87633 RESP VIRUS 12-25 TARGETS: CPT | Performed by: NURSE PRACTITIONER

## 2020-04-01 PROCEDURE — 25000128 H RX IP 250 OP 636: Performed by: INTERNAL MEDICINE

## 2020-04-01 PROCEDURE — 84145 PROCALCITONIN (PCT): CPT | Performed by: NURSE PRACTITIONER

## 2020-04-01 PROCEDURE — 25800030 ZZH RX IP 258 OP 636: Performed by: INTERNAL MEDICINE

## 2020-04-01 PROCEDURE — 25000132 ZZH RX MED GY IP 250 OP 250 PS 637: Performed by: INTERNAL MEDICINE

## 2020-04-01 PROCEDURE — 85025 COMPLETE CBC W/AUTO DIFF WBC: CPT | Performed by: INTERNAL MEDICINE

## 2020-04-01 PROCEDURE — 36415 COLL VENOUS BLD VENIPUNCTURE: CPT | Performed by: NURSE PRACTITIONER

## 2020-04-01 PROCEDURE — 99232 SBSQ HOSP IP/OBS MODERATE 35: CPT | Performed by: NURSE PRACTITIONER

## 2020-04-01 PROCEDURE — 40000275 ZZH STATISTIC RCP TIME EA 10 MIN

## 2020-04-01 PROCEDURE — 25000125 ZZHC RX 250: Performed by: INTERNAL MEDICINE

## 2020-04-01 PROCEDURE — 83605 ASSAY OF LACTIC ACID: CPT | Performed by: NURSE PRACTITIONER

## 2020-04-01 PROCEDURE — 85025 COMPLETE CBC W/AUTO DIFF WBC: CPT | Performed by: NURSE PRACTITIONER

## 2020-04-01 PROCEDURE — 87635 SARS-COV-2 COVID-19 AMP PRB: CPT | Performed by: NURSE PRACTITIONER

## 2020-04-01 PROCEDURE — 87486 CHLMYD PNEUM DNA AMP PROBE: CPT | Performed by: NURSE PRACTITIONER

## 2020-04-01 PROCEDURE — 94664 DEMO&/EVAL PT USE INHALER: CPT

## 2020-04-01 PROCEDURE — 94799 UNLISTED PULMONARY SVC/PX: CPT

## 2020-04-01 PROCEDURE — 84145 PROCALCITONIN (PCT): CPT | Performed by: INTERNAL MEDICINE

## 2020-04-01 PROCEDURE — 20000003 ZZH R&B ICU

## 2020-04-01 PROCEDURE — 86140 C-REACTIVE PROTEIN: CPT | Performed by: NURSE PRACTITIONER

## 2020-04-01 PROCEDURE — 80053 COMPREHEN METABOLIC PANEL: CPT | Performed by: INTERNAL MEDICINE

## 2020-04-01 PROCEDURE — 87581 M.PNEUMON DNA AMP PROBE: CPT | Performed by: NURSE PRACTITIONER

## 2020-04-01 RX ORDER — ALBUTEROL SULFATE 90 UG/1
2 AEROSOL, METERED RESPIRATORY (INHALATION) EVERY 6 HOURS PRN
Status: DISCONTINUED | OUTPATIENT
Start: 2020-04-01 | End: 2020-04-01

## 2020-04-01 RX ORDER — ALBUTEROL SULFATE 90 UG/1
2 AEROSOL, METERED RESPIRATORY (INHALATION) EVERY 4 HOURS PRN
Status: DISCONTINUED | OUTPATIENT
Start: 2020-04-01 | End: 2020-04-03 | Stop reason: HOSPADM

## 2020-04-01 RX ADMIN — Medication 1 CAPSULE: at 20:12

## 2020-04-01 RX ADMIN — DULOXETINE HYDROCHLORIDE 30 MG: 30 CAPSULE, DELAYED RELEASE ORAL at 08:33

## 2020-04-01 RX ADMIN — ACETAMINOPHEN 975 MG: 325 TABLET, FILM COATED ORAL at 02:03

## 2020-04-01 RX ADMIN — CEFTRIAXONE SODIUM 1 G: 1 INJECTION, SOLUTION INTRAVENOUS at 20:18

## 2020-04-01 RX ADMIN — TOPIRAMATE 100 MG: 100 TABLET, FILM COATED ORAL at 20:12

## 2020-04-01 RX ADMIN — ACETAMINOPHEN 975 MG: 325 TABLET, FILM COATED ORAL at 20:11

## 2020-04-01 RX ADMIN — ACETAMINOPHEN 975 MG: 325 TABLET, FILM COATED ORAL at 15:39

## 2020-04-01 RX ADMIN — GUAIFENESIN AND DEXTROMETHORPHAN 15 ML: 100; 10 SYRUP ORAL at 20:11

## 2020-04-01 RX ADMIN — ZOLPIDEM TARTRATE 10 MG: 5 TABLET, FILM COATED ORAL at 20:12

## 2020-04-01 RX ADMIN — CYCLOBENZAPRINE 10 MG: 5 TABLET, FILM COATED ORAL at 20:57

## 2020-04-01 RX ADMIN — BENZONATATE 200 MG: 100 CAPSULE ORAL at 13:08

## 2020-04-01 RX ADMIN — SODIUM CHLORIDE, POTASSIUM CHLORIDE, SODIUM LACTATE AND CALCIUM CHLORIDE: 600; 310; 30; 20 INJECTION, SOLUTION INTRAVENOUS at 04:56

## 2020-04-01 RX ADMIN — GUAIFENESIN AND DEXTROMETHORPHAN 15 ML: 100; 10 SYRUP ORAL at 13:10

## 2020-04-01 RX ADMIN — ENOXAPARIN SODIUM 40 MG: 40 INJECTION SUBCUTANEOUS at 08:32

## 2020-04-01 RX ADMIN — IBUPROFEN 800 MG: 200 TABLET, FILM COATED ORAL at 04:05

## 2020-04-01 RX ADMIN — DOXYCYCLINE 100 MG: 100 INJECTION, POWDER, LYOPHILIZED, FOR SOLUTION INTRAVENOUS at 08:29

## 2020-04-01 RX ADMIN — TOPIRAMATE 100 MG: 100 TABLET, FILM COATED ORAL at 08:32

## 2020-04-01 RX ADMIN — TRAMADOL HYDROCHLORIDE 50 MG: 50 TABLET ORAL at 23:35

## 2020-04-01 RX ADMIN — Medication 1 CAPSULE: at 08:32

## 2020-04-01 RX ADMIN — DOXYCYCLINE 100 MG: 100 INJECTION, POWDER, LYOPHILIZED, FOR SOLUTION INTRAVENOUS at 21:04

## 2020-04-01 ASSESSMENT — ACTIVITIES OF DAILY LIVING (ADL)
ADLS_ACUITY_SCORE: 13
ADLS_ACUITY_SCORE: 12
ADLS_ACUITY_SCORE: 13
ADLS_ACUITY_SCORE: 12

## 2020-04-01 NOTE — PLAN OF CARE
Pt remains alert and oriented but very lethargic. Easily fatigued with any activity. Up to the chair twice today. Appetite poor but starting to improve this evening. COVID 19 and Respiratory panel sent to lab. Still needs a sputum sample but cough continues to be frequent nonproductive and dry. Robitussin and tessalon pearls given for cough with some relief. TMax 100.4, medicated with tylenol down to 99.8. Blood pressure stable. Did have one elevated BP after a coughing fit, rechecked after a while and BP back WNL. Required 2lpm of oxygen this morning was able to take off, has remained on room air majority of the day with SpO2 90-93%. Pt needs continued education on using acapella and deep breathing. Encouraged activity within the room. Pt was up for very short periods of time before becoming fatigued. Up independently to bathroom. Remains tachycardic, with resting heart rate 90-100s and 110-120s with activity. Complains of pain to back and ribs with coughing. Lung sounds coarse and diminished. New IV placed to left arm and remains saline locked. IV Abx continue.     Face to face report given with opportunity to observe patient.    Report given to TITA Arzate RN   4/1/2020  7:01 PM

## 2020-04-01 NOTE — PLAN OF CARE
Blood pressure has come down, patient is reporting she is having a difficult time talking and stated she is feeling short of breath, patient was administered tessalon perles and cough syrup for hacky dry cough. Patient is afebrile.

## 2020-04-01 NOTE — PLAN OF CARE
Pt is A+O x 4, but remains lethargic. Max temp of 103.8, alternated tylenol and ibuprofen and applied ice packs. Pt has been -120's at rest 120-140's with activity. LS are coarse throughout, placed on 1 L NC to maintain oxygen saturation >89%. Pt has frequent, dry, nonproductive cough. Pt is ambulating independently to the bathroom. Voiding QS, yellow urine. Pt rating headache pain 8/10, prn tylenol given, and cold wash cloth applied to forehead. IV is infusing LR @125 mL/hr, IV abx given. Prn Ambien given at HS.

## 2020-04-01 NOTE — PROGRESS NOTES
[facility]    Hospitalist Progress Note    Date of Service (when I saw the patient): 04/01/2020    Assessment & Plan      Acute respiratory failure: Mild with hypoxia. She has been off oxygen for several days but did have some minimal hypoxia overnight that required 1 liter of oxygen.     Pneumonia: Bacterial versus viral. On initial CT scan of chest she had prominent RLL infiltrate with fever, cough, possible early sepsis and dehydration. She was started on Rocephin and Doxycyclcine on arrival. Unfortunately she continues to run high fevers though these have become less frequent. She continues to have a dry hacking cough, needed 1 liter of oxygen during the night but titrated off this morning. Her procalcitonin is negligible and WBC has been normal for several days and she has persistent elevated LFT's as well. This is all highly suspicious of viral origin. She was tested for COVID-19 on arrival in the ED. However since symptoms continue to match closely with expected COVID symptoms will retest her and reinstitute droplet/contact precautions.       Possible early Sepsis: Meets most sensitive criteria on presentation.  Perhaps a borderline metabolic encephalopathy suggesting end organ dysfunction though she did not have any other indicators of end organ dysfunction. She did have persistent tachycardia which has now resolved except during times of fever.      Metabolic encephalopathy:  Possibly due to encephalopathy based on acute illness. No focal findings. She is awake and alert today, though continues to spend most of her time sleeping throughout the day and night. She also has known mild cognitive impairment which may be exacerbated in acute illness.       Volume depletion: Borderline volume depletion on arrival.  Likely resulting from poor oral intake and insensible loss.  She has received large volume of fluids over the course of her stay. Voiding up to 2 liters per shift and taking in oral  "adequately. Saline lock.      Transaminitis: Could consider \"atypical\" bacterial infection such as Legionella-this presumably covered by doxycycline as avoiding use of azithromycin.       Hyperglycemia of acute illness: Mild hyperglycemia due to acute onset illness on arrival, now resolved.          DVT Prophylaxis: Enoxaparin (Lovenox) SQ  Code Status: Full Code    Disposition: Expected discharge in several days once stablized.    Lauren ARNOL Schultz,CNP    Interval History   Didn't sleep well last night, denies dyspnea at rest. Still no appetite, drinking well.  Still has a dry cough. Denies nausea, chest pain, myalgias.     -Data reviewed today: I reviewed all new labs and imaging results over the last 24 hours.    Physical Exam   Temp: 98.1  F (36.7  C) Temp src: Tympanic BP: 113/73 Pulse: 114 Heart Rate: 92 Resp: 16 SpO2: 92 % O2 Device: None (Room air) Oxygen Delivery: 1 LPM  Vitals:    03/29/20 0223   Weight: 81.3 kg (179 lb 3.7 oz)     Vital Signs with Ranges  Temp:  [98.1  F (36.7  C)-103.8  F (39.9  C)] 98.1  F (36.7  C)  Pulse:  [102-118] 114  Heart Rate:  [] 92  Resp:  [16-22] 16  BP: ()/(55-83) 113/73  SpO2:  [87 %-100 %] 92 %  I/O last 3 completed shifts:  In: 6553 [P.O.:2660; I.V.:2893; IV Piggyback:1000]  Out: 5650 [Urine:5650]    Peripheral IV 03/30/20 Right (Active)   Site Assessment WDL 04/01/20 0736   Line Status Saline locked 04/01/20 0736   Phlebitis Scale 0-->no symptoms 04/01/20 0736   Infiltration Scale 0 04/01/20 0736   Infiltration Site Treatment Method  None 04/01/20 0736   Extravasation? No 04/01/20 0736   Number of days: 2     Line/device assessment(s) completed for medical necessity    Constitutional: Awake,alert, no acute distress. Ill appearing.   Respiratory: Course crackles and rhonchi throughout with prominent upper airway wheezes.   Cardiovascular: HRR, no murmurs, rubs,thrills.   GI: Soft,nontender, bowel sounds are hyperactive.   Skin/Integumen: Pale, no unusual " bruising, rashes or open areas.         Medications       cefTRIAXone  1 g Intravenous Q24H     doxycycline  intermittent infusion  100 mg Intravenous BID     DULoxetine  30 mg Oral Daily     enoxaparin ANTICOAGULANT  40 mg Subcutaneous Daily     lactobacillus rhamnosus (GG)  1 capsule Oral BID     sodium chloride (PF)  3 mL Intravenous Q8H     topiramate  100 mg Oral BID       Data   Recent Labs   Lab 04/01/20  0648 04/01/20  0450 03/31/20  0454   WBC 5.2 Canceled, Test credited 5.7   HGB 11.2* Canceled, Test credited 13.3   MCV 88 Canceled, Test credited 91    Canceled, Test credited 168    Canceled, Test credited 137   POTASSIUM 3.0* Canceled, Test credited 3.6   CHLORIDE 109 Canceled, Test credited 107   CO2 20 Canceled, Test credited 21   BUN 3* Canceled, Test credited 4*   CR 0.52 Canceled, Test credited 0.61   ANIONGAP 10 Not Calculated 9   JUAN 8.4* Canceled, Test credited 8.8   GLC 97 Canceled, Test credited 85   ALBUMIN 2.2* Canceled, Test credited  --    PROTTOTAL 5.7* Canceled, Test credited  --    BILITOTAL 0.7 Canceled, Test credited  --    ALKPHOS 141 Canceled, Test credited  --    * Canceled, Test credited  --    * Canceled, Test credited  --        No results found for this or any previous visit (from the past 24 hour(s)).

## 2020-04-01 NOTE — PLAN OF CARE
Face to face report given with opportunity to observe patient.    Report given to Nimo Sosa RN   4/1/2020  7:11 AM

## 2020-04-01 NOTE — PROGRESS NOTES
MDI instruct done, spacer given.  Flutter instruct done.  Nurse notified to have patient use at least 4 times a day and to remind her.

## 2020-04-01 NOTE — PHARMACY
[facility]    Pharmacy      Antimicrobial Stewardship Note     Current antimicrobial therapy:  Anti-infectives (From now, onward)    Start     Dose/Rate Route Frequency Ordered Stop    03/29/20 2100  cefTRIAXone in d5w (ROCEPHIN) intermittent infusion 1 g      1 g  over 30 Minutes Intravenous EVERY 24 HOURS 03/29/20 0206      03/29/20 1100  doxycycline (VIBRAMYCIN) 100 mg in D5W 250 mL intermittent infusion      100 mg  125-250 mL/hr over 1-2 Hours Intravenous 2 TIMES DAILY 03/29/20 0940            Indication: CAP    Days of Therapy: 5     Pertinent labs:  Creatinine   Creatinine   Date Value Ref Range Status   04/01/2020 0.52 0.52 - 1.04 mg/dL Final   04/01/2020 Canceled, Test credited 0.52 - 1.04 mg/dL Corrected     Comment:     INCORRECT PATIENT  CORRECTED ON 04/01 AT 0753: PREVIOUSLY REPORTED AS 0.47     03/31/2020 0.61 0.52 - 1.04 mg/dL Final     WBC   WBC   Date Value Ref Range Status   04/01/2020 5.2 4.0 - 11.0 10e9/L Final   04/01/2020 Canceled, Test credited 4.0 - 11.0 10e9/L Corrected     Comment:     INCORRECT PATIENT  CORRECTED ON 04/01 AT 0756: PREVIOUSLY REPORTED AS 6.5     03/31/2020 5.7 4.0 - 11.0 10e9/L Final     Procalcitonin   Procalcitonin   Date Value Ref Range Status   04/01/2020 0.32 ng/ml Final     Comment:     0.25-0.49 ng/ml  Possible early systemic infection or localized infection.     Recommendation: Encourage antibiotics only in the correct clinical context.   Consider obtaining blood cultures or other relevant cultures. Recheck PCT in   6-12 hours to ensure baseline low level. If repeat PCT is rising, consider   early systemic infection and consider starting antibiotics.     04/01/2020 Canceled, Test credited ng/ml Corrected     Comment:     INCORRECT PATIENT  CORRECTED ON 04/01 AT 0757: PREVIOUSLY REPORTED AS <0.05 <0.05 ng/ml  Normal    Recommendation: Very low risk of bacterial infection. Discourage antibiotics   unless strong clinical suspicion for serious infection.      03/31/2020 0.33 ng/ml Final     Comment:     0.25-0.49 ng/ml  Possible early systemic infection or localized infection.     Recommendation: Encourage antibiotics only in the correct clinical context.   Consider obtaining blood cultures or other relevant cultures. Recheck PCT in   6-12 hours to ensure baseline low level. If repeat PCT is rising, consider   early systemic infection and consider starting antibiotics.       CRP   CRP Inflammation   Date Value Ref Range Status   04/01/2020 242.0 (H) 0.0 - 8.0 mg/L Final   03/28/2020 190.0 (H) 0.0 - 8.0 mg/L Final       Culture Results: No growth (blood), mixed nam (urine)     Recommendations/Interventions:  1. none      Arin Sawyer RPH  April 1, 2020

## 2020-04-02 LAB
ALBUMIN SERPL-MCNC: 2.2 G/DL (ref 3.4–5)
ALP SERPL-CCNC: 178 U/L (ref 40–150)
ALT SERPL W P-5'-P-CCNC: 179 U/L (ref 0–50)
ANION GAP SERPL CALCULATED.3IONS-SCNC: 7 MMOL/L (ref 3–14)
AST SERPL W P-5'-P-CCNC: 175 U/L (ref 0–45)
BILIRUB SERPL-MCNC: 0.5 MG/DL (ref 0.2–1.3)
BUN SERPL-MCNC: 4 MG/DL (ref 7–30)
C PNEUM DNA SPEC QL NAA+PROBE: NOT DETECTED
CALCIUM SERPL-MCNC: 8.3 MG/DL (ref 8.5–10.1)
CHLORIDE SERPL-SCNC: 109 MMOL/L (ref 94–109)
CO2 SERPL-SCNC: 21 MMOL/L (ref 20–32)
CREAT SERPL-MCNC: 0.52 MG/DL (ref 0.52–1.04)
CRP SERPL-MCNC: 225 MG/L (ref 0–8)
ERYTHROCYTE [DISTWIDTH] IN BLOOD BY AUTOMATED COUNT: 12.4 % (ref 10–15)
FLUAV H1 2009 PAND RNA SPEC QL NAA+PROBE: NOT DETECTED
FLUAV H1 RNA SPEC QL NAA+PROBE: NOT DETECTED
FLUAV H3 RNA SPEC QL NAA+PROBE: NOT DETECTED
FLUAV RNA SPEC QL NAA+PROBE: NOT DETECTED
FLUBV RNA SPEC QL NAA+PROBE: NOT DETECTED
GFR SERPL CREATININE-BSD FRML MDRD: >90 ML/MIN/{1.73_M2}
GLUCOSE SERPL-MCNC: 107 MG/DL (ref 70–99)
HADV DNA SPEC QL NAA+PROBE: NOT DETECTED
HCOV PNL SPEC NAA+PROBE: NOT DETECTED
HCT VFR BLD AUTO: 30.1 % (ref 35–47)
HGB BLD-MCNC: 10.3 G/DL (ref 11.7–15.7)
HMPV RNA SPEC QL NAA+PROBE: NOT DETECTED
HPIV1 RNA SPEC QL NAA+PROBE: NOT DETECTED
HPIV2 RNA SPEC QL NAA+PROBE: NOT DETECTED
HPIV3 RNA SPEC QL NAA+PROBE: NOT DETECTED
HPIV4 RNA SPEC QL NAA+PROBE: NOT DETECTED
M PNEUMO DNA SPEC QL NAA+PROBE: NOT DETECTED
MCH RBC QN AUTO: 30.4 PG (ref 26.5–33)
MCHC RBC AUTO-ENTMCNC: 34.2 G/DL (ref 31.5–36.5)
MCV RBC AUTO: 89 FL (ref 78–100)
MICROBIOLOGIST REVIEW: NORMAL
PLATELET # BLD AUTO: 199 10E9/L (ref 150–450)
POTASSIUM SERPL-SCNC: 3.1 MMOL/L (ref 3.4–5.3)
PROT SERPL-MCNC: 5.9 G/DL (ref 6.8–8.8)
RBC # BLD AUTO: 3.39 10E12/L (ref 3.8–5.2)
RSV RNA SPEC QL NAA+PROBE: NOT DETECTED
RSV RNA SPEC QL NAA+PROBE: NOT DETECTED
RV+EV RNA SPEC QL NAA+PROBE: NOT DETECTED
SARS-COV-2 RNA SPEC QL NAA+PROBE: NOT DETECTED
SODIUM SERPL-SCNC: 137 MMOL/L (ref 133–144)
SPECIMEN SOURCE: NORMAL
WBC # BLD AUTO: 5.4 10E9/L (ref 4–11)

## 2020-04-02 PROCEDURE — 25000132 ZZH RX MED GY IP 250 OP 250 PS 637: Performed by: INTERNAL MEDICINE

## 2020-04-02 PROCEDURE — 25000128 H RX IP 250 OP 636: Performed by: INTERNAL MEDICINE

## 2020-04-02 PROCEDURE — 25800030 ZZH RX IP 258 OP 636: Performed by: INTERNAL MEDICINE

## 2020-04-02 PROCEDURE — 12000000 ZZH R&B MED SURG/OB

## 2020-04-02 PROCEDURE — 99232 SBSQ HOSP IP/OBS MODERATE 35: CPT | Performed by: NURSE PRACTITIONER

## 2020-04-02 PROCEDURE — 86140 C-REACTIVE PROTEIN: CPT | Performed by: NURSE PRACTITIONER

## 2020-04-02 PROCEDURE — 25000125 ZZHC RX 250: Performed by: INTERNAL MEDICINE

## 2020-04-02 PROCEDURE — 85027 COMPLETE CBC AUTOMATED: CPT | Performed by: NURSE PRACTITIONER

## 2020-04-02 PROCEDURE — 36415 COLL VENOUS BLD VENIPUNCTURE: CPT | Performed by: NURSE PRACTITIONER

## 2020-04-02 PROCEDURE — 25000132 ZZH RX MED GY IP 250 OP 250 PS 637

## 2020-04-02 PROCEDURE — 25000132 ZZH RX MED GY IP 250 OP 250 PS 637: Performed by: NURSE PRACTITIONER

## 2020-04-02 PROCEDURE — 80053 COMPREHEN METABOLIC PANEL: CPT | Performed by: NURSE PRACTITIONER

## 2020-04-02 RX ORDER — POTASSIUM CHLORIDE 750 MG/1
40 CAPSULE, EXTENDED RELEASE ORAL 2 TIMES DAILY
Status: COMPLETED | OUTPATIENT
Start: 2020-04-02 | End: 2020-04-02

## 2020-04-02 RX ORDER — GUAIFENESIN/DEXTROMETHORPHAN 100-10MG/5
SYRUP ORAL
Status: COMPLETED
Start: 2020-04-02 | End: 2020-04-02

## 2020-04-02 RX ADMIN — Medication 1 CAPSULE: at 20:56

## 2020-04-02 RX ADMIN — Medication 1 CAPSULE: at 08:32

## 2020-04-02 RX ADMIN — DOXYCYCLINE 100 MG: 100 INJECTION, POWDER, LYOPHILIZED, FOR SOLUTION INTRAVENOUS at 08:35

## 2020-04-02 RX ADMIN — DOXYCYCLINE 100 MG: 100 INJECTION, POWDER, LYOPHILIZED, FOR SOLUTION INTRAVENOUS at 20:57

## 2020-04-02 RX ADMIN — TRAMADOL HYDROCHLORIDE 50 MG: 50 TABLET ORAL at 10:30

## 2020-04-02 RX ADMIN — ZOLPIDEM TARTRATE 10 MG: 5 TABLET, FILM COATED ORAL at 21:00

## 2020-04-02 RX ADMIN — GUAIFENESIN AND DEXTROMETHORPHAN 15 ML: 100; 10 SYRUP ORAL at 03:10

## 2020-04-02 RX ADMIN — TOPIRAMATE 100 MG: 100 TABLET, FILM COATED ORAL at 20:56

## 2020-04-02 RX ADMIN — GUAIFENESIN AND DEXTROMETHORPHAN 10 ML: 100; 10 SYRUP ORAL at 20:56

## 2020-04-02 RX ADMIN — TRAMADOL HYDROCHLORIDE 50 MG: 50 TABLET ORAL at 16:13

## 2020-04-02 RX ADMIN — POTASSIUM CHLORIDE 40 MEQ: 750 CAPSULE, EXTENDED RELEASE ORAL at 09:58

## 2020-04-02 RX ADMIN — TOPIRAMATE 100 MG: 100 TABLET, FILM COATED ORAL at 08:31

## 2020-04-02 RX ADMIN — GUAIFENESIN AND DEXTROMETHORPHAN 15 ML: 100; 10 SYRUP ORAL at 14:17

## 2020-04-02 RX ADMIN — ENOXAPARIN SODIUM 40 MG: 40 INJECTION SUBCUTANEOUS at 08:34

## 2020-04-02 RX ADMIN — CEFTRIAXONE SODIUM 1 G: 1 INJECTION, SOLUTION INTRAVENOUS at 20:01

## 2020-04-02 RX ADMIN — POTASSIUM CHLORIDE 40 MEQ: 750 CAPSULE, EXTENDED RELEASE ORAL at 13:39

## 2020-04-02 ASSESSMENT — ACTIVITIES OF DAILY LIVING (ADL)
ADLS_ACUITY_SCORE: 13
ADLS_ACUITY_SCORE: 12
ADLS_ACUITY_SCORE: 13
ADLS_ACUITY_SCORE: 12
ADLS_ACUITY_SCORE: 13
ADLS_ACUITY_SCORE: 12

## 2020-04-02 NOTE — PHARMACY
[facility]    Pharmacy      Antimicrobial Stewardship Note     Current antimicrobial therapy:  Anti-infectives (From now, onward)    Start     Dose/Rate Route Frequency Ordered Stop    03/29/20 2100  cefTRIAXone in d5w (ROCEPHIN) intermittent infusion 1 g      1 g  over 30 Minutes Intravenous EVERY 24 HOURS 03/29/20 0206      03/29/20 1100  doxycycline (VIBRAMYCIN) 100 mg in D5W 250 mL intermittent infusion      100 mg  125-250 mL/hr over 1-2 Hours Intravenous 2 TIMES DAILY 03/29/20 0940            Indication: CAP    Days of Therapy: 6     Pertinent labs:  Creatinine   Creatinine   Date Value Ref Range Status   04/02/2020 0.52 0.52 - 1.04 mg/dL Final   04/01/2020 0.52 0.52 - 1.04 mg/dL Final   04/01/2020 Canceled, Test credited 0.52 - 1.04 mg/dL Corrected     Comment:     INCORRECT PATIENT  CORRECTED ON 04/01 AT 0753: PREVIOUSLY REPORTED AS 0.47       WBC   WBC   Date Value Ref Range Status   04/02/2020 5.4 4.0 - 11.0 10e9/L Final   04/01/2020 5.2 4.0 - 11.0 10e9/L Final   04/01/2020 Canceled, Test credited 4.0 - 11.0 10e9/L Corrected     Comment:     INCORRECT PATIENT  CORRECTED ON 04/01 AT 0756: PREVIOUSLY REPORTED AS 6.5       Procalcitonin   Procalcitonin   Date Value Ref Range Status   04/01/2020 0.32 ng/ml Final     Comment:     0.25-0.49 ng/ml  Possible early systemic infection or localized infection.     Recommendation: Encourage antibiotics only in the correct clinical context.   Consider obtaining blood cultures or other relevant cultures. Recheck PCT in   6-12 hours to ensure baseline low level. If repeat PCT is rising, consider   early systemic infection and consider starting antibiotics.     04/01/2020 Canceled, Test credited ng/ml Corrected     Comment:     INCORRECT PATIENT  CORRECTED ON 04/01 AT 0757: PREVIOUSLY REPORTED AS <0.05 <0.05 ng/ml  Normal    Recommendation: Very low risk of bacterial infection. Discourage antibiotics   unless strong clinical suspicion for serious infection.      03/31/2020 0.33 ng/ml Final     Comment:     0.25-0.49 ng/ml  Possible early systemic infection or localized infection.     Recommendation: Encourage antibiotics only in the correct clinical context.   Consider obtaining blood cultures or other relevant cultures. Recheck PCT in   6-12 hours to ensure baseline low level. If repeat PCT is rising, consider   early systemic infection and consider starting antibiotics.       CRP   CRP Inflammation   Date Value Ref Range Status   04/02/2020 225.0 (H) 0.0 - 8.0 mg/L Final   04/01/2020 242.0 (H) 0.0 - 8.0 mg/L Final   03/28/2020 190.0 (H) 0.0 - 8.0 mg/L Final       Culture Results:        Recommendations/Interventions:  1. None    Arin Sawyer RPH  April 2, 2020

## 2020-04-02 NOTE — PLAN OF CARE
Patient A&Ox4. Initially in the AM, low grade fever 99.7 F, respiratory rate 24, and occasional tachycardia. Patient initially maintaining O2 sats from 88% to low 90s on room air. During the afternoon patient afebrile and maintaining O2 in low to mid 90s on room air. C/o occasional midsternal pain and right sided lower rib cage pain with coughing. Pain managed with Ultram 1x and Robitussin 15 mL 1x. Course crackles heard throughout lung fields, ex AKIRA which is clear. Patient reports feeling better today and was able to complete hygiene cares independently at sink. Tele is ST 90s-100s per ICU RN.    Face to face given to: TITA Ramsey; TITA Suero RN

## 2020-04-02 NOTE — PLAN OF CARE
Face to face report given with opportunity to observe patient.    Report given to Nimo Sosa RN   4/2/2020  7:12 AM

## 2020-04-02 NOTE — PROGRESS NOTES
"CLINICAL NUTRITION SERVICES  -  ASSESSMENT NOTE    Dilcia JUDY Santillan : LOS    49 yof admitted for CAP. Pt has a hx of migraines. Appetite has mostly been fair to poor this admission, but intake appears to be adequate. No significant weight loss noted.     Diet Order: Regular  Intake: %     Height: 5' 7\"  Weight: 179 lbs 3.74 oz  Body mass index is 28.07 kg/m .  Weight Status:  Overweight BMI 25-29.9  IBW: 61.6kg  Weight History:   Wt Readings from Last 10 Encounters:   03/29/20  03/18/20  10/10/19  07/16/19 81.3 kg (179 lb 3.7 oz)  81.4 kg (179 lb 7.3 oz)*  78.4 kg (172 lb 13.5 oz)*  79.4 kg (175 lb 0.7 oz)*   03/07/19 77.1 kg (170 lb)   10/26/17 63.5 kg (140 lb)   * Weight from care everywhere    Weight used to estimate nutrition needs - 81.3kg  Estimated Energy Needs: [id number] kcals (25-30 Kcal/Kg)   Estimated Protein Needs: 65-80 grams protein (0.8-1 g pro/Kg)    Malnutrition Diagnosis: Patient does not meet two of the criteria necessary for diagnosing malnutrition    NUTRITION RECOMMENDATIONS  - Encourage intake as needed  - Nutrition supplements may be indicated    MONITORING AND EVALUATION:  RD will monitor intake, weight, labs          "

## 2020-04-02 NOTE — PROVIDER NOTIFICATION
Notified Lauren Schultz NP that Sepsis BPA fired. Lauren gave order to not order lactic acid draw.  Cathi Mccain RN

## 2020-04-02 NOTE — PROGRESS NOTES
[facility]    Hospitalist Progress Note    Date of Service (when I saw the patient): 04/02/2020    Assessment & Plan      Acute respiratory failure: Mild with hypoxia. She has been off oxygen for several days but did have some minimal hypoxia overnight 4/1 that required 1 liter of oxygen.   -4/2:Again on 1 liter oxygen overnight but sats >92% while awake and actually improve with ambulation so this is likely due to shallow breathing rather than poor exchange.     Pneumonia: Bacterial versus viral. On initial CT scan of chest she had prominent RLL infiltrate with fever, cough, possible early sepsis and dehydration. She was started on Rocephin and Doxycyclcine on arrival. Unfortunately she continues to run high fevers though these have become less frequent. She continues to have a dry hacking cough, needed 1 liter of oxygen during the night but titrated off this morning. Her procalcitonin is negligible and WBC has been normal for several days and she has persistent elevated LFT's as well. This is all highly suspicious of viral origin. She was tested for COVID-19 on arrival in the ED. However since symptoms continue to match closely with expected COVID symptoms will retest her and reinstitute droplet/contact precautions.    -4/2: Respiratory viral panel negative, COVID-19 still pending. No fever over 100.4 x24 hours now. CRP trending down today. Continue albuterol MDI, flutter and increased activity.     Possible early Sepsis: Meets most sensitive criteria on presentation.  Perhaps a borderline metabolic encephalopathy suggesting end organ dysfunction though she did not have any other indicators of end organ dysfunction. She did have persistent tachycardia which has now resolved except during times of fever.      Metabolic encephalopathy:  Possibly due to encephalopathy based on acute illness. No focal findings. She is awake and alert today, though continues to spend most of her time sleeping throughout the  "day and night. She also has known mild cognitive impairment which may be exacerbated in acute illness.       Volume depletion: Borderline volume depletion on arrival.  Likely resulting from poor oral intake and insensible loss.  She has received large volume of fluids over the course of her stay. Voiding up to 2 liters per shift and taking in oral adequately. Saline lock.      Transaminitis: Could consider \"atypical\" bacterial infection such as Legionella-this presumably covered by doxycycline as avoiding use of azithromycin.       Hyperglycemia of acute illness: Mild hyperglycemia due to acute onset illness on arrival, now resolved.        Hypokalemia: Due to poor oral food intake but high free water intake and increased urinary excretions from large volume IVF. Oral replacement today.        DVT Prophylaxis: Enoxaparin (Lovenox) SQ  Code Status: Full Code    Disposition: Expected discharge in 1-2 days once afebrile x48 hours, hypoxia resolved.    Lauren Schultz,CNP    Interval History   Didn't sleep well last night, denies dyspnea at rest. Still no appetite, drinking well.  Still has a dry cough. Denies nausea, chest pain, myalgias.     -Data reviewed today: I reviewed all new labs and imaging results over the last 24 hours.    Physical Exam   Temp: 99.7  F (37.6  C) Temp src: Tympanic BP: 105/77 Pulse: 104 Heart Rate: 109 Resp: 24 SpO2: 94 % O2 Device: None (Room air) Oxygen Delivery: 1 LPM  Vitals:    03/29/20 0223   Weight: 81.3 kg (179 lb 3.7 oz)     Vital Signs with Ranges  Temp:  [98.3  F (36.8  C)-100.4  F (38  C)] 99.7  F (37.6  C)  Pulse:  [104-117] 104  Heart Rate:  [106-109] 109  Resp:  [20-26] 24  BP: (105-186)/(68-91) 105/77  SpO2:  [88 %-95 %] 94 %  I/O last 3 completed shifts:  In: 2411 [P.O.:2080; I.V.:331]  Out: 4000 [Urine:4000]    Peripheral IV 04/01/20 Left Upper forearm (Active)   Site Assessment WDL 04/02/20 0840   Line Status Infusing 04/02/20 0843   Phlebitis Scale 0-->no symptoms " 04/02/20 0843   Infiltration Scale 0 04/02/20 0843   Infiltration Site Treatment Method  None 04/01/20 2028   Extravasation? No 04/02/20 0843   Dressing Intervention New dressing  04/01/20 1200   Number of days: 1     Line/device assessment(s) completed for medical necessity    Constitutional: Awake,alert, no acute distress. Ill appearing.   Respiratory: Vast improvement in lung sounds today, wheezing has resolved, crackles remain in the right base but otherwise lungs are clear without rhonchi.   Cardiovascular: HRR, no murmurs, rubs,thrills.   GI: Soft,nontender, bowel sounds are hyperactive.   Skin/Integumen: Pale, no unusual bruising, rashes or open areas.         Medications       cefTRIAXone  1 g Intravenous Q24H     doxycycline  intermittent infusion  100 mg Intravenous BID     DULoxetine  30 mg Oral Daily     enoxaparin ANTICOAGULANT  40 mg Subcutaneous Daily     lactobacillus rhamnosus (GG)  1 capsule Oral BID     sodium chloride (PF)  3 mL Intravenous Q8H     topiramate  100 mg Oral BID       Data   Recent Labs   Lab 04/02/20  0454 04/01/20  0648 04/01/20  0450   WBC 5.4 5.2 Canceled, Test credited   HGB 10.3* 11.2* Canceled, Test credited   MCV 89 88 Canceled, Test credited    164 Canceled, Test credited    139 Canceled, Test credited   POTASSIUM 3.1* 3.0* Canceled, Test credited   CHLORIDE 109 109 Canceled, Test credited   CO2 21 20 Canceled, Test credited   BUN 4* 3* Canceled, Test credited   CR 0.52 0.52 Canceled, Test credited   ANIONGAP 7 10 Not Calculated   JUAN 8.3* 8.4* Canceled, Test credited   * 97 Canceled, Test credited   ALBUMIN 2.2* 2.2* Canceled, Test credited   PROTTOTAL 5.9* 5.7* Canceled, Test credited   BILITOTAL 0.5 0.7 Canceled, Test credited   ALKPHOS 178* 141 Canceled, Test credited   * 138* Canceled, Test credited   * 141* Canceled, Test credited       No results found for this or any previous visit (from the past 24 hour(s)).

## 2020-04-02 NOTE — PLAN OF CARE
"Pt alert and orientated x4. Steady on feet. Up in room ind. T100.1. O2 sats >90%. Voiding adequately. Skin warm except for hands and feet, cap refill >3 sec. Up in chair this evening. Drinking adequate fluids. IV lock. Pt has dry hoarse cough. Lung sounds coarse and then encouraged pt to take deep breaths lungs then became diminished. Medicated with Tylenol 975mg and Jeff 15cc at 2011 per request for temperature/cough management. Flexeril 10mg at 2057 for c/o  chest \"all over\" discomfort from coughing frequently.   Placed O2 at 1lpm via Nasal cannula as O2 sats 88%. Pt remains tachynpiec.    Face to face report given with opportunity to observe patient.    Report given to Taylor Patton RN   4/1/2020  10:54 PM    "

## 2020-04-02 NOTE — PLAN OF CARE
Patient is alert and oriented  x 4. Patient is steady while ambulating, up independently. Voiding adequate amounts. IV is sL between abx. Pt remains on 1 L NC, maintaining o2>90%. LS are coarse. Robitussin given for dry, non-productive cough. Pt is SR/ST 90's-100's at rest, 110-130's with activity. Tmax 100.0. Pt sleeping between cares.

## 2020-04-03 VITALS
HEART RATE: 95 BPM | HEIGHT: 67 IN | RESPIRATION RATE: 16 BRPM | BODY MASS INDEX: 28.13 KG/M2 | OXYGEN SATURATION: 95 % | TEMPERATURE: 97.8 F | WEIGHT: 179.23 LBS | SYSTOLIC BLOOD PRESSURE: 97 MMHG | DIASTOLIC BLOOD PRESSURE: 64 MMHG

## 2020-04-03 LAB
ALBUMIN SERPL-MCNC: 2.3 G/DL (ref 3.4–5)
ALP SERPL-CCNC: 181 U/L (ref 40–150)
ALT SERPL W P-5'-P-CCNC: 166 U/L (ref 0–50)
ANION GAP SERPL CALCULATED.3IONS-SCNC: 7 MMOL/L (ref 3–14)
AST SERPL W P-5'-P-CCNC: 113 U/L (ref 0–45)
BACTERIA SPEC CULT: NORMAL
BACTERIA SPEC CULT: NORMAL
BILIRUB SERPL-MCNC: 0.4 MG/DL (ref 0.2–1.3)
BUN SERPL-MCNC: 5 MG/DL (ref 7–30)
CALCIUM SERPL-MCNC: 8.6 MG/DL (ref 8.5–10.1)
CHLORIDE SERPL-SCNC: 110 MMOL/L (ref 94–109)
CO2 SERPL-SCNC: 21 MMOL/L (ref 20–32)
CREAT SERPL-MCNC: 0.54 MG/DL (ref 0.52–1.04)
CRP SERPL-MCNC: 124 MG/L (ref 0–8)
ERYTHROCYTE [DISTWIDTH] IN BLOOD BY AUTOMATED COUNT: 12.6 % (ref 10–15)
GFR SERPL CREATININE-BSD FRML MDRD: >90 ML/MIN/{1.73_M2}
GLUCOSE SERPL-MCNC: 102 MG/DL (ref 70–99)
HCT VFR BLD AUTO: 32.2 % (ref 35–47)
HGB BLD-MCNC: 10.8 G/DL (ref 11.7–15.7)
MCH RBC QN AUTO: 30.7 PG (ref 26.5–33)
MCHC RBC AUTO-ENTMCNC: 33.5 G/DL (ref 31.5–36.5)
MCV RBC AUTO: 92 FL (ref 78–100)
PLATELET # BLD AUTO: 230 10E9/L (ref 150–450)
POTASSIUM SERPL-SCNC: 3.4 MMOL/L (ref 3.4–5.3)
PROCALCITONIN SERPL-MCNC: 0.1 NG/ML
PROT SERPL-MCNC: 6.1 G/DL (ref 6.8–8.8)
RBC # BLD AUTO: 3.52 10E12/L (ref 3.8–5.2)
SODIUM SERPL-SCNC: 138 MMOL/L (ref 133–144)
SPECIMEN SOURCE: NORMAL
SPECIMEN SOURCE: NORMAL
WBC # BLD AUTO: 5.7 10E9/L (ref 4–11)

## 2020-04-03 PROCEDURE — 36415 COLL VENOUS BLD VENIPUNCTURE: CPT | Performed by: NURSE PRACTITIONER

## 2020-04-03 PROCEDURE — 85027 COMPLETE CBC AUTOMATED: CPT | Performed by: NURSE PRACTITIONER

## 2020-04-03 PROCEDURE — 25000132 ZZH RX MED GY IP 250 OP 250 PS 637: Performed by: INTERNAL MEDICINE

## 2020-04-03 PROCEDURE — 99238 HOSP IP/OBS DSCHRG MGMT 30/<: CPT | Performed by: INTERNAL MEDICINE

## 2020-04-03 PROCEDURE — 84145 PROCALCITONIN (PCT): CPT | Performed by: NURSE PRACTITIONER

## 2020-04-03 PROCEDURE — 25800030 ZZH RX IP 258 OP 636: Performed by: INTERNAL MEDICINE

## 2020-04-03 PROCEDURE — 25000128 H RX IP 250 OP 636: Performed by: INTERNAL MEDICINE

## 2020-04-03 PROCEDURE — 40000275 ZZH STATISTIC RCP TIME EA 10 MIN

## 2020-04-03 PROCEDURE — 80053 COMPREHEN METABOLIC PANEL: CPT | Performed by: NURSE PRACTITIONER

## 2020-04-03 PROCEDURE — 86140 C-REACTIVE PROTEIN: CPT | Performed by: NURSE PRACTITIONER

## 2020-04-03 PROCEDURE — 94799 UNLISTED PULMONARY SVC/PX: CPT

## 2020-04-03 PROCEDURE — 25000125 ZZHC RX 250: Performed by: INTERNAL MEDICINE

## 2020-04-03 RX ORDER — DOXYCYCLINE HYCLATE 100 MG
100 TABLET ORAL 2 TIMES DAILY
Qty: 6 TABLET | Refills: 0 | Status: ON HOLD | OUTPATIENT
Start: 2020-04-03 | End: 2024-07-29

## 2020-04-03 RX ORDER — CYCLOBENZAPRINE HCL 10 MG
5-10 TABLET ORAL 3 TIMES DAILY PRN
Qty: 30 TABLET | Refills: 1 | Status: SHIPPED | OUTPATIENT
Start: 2020-04-03

## 2020-04-03 RX ORDER — GUAIFENESIN/DEXTROMETHORPHAN 100-10MG/5
10-15 SYRUP ORAL 3 TIMES DAILY PRN
Qty: 1 BOTTLE | Refills: 0 | Status: SHIPPED | OUTPATIENT
Start: 2020-04-03 | End: 2023-12-04

## 2020-04-03 RX ADMIN — DOXYCYCLINE 100 MG: 100 INJECTION, POWDER, LYOPHILIZED, FOR SOLUTION INTRAVENOUS at 08:22

## 2020-04-03 RX ADMIN — ENOXAPARIN SODIUM 40 MG: 40 INJECTION SUBCUTANEOUS at 08:14

## 2020-04-03 RX ADMIN — GUAIFENESIN AND DEXTROMETHORPHAN 10 ML: 100; 10 SYRUP ORAL at 05:10

## 2020-04-03 RX ADMIN — Medication 1 CAPSULE: at 08:12

## 2020-04-03 RX ADMIN — ACETAMINOPHEN 650 MG: 325 TABLET, FILM COATED ORAL at 05:10

## 2020-04-03 RX ADMIN — TRAMADOL HYDROCHLORIDE 50 MG: 50 TABLET ORAL at 00:00

## 2020-04-03 RX ADMIN — TOPIRAMATE 100 MG: 100 TABLET, FILM COATED ORAL at 08:12

## 2020-04-03 ASSESSMENT — ACTIVITIES OF DAILY LIVING (ADL)
ADLS_ACUITY_SCORE: 12

## 2020-04-03 NOTE — PLAN OF CARE
Ressumed cares at 130 AM. Here for CAP. Pt is A&O w/ soft BP of 102/60. Remains afebrile. Tele in place reading SR 90s. Satting 97% on RA w/ no reports of SOB or chest pain. Presence of a frequent dry, productive cough. No SOB reported. LSs are coarse to BLLs. Robitussin administered x1. Sputum cup remains in room to collect sample. Rates pain at a 7/10 to abd & ribcage along w/ headache. Tylenol administered x1. Radial & pedal pulses remain weak. Remains on a regular diet. Saline locked. Receiving rocephin & vibramycin for abxs. Bed in lowest position. Call light in reach. ID band in place. Makes needs known. Slept well throughout the night.    Face to face report given with opportunity to observe patient.    Report given to Cathi & Diamond Sawyer   4/3/2020  7:10 AM

## 2020-04-03 NOTE — PLAN OF CARE
Pt A&O x3. Reports generalized pain in chest, ribs and back: received ultram with some relief. Afebrile. Call light within reach and pt calls appropriately.     Face to face report given with opportunity to observe patient.    Report given to TITA Leigh RN   4/3/2020  1:20 AM

## 2020-04-03 NOTE — PLAN OF CARE
9920- Phone call rec'd from Agnes at the [facility] with Covid 19 result negative. Dr. Aguirre and charge nurse notified.

## 2020-04-03 NOTE — PROGRESS NOTES
"[facility]  Hospitalist Progress Note          Assessment and Plan:        Acute respiratory failure: Mild with hypoxia. But was on O2 overnight.     Pneumonia: Bacterial versus viral. On initial CT scan of chest she had prominent RLL infiltrate with fever, cough, possible early sepsis and dehydration. She was started on Rocephin and Doxycyclcine on arrival. Unfortunately she continues to run high fevers though these have become less frequent.    -4/2: Respiratory viral panel negative, persistent elevated LFT's as well. Tested negative for Covid x 2.    -4/2:No fever over  x24+ hours now. CRP trending down today once again. Continue albuterol MDI, flutter and increased activity.     Possible early Sepsis: Meets most sensitive criteria on presentation.  Perhaps a borderline metabolic encephalopathy suggesting end organ dysfunction though she did not have any other indicators of end organ dysfunction. She did have persistent tachycardia which has now resolved except during times of fever.      Metabolic encephalopathy:  Possibly due to encephalopathy based on acute illness. No focal findings. She is awake and alert today, though continues to spend most of her time sleeping throughout the day and night. She also has known mild cognitive impairment which may be exacerbated in acute illness.       Volume depletion: Borderline volume depletion on arrival.  Likely resulting from poor oral intake and insensible loss.  She has received large volume of fluids over the course of her stay. Voiding up to 2 liters per shift and taking in oral adequately. Saline lock.      Transaminitis: Could consider \"atypical\" bacterial infection such as Legionella-this presumably covered by doxycycline as avoiding use of azithromycin.       Hyperglycemia of acute illness: Mild hyperglycemia due to acute onset illness on arrival, now resolved.         Hypokalemia: Due to poor oral food intake but high free water intake and " increased urinary excretions from large volume IVF. Resolved        DVT Prophylaxis: Enoxaparin (Lovenox) SQ  Code Status: Full Code     Disposition: Expected discharge in 1 day once afebrile x48 hours, hypoxia resolved.            Interval History:   Doing fine.  No acute changes.  LFTs stable but remain elevated.                Medications:       cefTRIAXone  1 g Intravenous Q24H     doxycycline  intermittent infusion  100 mg Intravenous BID     DULoxetine  30 mg Oral Daily     enoxaparin ANTICOAGULANT  40 mg Subcutaneous Daily     lactobacillus rhamnosus (GG)  1 capsule Oral BID     sodium chloride (PF)  3 mL Intravenous Q8H     topiramate  100 mg Oral BID                  Physical Exam:     Vitals:    20 2054 20 2353 20 0457 20 0755   BP: 112/76 109/75 102/60 97/62   BP Location: Right arm  Right arm Right arm   Pulse:       Resp: 18 18 16 16   Temp: 98.3  F (36.8  C) 97.4  F (36.3  C) 97.7  F (36.5  C) 97.8  F (36.6  C)   TempSrc: Tympanic Tympanic Tympanic Tympanic   SpO2: 95% 96% 97% 94%   Weight:       Height:             Vital Sign Ranges  Temperature Temp  Av.4  F (36.9  C)  Min: 97.4  F (36.3  C)  Max: 99.7  F (37.6  C)   Blood pressure Systolic (24hrs), Av , Min:97 , Max:112        Diastolic (24hrs), Av, Min:60, Max:78      Pulse Pulse  Av.5  Min: 95  Max: 104   Respirations Resp  Av.3  Min: 16  Max: 24   Pulse oximetry SpO2  Av.6 %  Min: 93 %  Max: 97 %         Intake/Output Summary (Last 24 hours) at 4/3/2020 0807  Last data filed at 2020 2200  Gross per 24 hour   Intake 1320 ml   Output 3950 ml   Net -2630 ml     Constitutional: Awake,alert, no acute distress. Ill appearing.   Respiratory:     Vast improvement in lung sounds today, wheezing has resolved, crackles remain in the right base but otherwise lungs are clear without rhonchi.   Cardiovascular: HRR, no murmurs, rubs,thrills.   GI: Soft,nontender, bowel sounds are hyperactive.    Skin/Integumen: Pale, no unusual bruising, rashes or open areas.       Peripheral IV 04/02/20 Left Lower forearm (Active)   Site Assessment WDL 04/03/20 0803   Line Status Saline locked 04/03/20 0803   Phlebitis Scale 0-->no symptoms 04/03/20 0803   Infiltration Scale 0 04/03/20 0803   Extravasation? No 04/03/20 0803   Number of days: 1     No line/device             Data:     Lab Results   Component Value Date    WBC 5.7 04/03/2020    HGB 10.8 (L) 04/03/2020    HCT 32.2 (L) 04/03/2020     04/03/2020     04/03/2020    POTASSIUM 3.4 04/03/2020    CHLORIDE 110 (H) 04/03/2020    CO2 21 04/03/2020    BUN 5 (L) 04/03/2020    CR 0.54 04/03/2020     (H) 04/03/2020    DD 1.1 (H) 03/28/2020    DIMER <200 08/25/2016    TROPI  08/25/2016     <0.015  The 99th percentile for upper reference range is 0.045 ug/L.  Troponin values in   the range of 0.045 - 0.120 ug/L may be associated with risks of adverse   clinical events.       (H) 04/03/2020     (H) 04/03/2020    ALKPHOS 181 (H) 04/03/2020    BILITOTAL 0.4 04/03/2020     Lactic Acid   Date Value Ref Range Status   03/29/2020 1.7 0.7 - 2.0 mmol/L Final       Odilon Amaro, DO

## 2020-04-03 NOTE — PLAN OF CARE
Patient discharged at 3:15 PM via wheel chair accompanied by staff. Prescriptions sent to patients preferred pharmacy. All belongings sent with patient.     Discharge instructions reviewed with patient. Listed belongings gathered and returned to patient. Yes    Patient discharged to Home.   Report called to: N/A    Core Measures and Vaccines  Core Measures applicable during stay: No. If yes, state diagnosis: N/A  Pneumonia and Influenza given prior to discharge, if indicated: No. Patient refused.    Surgical Patient   Surgical Procedures during stay: N/A  Did patient receive discharge instruction on wound care and recognition of infection symptoms? N/A    MISC  Follow up appointment made:  Yes  Home and hospital aquired medications returned to patient: Yes  Patient reports pain was well managed at discharge: Yes      VSS. Maintaining O2 in mid 90s on room air. Patient has occasional dry, nonproductive cough and denies SOB. Patient A&O and ambulating independently in room. Course crackles heard throughout lung fields. Patient denies pain, 0/10. Patient verbalized feeling anxious to discharge. Patient encouraged to ask Dr. Amaro questions regarding whether she is contagious and for how long. Discharge teaching completed with patient.      Cathi Mccain RN

## 2020-04-03 NOTE — PHARMACY
[facility]    Pharmacy      Antimicrobial Stewardship Note     Current antimicrobial therapy:  Anti-infectives (From now, onward)    Start     Dose/Rate Route Frequency Ordered Stop    03/29/20 2100  cefTRIAXone in d5w (ROCEPHIN) intermittent infusion 1 g      1 g  over 30 Minutes Intravenous EVERY 24 HOURS 03/29/20 0206      03/29/20 1100  doxycycline (VIBRAMYCIN) 100 mg in D5W 250 mL intermittent infusion      100 mg  125-250 mL/hr over 1-2 Hours Intravenous 2 TIMES DAILY 03/29/20 0940            Indication: CAP    Days of Therapy: 7 (Rocephin), 6 (Doxycycline)     Pertinent labs:  Creatinine   Creatinine   Date Value Ref Range Status   04/03/2020 0.54 0.52 - 1.04 mg/dL Final   04/02/2020 0.52 0.52 - 1.04 mg/dL Final   04/01/2020 0.52 0.52 - 1.04 mg/dL Final     WBC   WBC   Date Value Ref Range Status   04/03/2020 5.7 4.0 - 11.0 10e9/L Final   04/02/2020 5.4 4.0 - 11.0 10e9/L Final   04/01/2020 5.2 4.0 - 11.0 10e9/L Final     Procalcitonin   Procalcitonin   Date Value Ref Range Status   04/03/2020 0.10 ng/ml Final     Comment:     0.05-0.24 ng/ml Low risk of systemic bacterial infection. Local bacterial   infection possible.  Recommendation: Assess other clinical features of   infection. Discourage antibiotics unless strong clinical suspicion for serious   infection.     04/01/2020 0.32 ng/ml Final     Comment:     0.25-0.49 ng/ml  Possible early systemic infection or localized infection.     Recommendation: Encourage antibiotics only in the correct clinical context.   Consider obtaining blood cultures or other relevant cultures. Recheck PCT in   6-12 hours to ensure baseline low level. If repeat PCT is rising, consider   early systemic infection and consider starting antibiotics.     04/01/2020 Canceled, Test credited ng/ml Corrected     Comment:     INCORRECT PATIENT  CORRECTED ON 04/01 AT 0757: PREVIOUSLY REPORTED AS <0.05 <0.05 ng/ml  Normal    Recommendation: Very low risk of bacterial infection.  Discourage antibiotics   unless strong clinical suspicion for serious infection.       CRP   CRP Inflammation   Date Value Ref Range Status   04/03/2020 124.0 (H) 0.0 - 8.0 mg/L Final   04/02/2020 225.0 (H) 0.0 - 8.0 mg/L Final   04/01/2020 242.0 (H) 0.0 - 8.0 mg/L Final       Culture Results:   (4/1/20) COVID-19 = negative  (3/28/20) Urine  (3/28/20) Blood  (3/28/20) Influenza/RSV = negative  (3/29/20) COVID-19 = negative     Recommendations/Interventions:  1. Transition to oral today    Sundeep Rosario, [facility]  April 3, 2020

## 2020-04-03 NOTE — PLAN OF CARE
Pt alert and oriented x3, pleasant and cooperative. Up independently in room. VS and assessment as charted. Afebrile and maintaining 02 sats mid 90's on RA. RR 18, even and unlabored. Rates pain to mid upper chest and bilat rib cage with coughing 7/10 and received tramadol and Robitussin with relief. Has a dry cough that pt reports is productive of green-red tinged sputum at times. Specimen cup at bedside in attempt to collect sputum sample. Urinating without difficulty. IV SL - receiving Rocephin and Doxy IV for abx coverage. Free from falls/ injury and call light within reach.     Pt transitioned to room 3210 at 2245.   Face to face report given with opportunity to observe patient.    Report given to Lizzie RN  Roxy Ryan RN   4/2/2020  11:07 PM

## 2020-04-03 NOTE — DISCHARGE SUMMARY
Admit Date:     03/28/2020   Discharge Date:           PRIMARY CARE PROVIDER:  Rubin Navarrete MD      ADMISSION DIAGNOSIS:  Cough and fever.      DISCHARGE DIAGNOSES:   1.  Right lower lobe pneumonia, community-acquired (negative for COVID-19 x2) with possible early sepsis.   2.  History of migraines.   3.  Transaminitis.   4.  Hypokalemia.   5.  Dilutional anemia.   6.  Elevated CRP.      PENDING TEST RESULTS AT TIME OF DISCHARGE:  None.      PROCEDURES/CONSULTATIONS:  Chest x-ray and CTA of the chest with contrast.      DISPOSITION:  Home, self-care.      HISTORY OF PRESENT ILLNESS:  Please see admission H and P.      PAST MEDICAL HISTORY:  Please see admission H and P.      HOSPITAL COURSE:  Ms. Santillan presented on 03/28/2020 with complaints of cough and fever.  She subsequently did undergo workup which revealed right lower lobe pneumonia.  Due to her ongoing fever and concern for COVID-19, she was tested on 2 separate occasions with both tests resulting negative.  During this hospitalization, the patient has been placed on ceftriaxone and doxycycline and she will continue on doxycycline for 3 additional days at time of discharge to complete a 10-day course.      The patient's respiratory status improved and she was weaned off oxygen completely about 48 hours ago.  In addition, she has been afebrile for approximately 24 hours.      PHYSICAL EXAMINATION AT TIME OF DISCHARGE:   VITAL SIGNS:  Blood pressure is 97/64, respirations 16, O2 sat 95% on room air, heart rate is 89, temperature 97.8.   GENERAL:  The patient is alert and oriented, pleasant, conversant, in no acute distress.   HEART:  Regular rate and rhythm, S1, S2, no murmurs, rubs or gallops.   LUNGS:  Clear to auscultation, but bit diminished in the right lower lung field.   ABDOMEN:  Obese, soft, with positive bowel sounds.   EXTREMITIES:  Without any edema.   NEUROLOGIC:  No focal neurologic deficits were noted.   INTEGUMENT:  No rashes noted.       ADDITIONAL DISCHARGE INSTRUCTIONS:     CODE STATUS:  Full.      DIET:  Regular.      ACTIVITY:  As tolerated.      DISCHARGE MEDICATIONS:  Unchanged from previous except for the addition of Robitussin and Doxycycline 100 mg p.o. b.i.d. for 3 additional days.      ADDITIONAL DISCHARGE INSTRUCTIONS:  The patient should follow up with her primary care provider next week for repeat labs due to her elevated liver function tests.         SLIME LUQUE DO             D: 2020   T: 2020   MT: MICK      Name:     WES KRISHNA   MRN:      [id number]        Account:        [id number]   :      1970           Admit Date:     2020                                  Discharge Date:       Document: [id number]       cc: Rubin Navarrete MD

## 2021-05-20 ENCOUNTER — HOSPITAL ENCOUNTER (EMERGENCY)
Facility: HOSPITAL | Age: 51
Discharge: HOME | End: 2021-05-20
Attending: EMERGENCY MEDICINE | Admitting: EMERGENCY MEDICINE

## 2021-05-20 ENCOUNTER — APPOINTMENT (OUTPATIENT)
Dept: MRI IMAGING | Facility: HOSPITAL | Age: 51
End: 2021-05-20
Attending: EMERGENCY MEDICINE

## 2021-05-20 VITALS
RESPIRATION RATE: 16 BRPM | SYSTOLIC BLOOD PRESSURE: 119 MMHG | WEIGHT: 170 LBS | BODY MASS INDEX: 26.63 KG/M2 | TEMPERATURE: 98.5 F | HEART RATE: 100 BPM | OXYGEN SATURATION: 100 % | DIASTOLIC BLOOD PRESSURE: 82 MMHG

## 2021-05-20 DIAGNOSIS — G45.9 TIA (TRANSIENT ISCHEMIC ATTACK): ICD-10-CM

## 2021-05-20 DIAGNOSIS — H53.2 DIPLOPIA: ICD-10-CM

## 2021-05-20 LAB
ANION GAP SERPL CALCULATED.3IONS-SCNC: 8 MMOL/L (ref 3–14)
BASOPHILS # BLD AUTO: 0 10E9/L (ref 0–0.2)
BASOPHILS NFR BLD AUTO: 0.6 %
BUN SERPL-MCNC: 6 MG/DL (ref 7–30)
CALCIUM SERPL-MCNC: 8.9 MG/DL (ref 8.5–10.1)
CHLORIDE SERPL-SCNC: 111 MMOL/L (ref 94–109)
CHOLEST SERPL-MCNC: 226 MG/DL
CO2 SERPL-SCNC: 22 MMOL/L (ref 20–32)
CREAT SERPL-MCNC: 0.78 MG/DL (ref 0.52–1.04)
DIFFERENTIAL METHOD BLD: NORMAL
EOSINOPHIL # BLD AUTO: 0.1 10E9/L (ref 0–0.7)
EOSINOPHIL NFR BLD AUTO: 1.8 %
ERYTHROCYTE [DISTWIDTH] IN BLOOD BY AUTOMATED COUNT: 12.3 % (ref 10–15)
GFR SERPL CREATININE-BSD FRML MDRD: 88 ML/MIN/{1.73_M2}
GLUCOSE SERPL-MCNC: 109 MG/DL (ref 70–99)
HCT VFR BLD AUTO: 43.2 % (ref 35–47)
HDLC SERPL-MCNC: 68 MG/DL
HGB BLD-MCNC: 14.5 G/DL (ref 11.7–15.7)
IMM GRANULOCYTES # BLD: 0.1 10E9/L (ref 0–0.4)
IMM GRANULOCYTES NFR BLD: 1 %
LDLC SERPL CALC-MCNC: 119 MG/DL
LYMPHOCYTES # BLD AUTO: 1.6 10E9/L (ref 0.8–5.3)
LYMPHOCYTES NFR BLD AUTO: 22.7 %
MCH RBC QN AUTO: 30.1 PG (ref 26.5–33)
MCHC RBC AUTO-ENTMCNC: 33.6 G/DL (ref 31.5–36.5)
MCV RBC AUTO: 90 FL (ref 78–100)
MONOCYTES # BLD AUTO: 0.7 10E9/L (ref 0–1.3)
MONOCYTES NFR BLD AUTO: 10.4 %
NEUTROPHILS # BLD AUTO: 4.4 10E9/L (ref 1.6–8.3)
NEUTROPHILS NFR BLD AUTO: 63.5 %
NONHDLC SERPL-MCNC: 158 MG/DL
NRBC # BLD AUTO: 0 10*3/UL
NRBC BLD AUTO-RTO: 0 /100
PLATELET # BLD AUTO: 310 10E9/L (ref 150–450)
POTASSIUM SERPL-SCNC: 3.3 MMOL/L (ref 3.4–5.3)
RBC # BLD AUTO: 4.81 10E12/L (ref 3.8–5.2)
SODIUM SERPL-SCNC: 141 MMOL/L (ref 133–144)
TRIGL SERPL-MCNC: 196 MG/DL
WBC # BLD AUTO: 6.8 10E9/L (ref 4–11)

## 2021-05-20 PROCEDURE — 70549 MR ANGIOGRAPH NECK W/O&W/DYE: CPT

## 2021-05-20 PROCEDURE — 80048 BASIC METABOLIC PNL TOTAL CA: CPT | Performed by: EMERGENCY MEDICINE

## 2021-05-20 PROCEDURE — 99285 EMERGENCY DEPT VISIT HI MDM: CPT

## 2021-05-20 PROCEDURE — 85025 COMPLETE CBC W/AUTO DIFF WBC: CPT | Performed by: EMERGENCY MEDICINE

## 2021-05-20 PROCEDURE — 70553 MRI BRAIN STEM W/O & W/DYE: CPT

## 2021-05-20 PROCEDURE — A9585 GADOBUTROL INJECTION: HCPCS | Performed by: RADIOLOGY

## 2021-05-20 PROCEDURE — 70544 MR ANGIOGRAPHY HEAD W/O DYE: CPT

## 2021-05-20 PROCEDURE — 36415 COLL VENOUS BLD VENIPUNCTURE: CPT

## 2021-05-20 PROCEDURE — 255N000002 HC RX 255 OP 636: Performed by: RADIOLOGY

## 2021-05-20 PROCEDURE — 80061 LIPID PANEL: CPT | Performed by: EMERGENCY MEDICINE

## 2021-05-20 PROCEDURE — 99285 EMERGENCY DEPT VISIT HI MDM: CPT | Performed by: EMERGENCY MEDICINE

## 2021-05-20 PROCEDURE — 99285 EMERGENCY DEPT VISIT HI MDM: CPT | Mod: 25

## 2021-05-20 RX ORDER — GADOBUTROL 604.72 MG/ML
10 INJECTION INTRAVENOUS ONCE
Status: COMPLETED | OUTPATIENT
Start: 2021-05-20 | End: 2021-05-20

## 2021-05-20 RX ORDER — ASPIRIN 81 MG/1
81 TABLET, CHEWABLE ORAL DAILY
Qty: 30 TABLET | Refills: 0 | Status: ON HOLD | OUTPATIENT
Start: 2021-05-20 | End: 2024-07-29

## 2021-05-20 RX ADMIN — GADOBUTROL 10 ML: 604.72 INJECTION INTRAVENOUS at 14:51

## 2021-05-20 NOTE — ED NOTES
patient stated that this double vision started this morning.  And is worse when she is outside when things are moving

## 2021-05-20 NOTE — ED PROVIDER NOTES
EMERGENCY DEPARTMENT ENCOUNTER      NAME: Dilica Santillan  AGE: 51 year old female  YOB: 1970  MRN: [id number]  EVALUATION DATE & TIME: 5/20/2021 12:38 PM    PCP: Rubin Navarrete    ED PROVIDER: Sam Fermin D.O.      Chief Complaint   Patient presents with     Eye Problem         HPI    Patient information was obtained from: patient      Dilcia Santillan is a 51 year old female with no medical problems and history of sinus surgery does not smoke or drink who presents to the emergency department with complaint of diplopia while driving to work today, that resolved prior to arrival to the emergency department.  It appears to have only lasted for a few minutes and was not associated with additional symptoms.  She denies numbness, tingling, weakness, change in vision, headache.  She does report that she has a strange sensation on her left thigh, but otherwise her vision is back to normal.  She had no blurry vision or peripheral vision changes.  She denies cough, congestion, sore throat, chest pain, shortness of breath, nausea, vomiting, diarrhea, abdominal pain, dysuria, hematuria, rashes, additional complaints at this time.      REVIEW OF SYSTEMS  Constitutional:  Denies fever, chills, weight loss or weakness  Eyes:  No pain, discharge, redness  HENT:  Denies sore throat, ear pain, congestion  Respiratory: No SOB, wheeze or cough  Cardiovascular:  No CP, palpitations  GI:  Denies abdominal pain, nausea, vomiting, diarrhea  : Denies dysuria, hematuria  Musculoskeletal:  Denies any new muscle/joint pain, swelling or loss of function.  Skin:  Denies rash, pallor  Neurologic:  Denies headache, focal weakness or sensory changes, reports to diplopia now resolved   lymph: Denies swollen nodes    All other systems negative unless noted in HPI.    PAST MEDICAL HISTORY:  Past Medical History:   Diagnosis Date     Migraines     headaches 3-4 times yearly beginning in 2008, prior to that daily       PAST SURGICAL  HISTORY:  No past surgical history on file.      CURRENT MEDICATIONS:    No current facility-administered medications for this encounter.      Current Outpatient Medications   Medication     aspirin (ASA) 81 MG chewable tablet     almotriptan (AXERT) 12.5 MG tablet     cyclobenzaprine (FLEXERIL) 10 MG tablet     doxycycline hyclate (VIBRA-TABS) 100 MG tablet     DULoxetine (CYMBALTA) 30 MG capsule     guaiFENesin-dextromethorphan (ROBITUSSIN DM) 100-10 MG/5ML syrup     topiramate (TOPAMAX) 100 MG tablet     traMADol (ULTRAM) 50 MG tablet     vitamin D3 (CHOLECALCIFEROL) 2000 units tablet     zolpidem (AMBIEN CR) 12.5 MG CR tablet         ALLERGIES:  Allergies   Allergen Reactions     Food Swelling     Vanilla Blackberry Dannon Yogurt--felt throat swelling per Dr. Navarrete note in Care Everywhere     Morphine Sulfate Nausea     Nortriptyline      Weight gain         FAMILY HISTORY:  No family history on file.    SOCIAL HISTORY:  Social History     Socioeconomic History     Marital status:      Spouse name: Not on file     Number of children: Not on file     Years of education: Not on file     Highest education level: Not on file   Occupational History     Not on file   Social Needs     Financial resource strain: Not on file     Food insecurity     Worry: Not on file     Inability: Not on file     Transportation needs     Medical: Not on file     Non-medical: Not on file   Tobacco Use     Smoking status: Never Smoker     Smokeless tobacco: Never Used   Substance and Sexual Activity     Alcohol use: No     Drug use: No     Sexual activity: Not on file   Lifestyle     Physical activity     Days per week: Not on file     Minutes per session: Not on file     Stress: Not on file   Relationships     Social connections     Talks on phone: Not on file     Gets together: Not on file     Attends Mandaen service: Not on file     Active member of club or organization: Not on file     Attends meetings of clubs or  organizations: Not on file     Relationship status: Not on file     Intimate partner violence     Fear of current or ex partner: Not on file     Emotionally abused: Not on file     Physically abused: Not on file     Forced sexual activity: Not on file   Other Topics Concern     Not on file   Social History Narrative     Not on file       VITALS:  Patient Vitals for the past 24 hrs:   BP Temp Temp src Pulse Resp SpO2 Weight   05/20/21 1558 119/82 98.5  F (36.9  C) Oral 100 16 100 % --   05/20/21 1530 129/91 -- -- 98 -- 99 % --   05/20/21 1515 119/93 -- -- 92 -- 99 % --   05/20/21 1245 125/87 -- -- 112 -- 100 % --   05/20/21 1236 120/74 98.5  F (36.9  C) Tympanic 110 16 99 % 77.1 kg (170 lb)       PHYSICAL EXAM    VITAL SIGNS: /82   Pulse 100   Temp 98.5  F (36.9  C) (Oral)   Resp 16   Wt 77.1 kg (170 lb)   SpO2 100%   BMI 26.63 kg/m      General Appearance: Well-appearing, well-nourished, no acute distress   Head:  Normocephalic, without obvious abnormality, atraumatic  Eyes:  PERRL, conjunctiva/corneas clear, EOM's intact,  ENT:  Lips, mucosa, and tongue normal, membranes are moist without pallor  Neck:  Normal ROM, symmetrical, trachea midline    Cardio:  Regular rate and rhythm, no murmur, rub or gallop, 2+ pulses symmetric in all extremities  Pulm:  Clear to auscultation bilaterally, respirations unlabored,  Abdomen:  Soft, non-tender, no rebound or guarding.  Musculoskeletal: Full ROM, no edema, no cyanosis, good ROM of major joints  Integument:  Warm, Dry, No erythema, No rash.    Neurologic:  Patient is alert and oriented ×3.  Face is symmetric.  Speech is normal.  Visual fields are full.  Cranial nerves II through XII are grossly intact. Motor tone is 5/5 and equal in both upper and lower extremities.  Bilateral symmetric sensation grossly intact upper and lower.  Neg finger-nose. Neg heel-shin.  Neg Rhomberg. Reflexes are 2/4 throughout.    Psychiatric:  Affect normal, Judgment normal, Mood  normal.      LABS  Results for orders placed or performed during the hospital encounter of 05/20/21 (from the past 24 hour(s))   CBC with platelets differential   Result Value Ref Range    WBC 6.8 4.0 - 11.0 10e9/L    RBC Count 4.81 3.8 - 5.2 10e12/L    Hemoglobin 14.5 11.7 - 15.7 g/dL    Hematocrit 43.2 35.0 - 47.0 %    MCV 90 78 - 100 fl    MCH 30.1 26.5 - 33.0 pg    MCHC 33.6 31.5 - 36.5 g/dL    RDW 12.3 10.0 - 15.0 %    Platelet Count 310 150 - 450 10e9/L    Diff Method Automated Method     % Neutrophils 63.5 %    % Lymphocytes 22.7 %    % Monocytes 10.4 %    % Eosinophils 1.8 %    % Basophils 0.6 %    % Immature Granulocytes 1.0 %    Nucleated RBCs 0 0 /100    Absolute Neutrophil 4.4 1.6 - 8.3 10e9/L    Absolute Lymphocytes 1.6 0.8 - 5.3 10e9/L    Absolute Monocytes 0.7 0.0 - 1.3 10e9/L    Absolute Eosinophils 0.1 0.0 - 0.7 10e9/L    Absolute Basophils 0.0 0.0 - 0.2 10e9/L    Abs Immature Granulocytes 0.1 0 - 0.4 10e9/L    Absolute Nucleated RBC 0.0    Basic metabolic panel   Result Value Ref Range    Sodium 141 133 - 144 mmol/L    Potassium 3.3 (L) 3.4 - 5.3 mmol/L    Chloride 111 (H) 94 - 109 mmol/L    Carbon Dioxide 22 20 - 32 mmol/L    Anion Gap 8 3 - 14 mmol/L    Glucose 109 (H) 70 - 99 mg/dL    Urea Nitrogen 6 (L) 7 - 30 mg/dL    Creatinine 0.78 0.52 - 1.04 mg/dL    GFR Estimate 88 >60 mL/min/[1.73_m2]    GFR Estimate If Black >90 >60 mL/min/[1.73_m2]    Calcium 8.9 8.5 - 10.1 mg/dL   Lipid Profile   Result Value Ref Range    Cholesterol 226 (H) <200 mg/dL    Triglycerides 196 (H) <150 mg/dL    HDL Cholesterol 68 >49 mg/dL    LDL Cholesterol Calculated 119 (H) <100 mg/dL    Non HDL Cholesterol 158 (H) <130 mg/dL   MRA Brain ([place]) wo Contrast    Narrative    MRA BRAIN ([place]) WO CONTRAST  5/20/2021 3:05 PM    History: Female, age 51 years, Stroke, follow up    Comparison: None.    Technique: 3-D time-of-flight imaging of the brain without contrast.  Source images as well as 3-D  reconstructed images were reviewed.    Findings:   Vertebral arteries: Normal    Vertebral basilar system: Patent, normal.    Posterior cerebral arteries: Normal. Respective branches are also  normal.    Petrous and cavernous portions of the internal carotid arteries:  Normal.    Middle cerebral arteries: The middle cerebral arteries are patent,  symmetric and normal in contour.   The respective branches are also normal.    Anterior cerebral arteries: Normal. Branches are also normal.    Limited evaluation of the brain: Unremarkable.      Impression    Impression:  Normal examination.     AIDEE RAMIREZ MD   MR Brain w/o & w Contrast    Narrative    MR BRAIN W/O & W CONTRAST  5/20/2021 3:06 PM    History: Female, age 51 years, Transient ischemic attack (TIA);  Diplopia    Comparison: CT scan of the brain 10/26/2017    Technique: Sagittal T1, axial T2, T2 FLAIR, diffusion, gradient echo,  ADC mapping, T1 and 3 plane T1 fat saturation postcontrast 3-D. .    Findings:   Ventricles and sulci: Normal in size and shape.    Gray and white matter: Scattered, nonenhancing white matter lesions  suggesting small vessel disease.    Cerebellopontine angles: Clear    Extra-axial spaces: Clear.    Enhancement: Normal.    Midline structures: Normal in contour.  Globes: Normal.  Orbits: Normal. Intraconal and extraconal fat unremarkable.  Extraocular muscles: Normal.  Paranasal sinuses: Normal.  Mastoid air cells: Normal.  Diffusion: Normal.      Impression    Impression:  Scattered, nonspecific white matter changes suggesting early small  vessel disease without evidence of acute or subacute ischemia.    AIDEE RAMIREZ MD   MRA Neck (Carotids) wo & w Contrast    Narrative    MRA NECK (CAROTIDS) WO & W CONTRAST  5/20/2021 3:06 PM    History: Female, age 51 years, Stroke, follow up    Comparison: None.    Technique: 3-D time-of-flight imaging of the neck with and without  contrast. Source images as well as 3-D reconstructed images  were  reviewed.    Findings:   Visualized portions of the aortic arch: Normal    Innominate artery: Normal.    Visualized portions of the subclavian arteries: Normal.    Common carotid arteries: Normal.     Carotid bifurcations: Normal.    Internal carotid arteries: Normal.    External carotid arteries: Normal in contour. Visualized branches also  appear grossly normal.    Visualized soft tissues of the neck: Grossly normal.      Impression    Impression:  Normal examination.     AIDEE RAMIREZ MD         RADIOLOGY  MRA Neck (Carotids) wo & w Contrast   Final Result   Impression:   Normal examination.       AIDEE RAMIREZ MD      MR Brain w/o & w Contrast   Final Result   Impression:   Scattered, nonspecific white matter changes suggesting early small   vessel disease without evidence of acute or subacute ischemia.      AIDEE RAMIREZ MD      MRA Brain ([place]) wo Contrast   Final Result   Impression:   Normal examination.       AIDEE RAMIREZ MD             The patient has stroke symptoms:         ED Stroke specific documentation           NIHSS PDF     Patient last known well time: 12:30PM  ED Provider first to bedside at: 12:55PM    National Institutes of Health Stroke Scale (Baseline)  Time Performed: 12:55PM     Score    Level of consciousness: (0)   Alert, keenly responsive    LOC questions: (0)   Answers both questions correctly    LOC commands: (0)   Performs both tasks correctly    Best gaze: (0)   Normal    Visual: (0)   No visual loss    Facial palsy: (0)   Normal symmetrical movements    Motor arm (left): (0)   No drift    Motor arm (right): (0)   No drift    Motor leg (left): (0)   No drift    Motor leg (right): (0)   No drift    Limb ataxia: (0)   Absent    Sensory: (0)   Normal- no sensory loss    Best language: (0)   Normal- no aphasia    Dysarthria: (0)   Normal    Extinction and inattention: (0)   No abnormality        Total Score:  0          Stroke Mimics were considered  (including migraine headache, seizure disorder, hypoglycemia (or hyperglycemia), head or spinal trauma, CNS infection, Toxin ingestion and shock state (e.g. sepsis) .      FINAL IMPRESSION:  1. TIA (transient ischemic attack)    2. Diplopia          ED COURSE & MEDICAL DECISION MAKIN:59 PM I met with the patient to gather history and to perform my initial exam. I discussed the plan for care while in the Emergency Department.  3:18 PM spoke with Dr. Gallo, Neurology, who recommended follow up in clinic, to start a daily ASA, and lipid panel prior to discharge      Pertinent Labs & Imaging studies reviewed. (See chart for details)  51 year old female presents to the Emergency Department for evaluation of sudden onset of double vision that occurred for about 10 to 15 minutes prior to spontaneous resolution.  She had no additional symptoms at the time of the event.  Neuro exam in the emergency department was negative.  She was otherwise unremarkable on exam.  Because of the patient's symptoms I did perform an MRI of the head and MRA of the neck and head.  These do not show any evidence of acute pathology that could easily explain her symptoms.  Therefore I discussed the patient with neurology, who plans to follow-up the patient as an outpatient for further management and evaluation.  Patient was agreeable with this plan and remains asymptomatic.  Return precautions were discussed.    At the conclusion of the encounter I discussed the results of all of the tests and the disposition. The questions were answered. The patient or family acknowledged understanding and was agreeable with the care plan.        MEDICATIONS GIVEN IN THE EMERGENCY:  Medications   sodium chloride (PF) 0.9% PF flush 20 mL (20 mLs Intravenous Given 21 1451)   sodium chloride (PF) 0.9% PF flush 20 mL (20 mLs Intravenous Given 21 1451)   gadobutrol (GADAVIST) injection 10 mL (10 mLs Intravenous Given 21 1451)       NEW  PRESCRIPTIONS STARTED AT TODAY'S ER VISIT  Discharge Medication List as of 5/20/2021  3:50 PM      START taking these medications    Details   aspirin (ASA) 81 MG chewable tablet Take 1 tablet (81 mg) by mouth daily, Disp-30 tablet, R-0, Local Print              Sam Fermin D.O.  Emergency Medicine  [facility]  [address] [place] [id number] [phone]  Dept: [phone]       Sam Fermin,   05/20/21 1321

## 2021-05-20 NOTE — ED TRIAGE NOTES
Pt presents that she is seeing double vision but has gotten better now  Pt reports hx iritis in the past.  Pt reports that she doesn't feel right.  Pt hx of migraines none right now.  Gait steady.  Denies blurred vision

## 2021-05-21 ENCOUNTER — TELEPHONE (OUTPATIENT)
Dept: CASE MANAGEMENT | Facility: HOSPITAL | Age: 51
End: 2021-05-21

## 2021-05-21 NOTE — TELEPHONE ENCOUNTER
Care Transitions focused note:      Left message for Sam Powell at [facility] about patient needing follow-up ASAP from an ED visit.  Left this SW telephone number so he could call back with any questions as this is a Rubin Navarrete patient.

## 2023-04-18 ENCOUNTER — HOSPITAL ENCOUNTER (EMERGENCY)
Facility: HOSPITAL | Age: 53
Discharge: HOME | End: 2023-04-18
Attending: NURSE PRACTITIONER | Admitting: NURSE PRACTITIONER
Payer: COMMERCIAL

## 2023-04-18 ENCOUNTER — APPOINTMENT (OUTPATIENT)
Dept: GENERAL RADIOLOGY | Facility: HOSPITAL | Age: 53
End: 2023-04-18
Attending: NURSE PRACTITIONER
Payer: COMMERCIAL

## 2023-04-18 VITALS
DIASTOLIC BLOOD PRESSURE: 85 MMHG | SYSTOLIC BLOOD PRESSURE: 124 MMHG | RESPIRATION RATE: 16 BRPM | TEMPERATURE: 97.2 F | HEART RATE: 79 BPM | OXYGEN SATURATION: 99 %

## 2023-04-18 DIAGNOSIS — M25.511 RIGHT SHOULDER PAIN: Primary | ICD-10-CM

## 2023-04-18 PROCEDURE — 99213 OFFICE O/P EST LOW 20 MIN: CPT | Performed by: NURSE PRACTITIONER

## 2023-04-18 PROCEDURE — 73030 X-RAY EXAM OF SHOULDER: CPT | Mod: RT

## 2023-04-18 PROCEDURE — G0463 HOSPITAL OUTPT CLINIC VISIT: HCPCS

## 2023-04-18 RX ORDER — PREDNISONE 20 MG/1
TABLET ORAL
Qty: 10 TABLET | Refills: 0 | Status: SHIPPED | OUTPATIENT
Start: 2023-04-18 | End: 2023-12-04

## 2023-04-18 ASSESSMENT — ENCOUNTER SYMPTOMS
PSYCHIATRIC NEGATIVE: 1
CHILLS: 0
DIARRHEA: 0
VOMITING: 0
NAUSEA: 0
SHORTNESS OF BREATH: 0
FEVER: 0
MYALGIAS: 1

## 2023-04-18 ASSESSMENT — ACTIVITIES OF DAILY LIVING (ADL): ADLS_ACUITY_SCORE: 35

## 2023-04-18 NOTE — ED TRIAGE NOTES
Patient presents with c/o right shoulder pain after falling down stairs 3 weeks ago and reports that pain is worsening instead of improving.  CMS intact in right upper extremity, radial pulse palpated and strong.

## 2023-04-18 NOTE — DISCHARGE INSTRUCTIONS
Prednisone as ordered  Rest  Ice as needed  [facility] by calling [phone] for further evaluation if no improvement with above.  Follow-up with primary care provider or return to urgent care/ED with any worsening in condition or additional concerns.

## 2023-04-18 NOTE — ED TRIAGE NOTES
Pt presents with c/o right shoulder pain. Reports that she fell down her outside stairs 3 weeks ago. States that it was painful but pain has been worsening. Limited ROM due to pain. CMS intact. Pt took tylenol this morning, without relief. Denies icing.

## 2023-04-18 NOTE — ED PROVIDER NOTES
History     Chief Complaint   Patient presents with     Shoulder Injury     HPI  Dilcia Santillan is a 52 year old female who presents to urgent care today with complaints of right shoulder pain which started after she fell down the stairs 3 weeks ago.  CMS intact.  Full strength.  Took Tylenol this morning without relief.  Patient also takes tramadol and cyclobenzaprine as needed.  Denies any fever, chills, nausea, vomiting, diarrhea, shortness of breath or chest pain.  Denies any other injuries.  No other concerns.    Allergies:  Allergies   Allergen Reactions     Food Swelling     Vanilla Blackberry Dannon Yogurt--felt throat swelling per Dr. Navarrete note in Care Everywhere     Morphine Sulfate Nausea     Nortriptyline      Weight gain         Problem List:    Patient Active Problem List    Diagnosis Date Noted     CAP (community acquired pneumonia) 03/29/2020     Priority: Medium     Fibromyalgia 03/18/2020     Priority: Medium     Long term (current) use of opiate analgesic 03/18/2020     Priority: Medium     Overview:   OPIOID DRUG AGREEMENT FORM  Opioid Agreement Date: 08/10/2011  Last UDT (Urine Drug Test) on date:  2/4/06  Pain Dx: Low Back Pain  Last Pain Visit: 8/16/10  Preferred Pharmacy: [facility] in [place]  Comments: Future appointment 9/22/10 with Dr. TONI Azevedo & 11/17/10 with Dr. ZULLY Navarrete       Closed head injury with concussion 11/03/2010     Priority: Medium     Mild cognitive impairment 11/03/2010     Priority: Medium     Chronic bilateral low back pain without sciatica 04/27/2007     Priority: Medium     Overview:   IMO Update 10/11       Migraine without intractable migraine 08/23/2006     Priority: Medium     Overview:   IMO Update 10/11          Past Medical History:    Past Medical History:   Diagnosis Date     Migraines        Past Surgical History:    No past surgical history on file.    Family History:    No family history on file.    Social History:  Marital Status:    [2]  Social History     Tobacco Use     Smoking status: Never     Smokeless tobacco: Never   Substance Use Topics     Alcohol use: No     Drug use: No        Medications:    predniSONE (DELTASONE) 20 MG tablet  almotriptan (AXERT) 12.5 MG tablet  aspirin (ASA) 81 MG chewable tablet  cyclobenzaprine (FLEXERIL) 10 MG tablet  doxycycline hyclate (VIBRA-TABS) 100 MG tablet  DULoxetine (CYMBALTA) 30 MG capsule  guaiFENesin-dextromethorphan (ROBITUSSIN DM) 100-10 MG/5ML syrup  topiramate (TOPAMAX) 100 MG tablet  traMADol (ULTRAM) 50 MG tablet  vitamin D3 (CHOLECALCIFEROL) 2000 units tablet  zolpidem (AMBIEN CR) 12.5 MG CR tablet      Review of Systems   Constitutional: Negative for chills and fever.   Respiratory: Negative for shortness of breath.    Cardiovascular: Negative for chest pain.   Gastrointestinal: Negative for diarrhea, nausea and vomiting.   Musculoskeletal: Positive for myalgias.   Skin: Negative.    Psychiatric/Behavioral: Negative.      Physical Exam   BP: 124/85  Pulse: 79  Temp: 97.2  F (36.2  C)  Resp: 16  SpO2: 99 %    Physical Exam  Vitals and nursing note reviewed.   Constitutional:       General: She is not in acute distress.     Appearance: Normal appearance. She is not ill-appearing or toxic-appearing.   Cardiovascular:      Rate and Rhythm: Normal rate and regular rhythm.      Pulses: Normal pulses.      Heart sounds: Normal heart sounds.   Pulmonary:      Effort: Pulmonary effort is normal.      Breath sounds: Normal breath sounds.   Musculoskeletal:      Right shoulder: Tenderness present. No swelling, deformity, effusion, laceration, bony tenderness or crepitus. Normal range of motion. Normal strength. Normal pulse.      Right wrist: Normal.   Skin:     General: Skin is warm and dry.      Capillary Refill: Capillary refill takes less than 2 seconds.   Neurological:      Mental Status: She is alert.   Psychiatric:         Mood and Affect: Mood normal.       ED Course     Results for orders  placed or performed during the hospital encounter of 04/18/23 (from the past 24 hour(s))   XR Shoulder Right G/E 3 Views    Narrative    PROCEDURE:  XR SHOULDER RIGHT G/E 3 VIEWS    HISTORY: fell down outside steps 3 weeks ago injured right shoulder.  Limited ROM due to pain. CMS Intact. Denies hx of fractures,  dislocations, surgeries..    COMPARISON:  None.    TECHNIQUE:  4 views right shoulder.    FINDINGS:  No acute or healing fracture or dislocation is identified.  The joint spaces are preserved. No foreign body is seen.       Impression    IMPRESSION: No acute fracture.      BARTOLO GARVIN MD         SYSTEM ID:  [id number]       Medications - No data to display    Assessments & Plan (with Medical Decision Making)     I have reviewed the nursing notes.    I have reviewed the findings, diagnosis, plan and need for follow up with the patient.  (M25.089) Right shoulder pain  (primary encounter diagnosis)  Plan: Orthopedic  Referral  Patient ambulatory with a nontoxic appearance.  Patient has right shoulder pain which resulted after falling down the stairs approximately 3 weeks ago.  Has Tylenol, tramadol and cyclobenzaprine at home as needed.  X-ray right shoulder completed which shows no acute fracture.  Full ROM.  No skin abnormalities.  Full strength.  Will prescribe short course of prednisone for pain.  If no improvement with prednisone, patient [facility].  Referral placed.  Patient to follow-up with primary care provider or return to urgent care/ED with any worsening in condition or additional concerns.  Patient is in agreement with treatment plan.    New Prescriptions    PREDNISONE (DELTASONE) 20 MG TABLET    Take two tablets (= 40mg) each day for 5 (five) days     Final diagnoses:   Right shoulder pain     4/18/2023   [facility]     Megan Sawyer NP  04/18/23 [id number]

## 2023-12-04 ENCOUNTER — HOSPITAL ENCOUNTER (EMERGENCY)
Facility: HOSPITAL | Age: 53
Discharge: HOME | End: 2023-12-04
Payer: COMMERCIAL

## 2023-12-04 ENCOUNTER — APPOINTMENT (OUTPATIENT)
Dept: GENERAL RADIOLOGY | Facility: HOSPITAL | Age: 53
End: 2023-12-04
Payer: COMMERCIAL

## 2023-12-04 VITALS
TEMPERATURE: 99.4 F | DIASTOLIC BLOOD PRESSURE: 71 MMHG | OXYGEN SATURATION: 98 % | SYSTOLIC BLOOD PRESSURE: 110 MMHG | HEART RATE: 93 BPM | RESPIRATION RATE: 20 BRPM

## 2023-12-04 DIAGNOSIS — M25.562 LEFT KNEE PAIN: ICD-10-CM

## 2023-12-04 PROCEDURE — 250N000011 HC RX IP 250 OP 636: Mod: JZ

## 2023-12-04 PROCEDURE — G0463 HOSPITAL OUTPT CLINIC VISIT: HCPCS | Mod: 25

## 2023-12-04 PROCEDURE — 96372 THER/PROPH/DIAG INJ SC/IM: CPT

## 2023-12-04 PROCEDURE — 73562 X-RAY EXAM OF KNEE 3: CPT | Mod: LT

## 2023-12-04 PROCEDURE — 99213 OFFICE O/P EST LOW 20 MIN: CPT

## 2023-12-04 RX ORDER — HYDROCODONE BITARTRATE AND ACETAMINOPHEN 5; 325 MG/1; MG/1
1 TABLET ORAL EVERY 6 HOURS PRN
Qty: 6 TABLET | Refills: 0 | Status: ON HOLD | OUTPATIENT
Start: 2023-12-04 | End: 2024-07-29

## 2023-12-04 RX ORDER — KETOROLAC TROMETHAMINE 30 MG/ML
30 INJECTION, SOLUTION INTRAMUSCULAR; INTRAVENOUS ONCE
Status: COMPLETED | OUTPATIENT
Start: 2023-12-04 | End: 2023-12-04

## 2023-12-04 RX ADMIN — KETOROLAC TROMETHAMINE 30 MG: 30 INJECTION, SOLUTION INTRAMUSCULAR; INTRAVENOUS at 09:45

## 2023-12-04 ASSESSMENT — ACTIVITIES OF DAILY LIVING (ADL): ADLS_ACUITY_SCORE: 35

## 2023-12-04 ASSESSMENT — ENCOUNTER SYMPTOMS
APPETITE CHANGE: 0
NUMBNESS: 0
CHILLS: 0
ACTIVITY CHANGE: 0
ARTHRALGIAS: 1
JOINT SWELLING: 1
COLOR CHANGE: 0
WOUND: 0

## 2023-12-04 NOTE — ED PROVIDER NOTES
History     Chief Complaint   Patient presents with    Knee Pain     HPI  Dilcia Santillan is a 53 year old female who presents to the urgent care with a 1 month history of left sided knee pain after twisting and falling from ladder, landing on left knee. She was seen in the clinic after incident and had a non concerning XR at that time. Was also given hydrocodone at that time. Concerned today as pain as worsened and her knee is locking up. She has been taking tylenol with minimal change in pain. Denies chest pain, shortness of breath, fevers, chills, numbness/tingling, and n/v. No hx of DVTs.     Allergies:  Allergies   Allergen Reactions    Food Swelling     Vanilla Blackberry Dannon Yogurt--felt throat swelling per Dr. Navarrete note in Care Everywhere    Morphine Sulfate Nausea    Nortriptyline      Weight gain         Problem List:    Patient Active Problem List    Diagnosis Date Noted    CAP (community acquired pneumonia) 03/29/2020     Priority: Medium    Fibromyalgia 03/18/2020     Priority: Medium    Long term (current) use of opiate analgesic 03/18/2020     Priority: Medium     Overview:   OPIOID DRUG AGREEMENT FORM  Opioid Agreement Date: 08/10/2011  Last UDT (Urine Drug Test) on date:  2/4/06  Pain Dx: Low Back Pain  Last Pain Visit: 8/16/10  Preferred Pharmacy: [facility] in [place]  Comments: Future appointment 9/22/10 with Dr. TONI Azevedo & 11/17/10 with Dr. ZULLY Navarrete      Closed head injury with concussion 11/03/2010     Priority: Medium    Mild cognitive impairment 11/03/2010     Priority: Medium    Chronic bilateral low back pain without sciatica 04/27/2007     Priority: Medium     Overview:   IMO Update 10/11      Migraine without intractable migraine 08/23/2006     Priority: Medium     Overview:   IMO Update 10/11          Past Medical History:    Past Medical History:   Diagnosis Date    Migraines        Past Surgical History:    No past surgical history on file.    Family History:    No  family history on file.    Social History:  Marital Status:   [2]  Social History     Tobacco Use    Smoking status: Never    Smokeless tobacco: Never   Substance Use Topics    Alcohol use: No    Drug use: No        Medications:    almotriptan (AXERT) 12.5 MG tablet  aspirin (ASA) 81 MG chewable tablet  cyclobenzaprine (FLEXERIL) 10 MG tablet  doxycycline hyclate (VIBRA-TABS) 100 MG tablet  DULoxetine (CYMBALTA) 30 MG capsule  topiramate (TOPAMAX) 100 MG tablet  vitamin D3 (CHOLECALCIFEROL) 2000 units tablet  zolpidem (AMBIEN CR) 12.5 MG CR tablet  HYDROcodone-acetaminophen (NORCO) 5-325 MG tablet          Review of Systems   Constitutional:  Negative for activity change, appetite change and chills.   Cardiovascular:  Negative for leg swelling.   Musculoskeletal:  Positive for arthralgias, gait problem and joint swelling.   Skin:  Negative for color change, pallor, rash and wound.   Neurological:  Negative for numbness.   All other systems reviewed and are negative.      Physical Exam   BP: 110/71  Pulse: 93  Temp: 99.4  F (37.4  C)  Resp: 20  SpO2: 98 %      Physical Exam  Vitals and nursing note reviewed.   Constitutional:       General: She is not in acute distress.     Appearance: Normal appearance. She is not ill-appearing or toxic-appearing.   Cardiovascular:      Pulses:           Popliteal pulses are 2+ on the left side.        Posterior tibial pulses are 2+ on the left side.   Musculoskeletal:         General: Swelling and tenderness present. No deformity or signs of injury.      Left knee: Swelling and bony tenderness present. Decreased range of motion. Tenderness present over the medial joint line and lateral joint line. Normal pulse.      Right lower leg: No edema.      Left lower leg: No edema.      Left ankle: Normal pulse.   Skin:     General: Skin is warm and dry.      Capillary Refill: Capillary refill takes less than 2 seconds.      Coloration: Skin is not pale.      Findings: No bruising or  erythema.   Neurological:      General: No focal deficit present.      Mental Status: She is alert and oriented to person, place, and time.   Psychiatric:         Mood and Affect: Mood normal.         Behavior: Behavior normal.         Thought Content: Thought content normal.         Judgment: Judgment normal.         ED Course                 Procedures              Results for orders placed or performed during the hospital encounter of 12/04/23 (from the past 24 hour(s))   XR Knee Left 3 Views    Narrative    PROCEDURE:  XR KNEE LEFT 3 VIEWS    HISTORY: medial, lateral, anterior pain after fall 1 month ago    COMPARISON:  None.    TECHNIQUE:  3 views of the left knee were obtained.    FINDINGS:  No fracture or dislocation is identified. The joint spaces  are preserved.        Impression    IMPRESSION: Normal left knee      PARAG PETERS MD         SYSTEM ID:  [id number]       Medications   ketorolac (TORADOL) injection 30 mg (30 mg Intramuscular $Given 12/4/23 0955)       Assessments & Plan (with Medical Decision Making)     I have reviewed the nursing notes.    I have reviewed the findings, diagnosis, plan and need for follow up with the patient.  Dilcia Santillan is a 53 year old female who presents to the urgent care with a 1 month history of left sided knee pain after twisting and falling from ladder, landing on left knee. She was seen in the clinic after incident and had a non concerning XR at that time. Was also given hydrocodone at that time. Concerned today as pain as worsened and her knee is locking up. She has been taking tylenol with minimal change in pain. Denies chest pain, shortness of breath, fevers, chills, numbness/tingling, and n/v. No hx of DVTs.     MDM: XR of left knee: no fracture or dislocation, per radiologist. Outpatient MRI ordered. Ortho referral placed.   VSS and afebrile. Strong pulses to right lower extremity. Skin pink and warm. Able to bear weight and ambulate with discomfort. She  has crutches at home. Ace wrap provided. Supportive measures and return precautions discussed. She is in agreement with plan.     (M25.357) Left knee pain  Plan: MR Knee Left w/o Contrast, Orthopedic         Referral   You can take 650-1000mg of tylenol every 6 hours, max of 4000mg in 24 hours and 600-800mg of ibuprofen every 8 hours, max of 2400mg in 24 hours.     Hydrocodone sparingly. Do not drive or drink alcohol while taking.     Ice for 15-20 minutes every 2-3 hours. Please make sure to protect skin to prevent frost bite. Try to stay off knee as much as possible. Use the crutches you have at home.     Return with any increased pain, inability to bear weight, or other concerns. Understanding verbalized.               New Prescriptions    HYDROCODONE-ACETAMINOPHEN (NORCO) 5-325 MG TABLET    Take 1 tablet by mouth every 6 hours as needed for severe pain       Final diagnoses:   Left knee pain       12/4/2023   [facility]       Carrie Gates NP  12/04/23 0952

## 2023-12-04 NOTE — ED TRIAGE NOTES
Pt presents with c/o left knee pain due to falling off ladder and twisting   Pt states did have xray done and xray looked normal  Pt reports happened opening weekend of hunting   Pt states it is very painful and has limited ROM due to pain and pt states when trying to get up from sitting position it will lock up   Pt had tylenol last at 0300

## 2023-12-04 NOTE — DISCHARGE INSTRUCTIONS
You can take 650-1000mg of tylenol every 6 hours, max of 4000mg in 24 hours and 600-800mg of ibuprofen every 8 hours, max of 2400mg in 24 hours.     Hydrocodone sparingly. Do not drive or drink alcohol while taking.     Ice for 15-20 minutes every 2-3 hours. Please make sure to protect skin to prevent frost bite. Try to stay off knee as much as possible. Use the crutches you have at home.     Return with any increased pain, inability to bear weight, or other concerns.

## 2023-12-08 ENCOUNTER — HOSPITAL ENCOUNTER (OUTPATIENT)
Dept: MRI IMAGING | Facility: HOSPITAL | Age: 53
Discharge: HOME | End: 2023-12-08
Payer: COMMERCIAL

## 2023-12-08 DIAGNOSIS — M25.562 LEFT KNEE PAIN: ICD-10-CM

## 2023-12-08 PROCEDURE — 73721 MRI JNT OF LWR EXTRE W/O DYE: CPT | Mod: LT

## 2024-07-27 ENCOUNTER — APPOINTMENT (OUTPATIENT)
Dept: CT IMAGING | Facility: HOSPITAL | Age: 54
DRG: 399 | End: 2024-07-27
Attending: NURSE PRACTITIONER
Payer: COMMERCIAL

## 2024-07-27 ENCOUNTER — ANESTHESIA (OUTPATIENT)
Dept: SURGERY | Facility: HOSPITAL | Age: 54
DRG: 399 | End: 2024-07-27
Payer: COMMERCIAL

## 2024-07-27 ENCOUNTER — HOSPITAL ENCOUNTER (INPATIENT)
Facility: HOSPITAL | Age: 54
LOS: 2 days | Discharge: HOME | DRG: 399 | End: 2024-07-29
Attending: NURSE PRACTITIONER | Admitting: SURGERY
Payer: COMMERCIAL

## 2024-07-27 ENCOUNTER — ANESTHESIA EVENT (OUTPATIENT)
Dept: SURGERY | Facility: HOSPITAL | Age: 54
DRG: 399 | End: 2024-07-27
Payer: COMMERCIAL

## 2024-07-27 ENCOUNTER — HOSPITAL ENCOUNTER (OUTPATIENT)
Facility: HOSPITAL | Age: 54
End: 2024-07-27
Attending: SURGERY | Admitting: SURGERY
Payer: COMMERCIAL

## 2024-07-27 DIAGNOSIS — K35.30 ACUTE APPENDICITIS WITH LOCALIZED PERITONITIS, WITHOUT PERFORATION, ABSCESS, OR GANGRENE: Primary | ICD-10-CM

## 2024-07-27 DIAGNOSIS — K35.33 ACUTE APPENDICITIS WITH PERFORATION, LOCALIZED PERITONITIS, ABSCESS, AND GANGRENE: ICD-10-CM

## 2024-07-27 DIAGNOSIS — K35.200 ACUTE APPENDICITIS WITH GENERALIZED PERITONITIS WITHOUT GANGRENE, PERFORATION, OR ABSCESS: ICD-10-CM

## 2024-07-27 LAB
ALBUMIN SERPL BCG-MCNC: 4.8 G/DL (ref 3.5–5.2)
ALBUMIN UR-MCNC: NEGATIVE MG/DL
ALP SERPL-CCNC: 91 U/L (ref 40–150)
ALT SERPL W P-5'-P-CCNC: 32 U/L (ref 0–50)
ANION GAP SERPL CALCULATED.3IONS-SCNC: 14 MMOL/L (ref 7–15)
APPEARANCE UR: CLEAR
AST SERPL W P-5'-P-CCNC: 18 U/L (ref 0–45)
BACTERIA #/AREA URNS HPF: ABNORMAL /HPF
BASOPHILS # BLD MANUAL: 0 10E3/UL (ref 0–0.2)
BASOPHILS NFR BLD MANUAL: 0 %
BILIRUB DIRECT SERPL-MCNC: <0.2 MG/DL (ref 0–0.3)
BILIRUB SERPL-MCNC: 0.4 MG/DL
BILIRUB UR QL STRIP: NEGATIVE
BUN SERPL-MCNC: 5.8 MG/DL (ref 6–20)
CALCIUM SERPL-MCNC: 9.5 MG/DL (ref 8.8–10.4)
CHLORIDE SERPL-SCNC: 104 MMOL/L (ref 98–107)
COLOR UR AUTO: ABNORMAL
CREAT SERPL-MCNC: 0.82 MG/DL (ref 0.51–0.95)
EGFRCR SERPLBLD CKD-EPI 2021: 85 ML/MIN/1.73M2
EOSINOPHIL # BLD MANUAL: 0 10E3/UL (ref 0–0.7)
EOSINOPHIL NFR BLD MANUAL: 0 %
ERYTHROCYTE [DISTWIDTH] IN BLOOD BY AUTOMATED COUNT: 12.2 % (ref 10–15)
GLUCOSE SERPL-MCNC: 92 MG/DL (ref 70–99)
GLUCOSE UR STRIP-MCNC: NEGATIVE MG/DL
HCO3 SERPL-SCNC: 19 MMOL/L (ref 22–29)
HCT VFR BLD AUTO: 41.4 % (ref 35–47)
HGB BLD-MCNC: 14 G/DL (ref 11.7–15.7)
HGB UR QL STRIP: NEGATIVE
HOLD SPECIMEN: NORMAL
KETONES UR STRIP-MCNC: NEGATIVE MG/DL
LEUKOCYTE ESTERASE UR QL STRIP: ABNORMAL
LIPASE SERPL-CCNC: 19 U/L (ref 13–60)
LYMPHOCYTES # BLD MANUAL: 1.9 10E3/UL (ref 0.8–5.3)
LYMPHOCYTES NFR BLD MANUAL: 22 %
MCH RBC QN AUTO: 30.6 PG (ref 26.5–33)
MCHC RBC AUTO-ENTMCNC: 33.8 G/DL (ref 31.5–36.5)
MCV RBC AUTO: 90 FL (ref 78–100)
MONOCYTES # BLD MANUAL: 1 10E3/UL (ref 0–1.3)
MONOCYTES NFR BLD MANUAL: 11 %
MUCOUS THREADS #/AREA URNS LPF: PRESENT /LPF
NEUTROPHILS # BLD MANUAL: 5.9 10E3/UL (ref 1.6–8.3)
NEUTROPHILS NFR BLD MANUAL: 67 %
NITRATE UR QL: NEGATIVE
NRBC # BLD AUTO: 0 10E3/UL
NRBC BLD AUTO-RTO: 0 /100
PH UR STRIP: 5.5 [PH] (ref 4.7–8)
PLAT MORPH BLD: ABNORMAL
PLATELET # BLD AUTO: 245 10E3/UL (ref 150–450)
POTASSIUM SERPL-SCNC: 3.5 MMOL/L (ref 3.4–5.3)
PROT SERPL-MCNC: 7.6 G/DL (ref 6.4–8.3)
RBC # BLD AUTO: 4.58 10E6/UL (ref 3.8–5.2)
RBC MORPH BLD: ABNORMAL
RBC URINE: 1 /HPF
SODIUM SERPL-SCNC: 137 MMOL/L (ref 135–145)
SP GR UR STRIP: 1.01 (ref 1–1.03)
SQUAMOUS EPITHELIAL: 3 /HPF
UROBILINOGEN UR STRIP-MCNC: NORMAL MG/DL
VARIANT LYMPHS BLD QL SMEAR: PRESENT
WBC # BLD AUTO: 8.8 10E3/UL (ref 4–11)
WBC URINE: 2 /HPF

## 2024-07-27 PROCEDURE — 88304 TISSUE EXAM BY PATHOLOGIST: CPT | Mod: TC | Performed by: SURGERY

## 2024-07-27 PROCEDURE — 710N000010 HC RECOVERY PHASE 1, LEVEL 2, PER MIN: Performed by: SURGERY

## 2024-07-27 PROCEDURE — 88304 TISSUE EXAM BY PATHOLOGIST: CPT | Mod: 26 | Performed by: PATHOLOGY

## 2024-07-27 PROCEDURE — 82248 BILIRUBIN DIRECT: CPT | Performed by: NURSE PRACTITIONER

## 2024-07-27 PROCEDURE — 36415 COLL VENOUS BLD VENIPUNCTURE: CPT | Performed by: NURSE PRACTITIONER

## 2024-07-27 PROCEDURE — 83690 ASSAY OF LIPASE: CPT | Performed by: NURSE PRACTITIONER

## 2024-07-27 PROCEDURE — 96361 HYDRATE IV INFUSION ADD-ON: CPT

## 2024-07-27 PROCEDURE — 74177 CT ABD & PELVIS W/CONTRAST: CPT

## 2024-07-27 PROCEDURE — 258N000003 HC RX IP 258 OP 636: Performed by: SURGERY

## 2024-07-27 PROCEDURE — 0DTJ4ZZ RESECTION OF APPENDIX, PERCUTANEOUS ENDOSCOPIC APPROACH: ICD-10-PCS | Performed by: SURGERY

## 2024-07-27 PROCEDURE — 258N000003 HC RX IP 258 OP 636: Performed by: NURSE PRACTITIONER

## 2024-07-27 PROCEDURE — 250N000011 HC RX IP 250 OP 636: Performed by: NURSE PRACTITIONER

## 2024-07-27 PROCEDURE — 44970 LAPAROSCOPY APPENDECTOMY: CPT | Performed by: SURGERY

## 2024-07-27 PROCEDURE — 250N000009 HC RX 250: Performed by: NURSE ANESTHETIST, CERTIFIED REGISTERED

## 2024-07-27 PROCEDURE — 250N000009 HC RX 250: Performed by: SURGERY

## 2024-07-27 PROCEDURE — 80053 COMPREHEN METABOLIC PANEL: CPT | Performed by: NURSE PRACTITIONER

## 2024-07-27 PROCEDURE — 81001 URINALYSIS AUTO W/SCOPE: CPT | Performed by: NURSE PRACTITIONER

## 2024-07-27 PROCEDURE — 87086 URINE CULTURE/COLONY COUNT: CPT | Performed by: NURSE PRACTITIONER

## 2024-07-27 PROCEDURE — 370N000017 HC ANESTHESIA TECHNICAL FEE, PER MIN: Performed by: SURGERY

## 2024-07-27 PROCEDURE — 120N000001 HC R&B MED SURG/OB

## 2024-07-27 PROCEDURE — 250N000011 HC RX IP 250 OP 636: Performed by: NURSE ANESTHETIST, CERTIFIED REGISTERED

## 2024-07-27 PROCEDURE — 99285 EMERGENCY DEPT VISIT HI MDM: CPT | Mod: 25

## 2024-07-27 PROCEDURE — 360N000076 HC SURGERY LEVEL 3, PER MIN: Performed by: SURGERY

## 2024-07-27 PROCEDURE — 99140 ANES COMP EMERGENCY COND: CPT | Performed by: NURSE ANESTHETIST, CERTIFIED REGISTERED

## 2024-07-27 PROCEDURE — 44970 LAPAROSCOPY APPENDECTOMY: CPT | Performed by: NURSE ANESTHETIST, CERTIFIED REGISTERED

## 2024-07-27 PROCEDURE — 272N000001 HC OR GENERAL SUPPLY STERILE: Performed by: SURGERY

## 2024-07-27 PROCEDURE — 99221 1ST HOSP IP/OBS SF/LOW 40: CPT | Mod: 57 | Performed by: SURGERY

## 2024-07-27 PROCEDURE — 96375 TX/PRO/DX INJ NEW DRUG ADDON: CPT

## 2024-07-27 PROCEDURE — 85007 BL SMEAR W/DIFF WBC COUNT: CPT | Performed by: NURSE PRACTITIONER

## 2024-07-27 PROCEDURE — 85027 COMPLETE CBC AUTOMATED: CPT | Performed by: NURSE PRACTITIONER

## 2024-07-27 PROCEDURE — 250N000025 HC SEVOFLURANE, PER MIN: Performed by: SURGERY

## 2024-07-27 PROCEDURE — 99285 EMERGENCY DEPT VISIT HI MDM: CPT | Performed by: NURSE PRACTITIONER

## 2024-07-27 PROCEDURE — 258N000003 HC RX IP 258 OP 636: Performed by: NURSE ANESTHETIST, CERTIFIED REGISTERED

## 2024-07-27 PROCEDURE — 96365 THER/PROPH/DIAG IV INF INIT: CPT

## 2024-07-27 RX ORDER — HYDROMORPHONE HYDROCHLORIDE 1 MG/ML
0.4 INJECTION, SOLUTION INTRAMUSCULAR; INTRAVENOUS; SUBCUTANEOUS EVERY 5 MIN PRN
Status: DISCONTINUED | OUTPATIENT
Start: 2024-07-27 | End: 2024-07-27 | Stop reason: HOSPADM

## 2024-07-27 RX ORDER — ONDANSETRON 2 MG/ML
4 INJECTION INTRAMUSCULAR; INTRAVENOUS EVERY 6 HOURS PRN
Status: DISCONTINUED | OUTPATIENT
Start: 2024-07-27 | End: 2024-07-29 | Stop reason: HOSPADM

## 2024-07-27 RX ORDER — PROPOFOL 10 MG/ML
INJECTION, EMULSION INTRAVENOUS PRN
Status: DISCONTINUED | OUTPATIENT
Start: 2024-07-27 | End: 2024-07-27

## 2024-07-27 RX ORDER — ONDANSETRON 4 MG/1
4 TABLET, ORALLY DISINTEGRATING ORAL EVERY 30 MIN PRN
Status: DISCONTINUED | OUTPATIENT
Start: 2024-07-27 | End: 2024-07-27 | Stop reason: HOSPADM

## 2024-07-27 RX ORDER — DEXAMETHASONE SODIUM PHOSPHATE 4 MG/ML
INJECTION, SOLUTION INTRA-ARTICULAR; INTRALESIONAL; INTRAMUSCULAR; INTRAVENOUS; SOFT TISSUE PRN
Status: DISCONTINUED | OUTPATIENT
Start: 2024-07-27 | End: 2024-07-27

## 2024-07-27 RX ORDER — ONDANSETRON 2 MG/ML
INJECTION INTRAMUSCULAR; INTRAVENOUS PRN
Status: DISCONTINUED | OUTPATIENT
Start: 2024-07-27 | End: 2024-07-27

## 2024-07-27 RX ORDER — ONDANSETRON 2 MG/ML
4 INJECTION INTRAMUSCULAR; INTRAVENOUS EVERY 30 MIN PRN
Status: DISCONTINUED | OUTPATIENT
Start: 2024-07-27 | End: 2024-07-27 | Stop reason: HOSPADM

## 2024-07-27 RX ORDER — IBUPROFEN 200 MG
600 TABLET ORAL EVERY 6 HOURS PRN
Status: DISCONTINUED | OUTPATIENT
Start: 2024-08-02 | End: 2024-07-29 | Stop reason: HOSPADM

## 2024-07-27 RX ORDER — ONDANSETRON 4 MG/1
4 TABLET, ORALLY DISINTEGRATING ORAL EVERY 6 HOURS PRN
Status: DISCONTINUED | OUTPATIENT
Start: 2024-07-27 | End: 2024-07-29 | Stop reason: HOSPADM

## 2024-07-27 RX ORDER — NALOXONE HYDROCHLORIDE 0.4 MG/ML
0.2 INJECTION, SOLUTION INTRAMUSCULAR; INTRAVENOUS; SUBCUTANEOUS
Status: DISCONTINUED | OUTPATIENT
Start: 2024-07-27 | End: 2024-07-29 | Stop reason: HOSPADM

## 2024-07-27 RX ORDER — FENTANYL CITRATE 50 UG/ML
INJECTION, SOLUTION INTRAMUSCULAR; INTRAVENOUS PRN
Status: DISCONTINUED | OUTPATIENT
Start: 2024-07-27 | End: 2024-07-27

## 2024-07-27 RX ORDER — BUPIVACAINE HYDROCHLORIDE AND EPINEPHRINE 2.5; 5 MG/ML; UG/ML
INJECTION, SOLUTION INFILTRATION; PERINEURAL PRN
Status: DISCONTINUED | OUTPATIENT
Start: 2024-07-27 | End: 2024-07-27 | Stop reason: HOSPADM

## 2024-07-27 RX ORDER — ONDANSETRON 2 MG/ML
4 INJECTION INTRAMUSCULAR; INTRAVENOUS EVERY 6 HOURS PRN
Status: DISCONTINUED | OUTPATIENT
Start: 2024-07-27 | End: 2024-07-27

## 2024-07-27 RX ORDER — HYDROMORPHONE HYDROCHLORIDE 1 MG/ML
0.2 INJECTION, SOLUTION INTRAMUSCULAR; INTRAVENOUS; SUBCUTANEOUS EVERY 5 MIN PRN
Status: DISCONTINUED | OUTPATIENT
Start: 2024-07-27 | End: 2024-07-27 | Stop reason: HOSPADM

## 2024-07-27 RX ORDER — OXYCODONE HYDROCHLORIDE 5 MG/1
5 TABLET ORAL EVERY 4 HOURS PRN
Status: DISCONTINUED | OUTPATIENT
Start: 2024-07-27 | End: 2024-07-29 | Stop reason: HOSPADM

## 2024-07-27 RX ORDER — KETOROLAC TROMETHAMINE 30 MG/ML
INJECTION, SOLUTION INTRAMUSCULAR; INTRAVENOUS PRN
Status: DISCONTINUED | OUTPATIENT
Start: 2024-07-27 | End: 2024-07-27

## 2024-07-27 RX ORDER — FENTANYL CITRATE 50 UG/ML
25 INJECTION, SOLUTION INTRAMUSCULAR; INTRAVENOUS EVERY 5 MIN PRN
Status: DISCONTINUED | OUTPATIENT
Start: 2024-07-27 | End: 2024-07-27 | Stop reason: HOSPADM

## 2024-07-27 RX ORDER — POLYETHYLENE GLYCOL 3350 17 G/17G
17 POWDER, FOR SOLUTION ORAL DAILY
Status: DISCONTINUED | OUTPATIENT
Start: 2024-07-28 | End: 2024-07-28

## 2024-07-27 RX ORDER — ALBUTEROL SULFATE 0.83 MG/ML
2.5 SOLUTION RESPIRATORY (INHALATION) EVERY 4 HOURS PRN
Status: DISCONTINUED | OUTPATIENT
Start: 2024-07-27 | End: 2024-07-27 | Stop reason: HOSPADM

## 2024-07-27 RX ORDER — PIPERACILLIN SODIUM, TAZOBACTAM SODIUM 3; .375 G/15ML; G/15ML
3.38 INJECTION, POWDER, LYOPHILIZED, FOR SOLUTION INTRAVENOUS EVERY 6 HOURS
Status: DISCONTINUED | OUTPATIENT
Start: 2024-07-28 | End: 2024-07-29 | Stop reason: HOSPADM

## 2024-07-27 RX ORDER — PROCHLORPERAZINE MALEATE 10 MG
10 TABLET ORAL EVERY 6 HOURS PRN
Status: DISCONTINUED | OUTPATIENT
Start: 2024-07-27 | End: 2024-07-28

## 2024-07-27 RX ORDER — HYDRALAZINE HYDROCHLORIDE 20 MG/ML
2.5-5 INJECTION INTRAMUSCULAR; INTRAVENOUS EVERY 10 MIN PRN
Status: DISCONTINUED | OUTPATIENT
Start: 2024-07-27 | End: 2024-07-27 | Stop reason: HOSPADM

## 2024-07-27 RX ORDER — FENTANYL CITRATE 50 UG/ML
50 INJECTION, SOLUTION INTRAMUSCULAR; INTRAVENOUS EVERY 5 MIN PRN
Status: DISCONTINUED | OUTPATIENT
Start: 2024-07-27 | End: 2024-07-27 | Stop reason: HOSPADM

## 2024-07-27 RX ORDER — HYDROMORPHONE HCL IN WATER/PF 6 MG/30 ML
0.4 PATIENT CONTROLLED ANALGESIA SYRINGE INTRAVENOUS
Status: DISCONTINUED | OUTPATIENT
Start: 2024-07-27 | End: 2024-07-28

## 2024-07-27 RX ORDER — BUPIVACAINE HYDROCHLORIDE AND EPINEPHRINE 2.5; 5 MG/ML; UG/ML
INJECTION, SOLUTION EPIDURAL; INFILTRATION; INTRACAUDAL; PERINEURAL
Status: DISPENSED
Start: 2024-07-27 | End: 2024-07-28

## 2024-07-27 RX ORDER — ONDANSETRON 4 MG/1
4 TABLET, ORALLY DISINTEGRATING ORAL EVERY 6 HOURS PRN
Status: DISCONTINUED | OUTPATIENT
Start: 2024-07-27 | End: 2024-07-27

## 2024-07-27 RX ORDER — NALOXONE HYDROCHLORIDE 0.4 MG/ML
0.4 INJECTION, SOLUTION INTRAMUSCULAR; INTRAVENOUS; SUBCUTANEOUS
Status: DISCONTINUED | OUTPATIENT
Start: 2024-07-27 | End: 2024-07-29 | Stop reason: HOSPADM

## 2024-07-27 RX ORDER — HYDROMORPHONE HCL IN WATER/PF 6 MG/30 ML
0.2 PATIENT CONTROLLED ANALGESIA SYRINGE INTRAVENOUS
Status: DISCONTINUED | OUTPATIENT
Start: 2024-07-27 | End: 2024-07-28

## 2024-07-27 RX ORDER — DEXAMETHASONE SODIUM PHOSPHATE 4 MG/ML
4 INJECTION, SOLUTION INTRA-ARTICULAR; INTRALESIONAL; INTRAMUSCULAR; INTRAVENOUS; SOFT TISSUE
Status: DISCONTINUED | OUTPATIENT
Start: 2024-07-27 | End: 2024-07-27 | Stop reason: HOSPADM

## 2024-07-27 RX ORDER — SODIUM CHLORIDE, SODIUM LACTATE, POTASSIUM CHLORIDE, CALCIUM CHLORIDE 600; 310; 30; 20 MG/100ML; MG/100ML; MG/100ML; MG/100ML
INJECTION, SOLUTION INTRAVENOUS CONTINUOUS
Status: DISCONTINUED | OUTPATIENT
Start: 2024-07-27 | End: 2024-07-27 | Stop reason: HOSPADM

## 2024-07-27 RX ORDER — ACETAMINOPHEN 325 MG/1
975 TABLET ORAL EVERY 8 HOURS
Status: DISCONTINUED | OUTPATIENT
Start: 2024-07-28 | End: 2024-07-29 | Stop reason: HOSPADM

## 2024-07-27 RX ORDER — BISACODYL 10 MG
10 SUPPOSITORY, RECTAL RECTAL DAILY PRN
Status: DISCONTINUED | OUTPATIENT
Start: 2024-07-30 | End: 2024-07-28

## 2024-07-27 RX ORDER — SODIUM CHLORIDE, SODIUM LACTATE, POTASSIUM CHLORIDE, CALCIUM CHLORIDE 600; 310; 30; 20 MG/100ML; MG/100ML; MG/100ML; MG/100ML
INJECTION, SOLUTION INTRAVENOUS CONTINUOUS
Status: DISCONTINUED | OUTPATIENT
Start: 2024-07-28 | End: 2024-07-29 | Stop reason: HOSPADM

## 2024-07-27 RX ORDER — ONDANSETRON 2 MG/ML
4 INJECTION INTRAMUSCULAR; INTRAVENOUS EVERY 30 MIN PRN
Status: CANCELLED | OUTPATIENT
Start: 2024-07-27

## 2024-07-27 RX ORDER — LIDOCAINE 40 MG/G
CREAM TOPICAL
Status: CANCELLED | OUTPATIENT
Start: 2024-07-27

## 2024-07-27 RX ORDER — LABETALOL 20 MG/4 ML (5 MG/ML) INTRAVENOUS SYRINGE
10
Status: DISCONTINUED | OUTPATIENT
Start: 2024-07-27 | End: 2024-07-27 | Stop reason: HOSPADM

## 2024-07-27 RX ORDER — ACETAMINOPHEN 325 MG/1
650 TABLET ORAL EVERY 4 HOURS PRN
Status: DISCONTINUED | OUTPATIENT
Start: 2024-07-30 | End: 2024-07-29 | Stop reason: HOSPADM

## 2024-07-27 RX ORDER — IOPAMIDOL 755 MG/ML
75 INJECTION, SOLUTION INTRAVASCULAR ONCE
Status: COMPLETED | OUTPATIENT
Start: 2024-07-27 | End: 2024-07-27

## 2024-07-27 RX ORDER — OXYCODONE HYDROCHLORIDE 5 MG/1
10 TABLET ORAL EVERY 4 HOURS PRN
Status: DISCONTINUED | OUTPATIENT
Start: 2024-07-27 | End: 2024-07-29 | Stop reason: HOSPADM

## 2024-07-27 RX ORDER — ONDANSETRON 4 MG/1
4 TABLET, ORALLY DISINTEGRATING ORAL EVERY 30 MIN PRN
Status: CANCELLED | OUTPATIENT
Start: 2024-07-27

## 2024-07-27 RX ORDER — SODIUM CHLORIDE, SODIUM LACTATE, POTASSIUM CHLORIDE, CALCIUM CHLORIDE 600; 310; 30; 20 MG/100ML; MG/100ML; MG/100ML; MG/100ML
INJECTION, SOLUTION INTRAVENOUS CONTINUOUS PRN
Status: DISCONTINUED | OUTPATIENT
Start: 2024-07-27 | End: 2024-07-27

## 2024-07-27 RX ORDER — NALOXONE HYDROCHLORIDE 0.4 MG/ML
0.1 INJECTION, SOLUTION INTRAMUSCULAR; INTRAVENOUS; SUBCUTANEOUS
Status: DISCONTINUED | OUTPATIENT
Start: 2024-07-27 | End: 2024-07-27 | Stop reason: HOSPADM

## 2024-07-27 RX ORDER — OXYCODONE HYDROCHLORIDE 5 MG/1
5 TABLET ORAL EVERY 4 HOURS PRN
Status: DISCONTINUED | OUTPATIENT
Start: 2024-07-27 | End: 2024-07-27

## 2024-07-27 RX ORDER — SODIUM CHLORIDE, SODIUM LACTATE, POTASSIUM CHLORIDE, CALCIUM CHLORIDE 600; 310; 30; 20 MG/100ML; MG/100ML; MG/100ML; MG/100ML
INJECTION, SOLUTION INTRAVENOUS CONTINUOUS
Status: CANCELLED | OUTPATIENT
Start: 2024-07-27

## 2024-07-27 RX ORDER — KETOROLAC TROMETHAMINE 30 MG/ML
30 INJECTION, SOLUTION INTRAMUSCULAR; INTRAVENOUS EVERY 6 HOURS PRN
Status: DISCONTINUED | OUTPATIENT
Start: 2024-07-28 | End: 2024-07-28

## 2024-07-27 RX ORDER — DEXAMETHASONE SODIUM PHOSPHATE 10 MG/ML
4 INJECTION, SOLUTION INTRAMUSCULAR; INTRAVENOUS
Status: CANCELLED | OUTPATIENT
Start: 2024-07-27

## 2024-07-27 RX ORDER — AMOXICILLIN 250 MG
1 CAPSULE ORAL 2 TIMES DAILY
Status: DISCONTINUED | OUTPATIENT
Start: 2024-07-28 | End: 2024-07-28

## 2024-07-27 RX ORDER — LIDOCAINE 40 MG/G
CREAM TOPICAL
Status: DISCONTINUED | OUTPATIENT
Start: 2024-07-27 | End: 2024-07-29 | Stop reason: HOSPADM

## 2024-07-27 RX ORDER — OXYCODONE HYDROCHLORIDE 5 MG/1
10 TABLET ORAL EVERY 4 HOURS PRN
Status: DISCONTINUED | OUTPATIENT
Start: 2024-07-27 | End: 2024-07-27

## 2024-07-27 RX ORDER — NALOXONE HYDROCHLORIDE 0.4 MG/ML
0.1 INJECTION, SOLUTION INTRAMUSCULAR; INTRAVENOUS; SUBCUTANEOUS
Status: CANCELLED | OUTPATIENT
Start: 2024-07-27

## 2024-07-27 RX ORDER — HYDROMORPHONE HYDROCHLORIDE 1 MG/ML
0.5 INJECTION, SOLUTION INTRAMUSCULAR; INTRAVENOUS; SUBCUTANEOUS
Status: COMPLETED | OUTPATIENT
Start: 2024-07-27 | End: 2024-07-27

## 2024-07-27 RX ORDER — LIDOCAINE HYDROCHLORIDE 20 MG/ML
INJECTION, SOLUTION INFILTRATION; PERINEURAL PRN
Status: DISCONTINUED | OUTPATIENT
Start: 2024-07-27 | End: 2024-07-27

## 2024-07-27 RX ORDER — PIPERACILLIN SODIUM, TAZOBACTAM SODIUM 4; .5 G/20ML; G/20ML
4.5 INJECTION, POWDER, LYOPHILIZED, FOR SOLUTION INTRAVENOUS ONCE
Status: COMPLETED | OUTPATIENT
Start: 2024-07-27 | End: 2024-07-27

## 2024-07-27 RX ADMIN — PHENYLEPHRINE HYDROCHLORIDE 100 MCG: 10 INJECTION INTRAVENOUS at 21:43

## 2024-07-27 RX ADMIN — SUGAMMADEX 200 MG: 100 INJECTION, SOLUTION INTRAVENOUS at 22:27

## 2024-07-27 RX ADMIN — Medication 60 MG: at 21:35

## 2024-07-27 RX ADMIN — SODIUM CHLORIDE, POTASSIUM CHLORIDE, SODIUM LACTATE AND CALCIUM CHLORIDE 1000 ML: 600; 310; 30; 20 INJECTION, SOLUTION INTRAVENOUS at 18:32

## 2024-07-27 RX ADMIN — KETOROLAC TROMETHAMINE 30 MG: 30 INJECTION, SOLUTION INTRAMUSCULAR at 22:28

## 2024-07-27 RX ADMIN — PHENYLEPHRINE HYDROCHLORIDE 100 MCG: 10 INJECTION INTRAVENOUS at 21:41

## 2024-07-27 RX ADMIN — PROPOFOL 70 MG: 10 INJECTION, EMULSION INTRAVENOUS at 22:32

## 2024-07-27 RX ADMIN — SODIUM CHLORIDE, POTASSIUM CHLORIDE, SODIUM LACTATE AND CALCIUM CHLORIDE: 600; 310; 30; 20 INJECTION, SOLUTION INTRAVENOUS at 21:27

## 2024-07-27 RX ADMIN — SODIUM CHLORIDE, POTASSIUM CHLORIDE, SODIUM LACTATE AND CALCIUM CHLORIDE: 600; 310; 30; 20 INJECTION, SOLUTION INTRAVENOUS at 22:37

## 2024-07-27 RX ADMIN — PROPOFOL 150 MG: 10 INJECTION, EMULSION INTRAVENOUS at 21:35

## 2024-07-27 RX ADMIN — ONDANSETRON 4 MG: 2 INJECTION INTRAMUSCULAR; INTRAVENOUS at 22:11

## 2024-07-27 RX ADMIN — MIDAZOLAM 1 MG: 1 INJECTION INTRAMUSCULAR; INTRAVENOUS at 21:27

## 2024-07-27 RX ADMIN — SODIUM CHLORIDE, POTASSIUM CHLORIDE, SODIUM LACTATE AND CALCIUM CHLORIDE: 600; 310; 30; 20 INJECTION, SOLUTION INTRAVENOUS at 23:57

## 2024-07-27 RX ADMIN — PHENYLEPHRINE HYDROCHLORIDE 100 MCG: 10 INJECTION INTRAVENOUS at 21:50

## 2024-07-27 RX ADMIN — HYDROMORPHONE HYDROCHLORIDE 0.5 MG: 1 INJECTION, SOLUTION INTRAMUSCULAR; INTRAVENOUS; SUBCUTANEOUS at 18:31

## 2024-07-27 RX ADMIN — IOPAMIDOL 75 ML: 755 INJECTION, SOLUTION INTRAVENOUS at 19:03

## 2024-07-27 RX ADMIN — LIDOCAINE HYDROCHLORIDE 40 MG: 20 INJECTION, SOLUTION INFILTRATION; PERINEURAL at 21:35

## 2024-07-27 RX ADMIN — ROCURONIUM BROMIDE 40 MG: 10 INJECTION INTRAVENOUS at 21:41

## 2024-07-27 RX ADMIN — PIPERACILLIN AND TAZOBACTAM 4.5 G: 4; .5 INJECTION, POWDER, FOR SOLUTION INTRAVENOUS at 20:06

## 2024-07-27 RX ADMIN — FENTANYL CITRATE 50 MCG: 50 INJECTION INTRAMUSCULAR; INTRAVENOUS at 21:27

## 2024-07-27 RX ADMIN — FENTANYL CITRATE 50 MCG: 50 INJECTION INTRAMUSCULAR; INTRAVENOUS at 22:31

## 2024-07-27 RX ADMIN — DEXAMETHASONE SODIUM PHOSPHATE 4 MG: 4 INJECTION, SOLUTION INTRA-ARTICULAR; INTRALESIONAL; INTRAMUSCULAR; INTRAVENOUS; SOFT TISSUE at 22:07

## 2024-07-27 ASSESSMENT — ENCOUNTER SYMPTOMS
DYSURIA: 0
NAUSEA: 0
CARDIOVASCULAR NEGATIVE: 1
EYES NEGATIVE: 1
VOMITING: 0
CONSTIPATION: 0
BLOOD IN STOOL: 0
HEMATURIA: 0
RESPIRATORY NEGATIVE: 1
PSYCHIATRIC NEGATIVE: 1
FLANK PAIN: 1
ALLERGIC/IMMUNOLOGIC NEGATIVE: 1
CONSTITUTIONAL NEGATIVE: 1
ENDOCRINE NEGATIVE: 1
NEUROLOGICAL NEGATIVE: 1
HEMATOLOGIC/LYMPHATIC NEGATIVE: 1
ABDOMINAL PAIN: 1
DIARRHEA: 0

## 2024-07-27 ASSESSMENT — ACTIVITIES OF DAILY LIVING (ADL)
ADLS_ACUITY_SCORE: 37

## 2024-07-27 ASSESSMENT — COLUMBIA-SUICIDE SEVERITY RATING SCALE - C-SSRS
1. IN THE PAST MONTH, HAVE YOU WISHED YOU WERE DEAD OR WISHED YOU COULD GO TO SLEEP AND NOT WAKE UP?: NO
6. HAVE YOU EVER DONE ANYTHING, STARTED TO DO ANYTHING, OR PREPARED TO DO ANYTHING TO END YOUR LIFE?: NO
2. HAVE YOU ACTUALLY HAD ANY THOUGHTS OF KILLING YOURSELF IN THE PAST MONTH?: NO

## 2024-07-27 NOTE — ED PROVIDER NOTES
History     Chief Complaint   Patient presents with    Abdominal Pain     HPI  Dilcia Santillan is a 54 year old individual with history of chronic low back pain, fibromyalgia, chronic opioid use, comes in for right lower quadrant abdominal pain.  Patient states developed severe right pain in the abdomen last night.  Continues to have the pain and now radiates into the right back.  States any bit of movement or bouncing makes the pain severe.  Comes in for this reason.  No fevers or chills.  No nausea or vomiting.  No diarrhea or constipation.  No melena or hematochezia reported.  No dysuria or hematuria.  No vaginal bleeding or discharge.  Did not eat today except for drink water multiple hours prior.  States has history of C-sections but no abdominal surgeries otherwise.    Allergies:  Allergies   Allergen Reactions    Food Swelling     Vanilla Blackberry Dannon Yogurt--felt throat swelling per Dr. Navarrete note in Care Everywhere    Morphine Sulfate Nausea    Nortriptyline      Weight gain         Problem List:    Patient Active Problem List    Diagnosis Date Noted    Acute appendicitis with perforation, localized peritonitis, abscess, and gangrene 07/27/2024     Priority: Medium    CAP (community acquired pneumonia) 03/29/2020     Priority: Medium    Fibromyalgia 03/18/2020     Priority: Medium    Long term (current) use of opiate analgesic 03/18/2020     Priority: Medium     Overview:   OPIOID DRUG AGREEMENT FORM  Opioid Agreement Date: 08/10/2011  Last UDT (Urine Drug Test) on date:  2/4/06  Pain Dx: Low Back Pain  Last Pain Visit: 8/16/10  Preferred Pharmacy: [facility] in [place]  Comments: Future appointment 9/22/10 with Dr. TONI Azevedo & 11/17/10 with Dr. ZULLY Navarrete      Closed head injury with concussion 11/03/2010     Priority: Medium    Mild cognitive impairment 11/03/2010     Priority: Medium    Chronic bilateral low back pain without sciatica 04/27/2007     Priority: Medium     Overview:   IMO  Update 10/11      Migraine without intractable migraine 08/23/2006     Priority: Medium     Overview:   IMO Update 10/11          Past Medical History:    Past Medical History:   Diagnosis Date    Migraines        Past Surgical History:    No past surgical history on file.    Family History:    No family history on file.    Social History:  Marital Status:   [2]  Social History     Tobacco Use    Smoking status: Never    Smokeless tobacco: Never   Substance Use Topics    Alcohol use: No    Drug use: No        Medications:    almotriptan (AXERT) 12.5 MG tablet  aspirin (ASA) 81 MG chewable tablet  cyclobenzaprine (FLEXERIL) 10 MG tablet  doxycycline hyclate (VIBRA-TABS) 100 MG tablet  DULoxetine (CYMBALTA) 30 MG capsule  HYDROcodone-acetaminophen (NORCO) 5-325 MG tablet  topiramate (TOPAMAX) 100 MG tablet  vitamin D3 (CHOLECALCIFEROL) 2000 units tablet  zolpidem (AMBIEN CR) 12.5 MG CR tablet          Review of Systems   Constitutional: Negative.    HENT: Negative.     Eyes: Negative.    Respiratory: Negative.     Cardiovascular: Negative.    Gastrointestinal:  Positive for abdominal pain (Right lower quadrant). Negative for blood in stool, constipation, diarrhea, nausea and vomiting.   Endocrine: Negative.    Genitourinary:  Positive for flank pain (Right-sided). Negative for dysuria, hematuria, pelvic pain, vaginal bleeding and vaginal discharge.   Skin: Negative.    Allergic/Immunologic: Negative.    Neurological: Negative.    Hematological: Negative.    Psychiatric/Behavioral: Negative.         Physical Exam   BP: 125/88  Pulse: 106  Temp: 98.5  F (36.9  C)  Resp: 16  Weight: 68.6 kg (151 lb 3.8 oz)  SpO2: 97 %      GENERAL APPEARANCE:  The patient is a 54 year old well-developed, well-nourished individual that appears as stated age.  LUNGS:  Breathing is easy.  Breath sounds are equal and clear bilaterally.  No wheezes, rhonchi, or rales.  HEART:  Regular rate and rhythm with normal S1 and S2.  No murmurs,  gallops, or rubs.  ABDOMEN:  Soft.  No mass.  Tenderness and guarding to the right lower quadrant with palpation.  McBurney point tenderness present.  Rovsing side present.  No organomegaly or hernia.  Bowel sounds are present.  No CVA tenderness or flank mass.  No abdominal bruits or thrills present upon auscultation/palpation.  GENITOURINARY: No anterior pelvic tenderness, hernia, or obvious mass noted upon palpation.  NEUROLOGIC:  No focal sensory or motor deficits are noted.   PSYCHIATRIC:  The patient is awake, alert, and oriented x4.  Recent and remote memory is intact.  Appropriate mood and affect.  Calm and cooperative with history and physical exam.  SKIN:  Warm, dry, and well perfused.  Good turgor.  No lesions, nodules, or rashes are noted.  No bruising noted.      Comment: Discrepancies between my note and notes on behalf of the nursing team or other care providers are secondary to my findings reflecting my physical examination and questioning of the patient.  Any conflicting information provided is not in line with my examination of the patient.       ED Course     ED Course as of 07/27/24 2035   Sat Jul 27, 2024 1815 Labs ordered.   1816 In to see patient and history/physical completed.    1820 1 L LR ordered for hydration and hydromorphone 0.5 mg IV for pain.   1822 CT abdomen pelvis with IV contrast ordered.   1948 CT positive for appendicitis but no rupture or abscess.  Zosyn 4.5 g IV ordered.  Contacting general surgeon.   1949 Discussed case with Dr. Grimm from general surgery.  General surgeon to come in.   2032 Patient seen by Dr. Grimm from general surgery.  Will be taken to surgery.                 Results for orders placed or performed during the hospital encounter of 07/27/24 (from the past 24 hour(s))   CBC with platelets differential    Narrative    The following orders were created for panel order CBC with platelets differential.  Procedure                               Abnormality          Status                     ---------                               -----------         ------                     CBC with platelets and d...[[id number]]                      Final result               Manual Differential[[id number]]          Abnormal            Final result                 Please view results for these tests on the individual orders.   Basic metabolic panel   Result Value Ref Range    Sodium 137 135 - 145 mmol/L    Potassium 3.5 3.4 - 5.3 mmol/L    Chloride 104 98 - 107 mmol/L    Carbon Dioxide (CO2) 19 (L) 22 - 29 mmol/L    Anion Gap 14 7 - 15 mmol/L    Urea Nitrogen 5.8 (L) 6.0 - 20.0 mg/dL    Creatinine 0.82 0.51 - 0.95 mg/dL    GFR Estimate 85 >60 mL/min/1.73m2    Calcium 9.5 8.8 - 10.4 mg/dL    Glucose 92 70 - 99 mg/dL   Hepatic function panel   Result Value Ref Range    Protein Total 7.6 6.4 - 8.3 g/dL    Albumin 4.8 3.5 - 5.2 g/dL    Bilirubin Total 0.4 <=1.2 mg/dL    Alkaline Phosphatase 91 40 - 150 U/L    AST 18 0 - 45 U/L    ALT 32 0 - 50 U/L    Bilirubin Direct <0.20 0.00 - 0.30 mg/dL   Lipase   Result Value Ref Range    Lipase 19 13 - 60 U/L   CBC with platelets and differential   Result Value Ref Range    WBC Count 8.8 4.0 - 11.0 10e3/uL    RBC Count 4.58 3.80 - 5.20 10e6/uL    Hemoglobin 14.0 11.7 - 15.7 g/dL    Hematocrit 41.4 35.0 - 47.0 %    MCV 90 78 - 100 fL    MCH 30.6 26.5 - 33.0 pg    MCHC 33.8 31.5 - 36.5 g/dL    RDW 12.2 10.0 - 15.0 %    Platelet Count 245 150 - 450 10e3/uL    NRBCs per 100 WBC 0 <1 /100    Absolute NRBCs 0.0 10e3/uL   Manual Differential   Result Value Ref Range    % Neutrophils 67 %    % Lymphocytes 22 %    % Monocytes 11 %    % Eosinophils 0 %    % Basophils 0 %    Absolute Neutrophils 5.9 1.6 - 8.3 10e3/uL    Absolute Lymphocytes 1.9 0.8 - 5.3 10e3/uL    Absolute Monocytes 1.0 0.0 - 1.3 10e3/uL    Absolute Eosinophils 0.0 0.0 - 0.7 10e3/uL    Absolute Basophils 0.0 0.0 - 0.2 10e3/uL    RBC Morphology Confirmed RBC Indices     Platelet Assessment (A)  Automated Count Confirmed. Platelet morphology is normal.     Automated Count Confirmed. Giant platelets are present.    Reactive Lymphocytes Present (A) None Seen   Milo Draw    Narrative    The following orders were created for panel order Milo Draw.  Procedure                               Abnormality         Status                     ---------                               -----------         ------                     Extra Blue Top Tube[[id number]]                              Final result               Extra Red Top Tube[[id number]]                               Final result               Extra Green Top (Lithium...[[id number]]                      Final result                 Please view results for these tests on the individual orders.   Extra Blue Top Tube   Result Value Ref Range    Hold Specimen JIC    Extra Red Top Tube   Result Value Ref Range    Hold Specimen JIC    Extra Green Top (Lithium Heparin) Tube   Result Value Ref Range    Hold Specimen JIC    UA with Microscopic reflex to Culture    Specimen: Urine, Clean Catch   Result Value Ref Range    Color Urine Light Yellow Colorless, Straw, Light Yellow, Yellow    Appearance Urine Clear Clear    Glucose Urine Negative Negative mg/dL    Bilirubin Urine Negative Negative    Ketones Urine Negative Negative mg/dL    Specific Gravity Urine 1.013 1.003 - 1.035    Blood Urine Negative Negative    pH Urine 5.5 4.7 - 8.0    Protein Albumin Urine Negative Negative mg/dL    Urobilinogen Urine Normal Normal, 2.0 mg/dL    Nitrite Urine Negative Negative    Leukocyte Esterase Urine Moderate (A) Negative    Bacteria Urine Few (A) None Seen /HPF    Mucus Urine Present (A) None Seen /LPF    RBC Urine 1 <=2 /HPF    WBC Urine 2 <=5 /HPF    Squamous Epithelials Urine 3 (H) <=1 /HPF    Narrative    Urine Culture ordered based on laboratory criteria   CT Abdomen Pelvis w Contrast    Narrative    Exam:CT ABDOMEN PELVIS W CONTRAST    History: 54 years Female with right  lower quadrant pain     Comparisons: 8/25/2016    Technique: Axial CT imaging of the abdomen and pelvis was performed  with contrast. Coronal and sagittal reconstructions were obtained.   This exam was performed using one or more of the following dose  reduction techniques:  Automated exposure control, adjustment of the ZABRINA and/or KV according  to patient's size, and/or use of iterative reconstruction technique.       FINDINGS:  Lung bases:The lung bases are clear    Abdomen:  Liver:Unremarkable  Gallbladder and biliary tree:No calcified gallstones are present.  There is no evidence of biliary dilatation.  Pancreas:Unremarkable  Spleen:Unremarkable  Adrenals:Normal    Kidneys and ureters:Unremarkable    Lymph nodes:There is no significant lymphadenopathy    Bowel:No abnormally distended or thickened loops of bowel are present.  There is no evidence of bowel obstruction.    Appendix: There is abnormal dilatation of the appendix there is  periappendiceal edema. The appendix measures 11 mm.    Vessels:Unremarkable    Osseous structures:Unremarkable    Pelvis:There is no evidence of mass or lymphadenopathy. No abnormal  fluid collections are present.            Impression    IMPRESSION: Acute appendicitis. There is periappendiceal edema. No  evidence of abscess or free air at this time.    ANUJ VÁSQUEZ MD         SYSTEM ID:  [id number]       Medications   piperacillin-tazobactam (ZOSYN) 4.5 g vial to attach to  mL bag (4.5 g Intravenous $New Bag 7/27/24 2006)   lactated ringers BOLUS 1,000 mL (0 mLs Intravenous Stopped 7/27/24 1959)   HYDROmorphone (PF) (DILAUDID) injection 0.5 mg (0.5 mg Intravenous $Given 7/27/24 1831)   iopamidol (ISOVUE-370) solution 75 mL (75 mLs Intravenous $Given 7/27/24 1903)   sodium chloride (PF) 0.9% PF flush 50 mL (50 mLs Intravenous $Given 7/27/24 1903)       Assessments & Plan (with Medical Decision Making)     I have reviewed the nursing notes.    I have reviewed the findings,  diagnosis, plan and need for follow up with the patient.    Summary:  Patient presents to the ER today for abdominal pain.  Potential diagnosis which have been considered and evaluated include appendicitis, cholecystitis, ovarian torsion, ureteral stone, pyelonephritis, UTI, bowel obstruction, mesenteric ischemia, as well as others. Many of these have been excluded using the various modalities and assessment as noted on the chart. At the present time, the diagnosis given seems to be the most likely acute appendicitis without rupture.  Upon arrival, vitals signs show blood pressure 125/88 with a pulse of 106.  Temperature 36.9  C.  Respirations 16 with oxygenation 97% on room air.  The patient is alert and oriented but in acute pain on arrival.  Cardiac and respiratory examination normal.  Right lower quadrant tenderness to palpation with McBurney point tenderness and positive Rovsing sign.  Very mild right CVA tenderness.  No hernia or mass.  No anterior pelvic tenderness.  IV established and 1 L LR given for hydration.  Hydromorphone 0.5 mg IV ordered for pain control.  Lab work obtained showing WBC of 8.8 with hemoglobin of 14.0.  Electrolytes, renal, hepatic functions benign.  Lipase normal at 19.  UA negative.  CT abdomen pelvis with IV contrast conducted showing acute appendicitis.  Periappendiceal edema.  No evidence of abscess or free air.  Initiated Zosyn 4.5 g IV.  Discussed case with general surgeon Dr. Grimm.  Patient seen by general surgeon and will be taken to surgery.      Critical Care Time: None    Impression and plan discussed with patient. Questions answered, concerns addressed, indications for urgent re-evaluation reviewed, and  given. Patient/Parent/Caregiver agree with treatment plan and have no further questions at this time.      This note was created by the Dragon Voice Dictation System. Inadvertent typographical errors, due to software recognition problems, may still exist.              New Prescriptions    No medications on file       Final diagnoses:   Acute appendicitis with generalized peritonitis without gangrene, perforation, or abscess       7/27/2024   [facility]       Steve Buenrostro APRN CNP  07/27/24 2035

## 2024-07-27 NOTE — ED TRIAGE NOTES
"Pt presents with \"intense pain\" on her right side going into her back. Pain started last night and has worsened today. Pt denies NVD, no fevers. Pt denies urinary issues, no hx of kidney stones. Pt endorses  x2 no other major abdominal surgeries. Pt states every bump made the pain worse driving here.         "

## 2024-07-28 LAB
BACTERIA UR CULT: NORMAL
BASOPHILS # BLD MANUAL: 0 10E3/UL (ref 0–0.2)
BASOPHILS NFR BLD MANUAL: 0 %
EOSINOPHIL # BLD MANUAL: 0 10E3/UL (ref 0–0.7)
EOSINOPHIL NFR BLD MANUAL: 0 %
ERYTHROCYTE [DISTWIDTH] IN BLOOD BY AUTOMATED COUNT: 12.1 % (ref 10–15)
GLUCOSE BLDC GLUCOMTR-MCNC: 123 MG/DL (ref 70–99)
HCT VFR BLD AUTO: 36.6 % (ref 35–47)
HGB BLD-MCNC: 12.6 G/DL (ref 11.7–15.7)
HOLD SPECIMEN: NORMAL
LYMPHOCYTES # BLD MANUAL: 0.9 10E3/UL (ref 0.8–5.3)
LYMPHOCYTES NFR BLD MANUAL: 9 %
MCH RBC QN AUTO: 31.2 PG (ref 26.5–33)
MCHC RBC AUTO-ENTMCNC: 34.4 G/DL (ref 31.5–36.5)
MCV RBC AUTO: 91 FL (ref 78–100)
MONOCYTES # BLD MANUAL: 1.4 10E3/UL (ref 0–1.3)
MONOCYTES NFR BLD MANUAL: 14 %
NEUTROPHILS # BLD MANUAL: 7.5 10E3/UL (ref 1.6–8.3)
NEUTROPHILS NFR BLD MANUAL: 77 %
NRBC # BLD AUTO: 0 10E3/UL
NRBC BLD AUTO-RTO: 0 /100
PLAT MORPH BLD: ABNORMAL
PLATELET # BLD AUTO: 200 10E3/UL (ref 150–450)
RBC # BLD AUTO: 4.04 10E6/UL (ref 3.8–5.2)
RBC MORPH BLD: ABNORMAL
WBC # BLD AUTO: 9.7 10E3/UL (ref 4–11)

## 2024-07-28 PROCEDURE — 120N000001 HC R&B MED SURG/OB

## 2024-07-28 PROCEDURE — 36415 COLL VENOUS BLD VENIPUNCTURE: CPT | Performed by: SURGERY

## 2024-07-28 PROCEDURE — 999N000157 HC STATISTIC RCP TIME EA 10 MIN

## 2024-07-28 PROCEDURE — 250N000013 HC RX MED GY IP 250 OP 250 PS 637: Performed by: SURGERY

## 2024-07-28 PROCEDURE — 85007 BL SMEAR W/DIFF WBC COUNT: CPT | Performed by: SURGERY

## 2024-07-28 PROCEDURE — 85027 COMPLETE CBC AUTOMATED: CPT | Performed by: SURGERY

## 2024-07-28 PROCEDURE — 250N000011 HC RX IP 250 OP 636: Performed by: SURGERY

## 2024-07-28 RX ORDER — TOPIRAMATE 25 MG/1
100 TABLET, FILM COATED ORAL 2 TIMES DAILY
Status: DISCONTINUED | OUTPATIENT
Start: 2024-07-28 | End: 2024-07-29 | Stop reason: HOSPADM

## 2024-07-28 RX ORDER — DIPHENHYDRAMINE HCL 25 MG
25-50 CAPSULE ORAL EVERY 6 HOURS PRN
Status: DISCONTINUED | OUTPATIENT
Start: 2024-07-28 | End: 2024-07-29 | Stop reason: HOSPADM

## 2024-07-28 RX ORDER — DULOXETIN HYDROCHLORIDE 30 MG/1
30 CAPSULE, DELAYED RELEASE ORAL DAILY
Status: DISCONTINUED | OUTPATIENT
Start: 2024-07-28 | End: 2024-07-29 | Stop reason: HOSPADM

## 2024-07-28 RX ADMIN — PIPERACILLIN AND TAZOBACTAM 3.38 G: 3; .375 INJECTION, POWDER, FOR SOLUTION INTRAVENOUS at 05:20

## 2024-07-28 RX ADMIN — OXYCODONE HYDROCHLORIDE 10 MG: 5 TABLET ORAL at 08:13

## 2024-07-28 RX ADMIN — POLYETHYLENE GLYCOL 3350 17 G: 17 POWDER, FOR SOLUTION ORAL at 08:13

## 2024-07-28 RX ADMIN — OXYCODONE HYDROCHLORIDE 10 MG: 5 TABLET ORAL at 12:31

## 2024-07-28 RX ADMIN — HYDROMORPHONE HYDROCHLORIDE 0.2 MG: 0.2 INJECTION, SOLUTION INTRAMUSCULAR; INTRAVENOUS; SUBCUTANEOUS at 00:11

## 2024-07-28 RX ADMIN — TOPIRAMATE 100 MG: 25 TABLET, FILM COATED ORAL at 16:35

## 2024-07-28 RX ADMIN — OXYCODONE HYDROCHLORIDE 5 MG: 5 TABLET ORAL at 01:52

## 2024-07-28 RX ADMIN — PIPERACILLIN AND TAZOBACTAM 3.38 G: 3; .375 INJECTION, POWDER, FOR SOLUTION INTRAVENOUS at 00:46

## 2024-07-28 RX ADMIN — KETOROLAC TROMETHAMINE 30 MG: 30 INJECTION, SOLUTION INTRAMUSCULAR at 05:13

## 2024-07-28 RX ADMIN — ACETAMINOPHEN 975 MG: 325 TABLET, FILM COATED ORAL at 00:40

## 2024-07-28 RX ADMIN — FLUOXETINE 20 MG: 20 CAPSULE ORAL at 16:24

## 2024-07-28 RX ADMIN — OXYCODONE HYDROCHLORIDE 10 MG: 5 TABLET ORAL at 16:24

## 2024-07-28 RX ADMIN — ACETAMINOPHEN 975 MG: 325 TABLET, FILM COATED ORAL at 16:35

## 2024-07-28 RX ADMIN — DULOXETINE HYDROCHLORIDE 30 MG: 30 CAPSULE, DELAYED RELEASE PELLETS ORAL at 16:24

## 2024-07-28 RX ADMIN — SENNOSIDES AND DOCUSATE SODIUM 1 TABLET: 50; 8.6 TABLET ORAL at 00:40

## 2024-07-28 RX ADMIN — PIPERACILLIN AND TAZOBACTAM 3.38 G: 3; .375 INJECTION, POWDER, FOR SOLUTION INTRAVENOUS at 12:30

## 2024-07-28 RX ADMIN — PIPERACILLIN AND TAZOBACTAM 3.38 G: 3; .375 INJECTION, POWDER, FOR SOLUTION INTRAVENOUS at 19:45

## 2024-07-28 RX ADMIN — TOPIRAMATE 100 MG: 25 TABLET, FILM COATED ORAL at 20:42

## 2024-07-28 RX ADMIN — DIPHENHYDRAMINE HYDROCHLORIDE 25 MG: 25 CAPSULE ORAL at 21:04

## 2024-07-28 RX ADMIN — ACETAMINOPHEN 975 MG: 325 TABLET, FILM COATED ORAL at 08:13

## 2024-07-28 RX ADMIN — SENNOSIDES AND DOCUSATE SODIUM 1 TABLET: 50; 8.6 TABLET ORAL at 08:13

## 2024-07-28 ASSESSMENT — ACTIVITIES OF DAILY LIVING (ADL)
ADLS_ACUITY_SCORE: 25
ADLS_ACUITY_SCORE: 25
ADLS_ACUITY_SCORE: 24
ADLS_ACUITY_SCORE: 25
ADLS_ACUITY_SCORE: 25
ADLS_ACUITY_SCORE: 24
ADLS_ACUITY_SCORE: 25
ADLS_ACUITY_SCORE: 24
ADLS_ACUITY_SCORE: 25

## 2024-07-28 NOTE — OP NOTE
Operative note    Preoperative diagnosis: Acute appendicitis  Postoperative diagnosis: Gangrenous appendicitis without perforation or abscess    Procedure: Laparoscopic appendectomy    Surgeon: Sam Grimm    Anesthesia: GEA    Preoperative medication: Zosyn    Informed consent obtained    Procedure: Patient and procedure reconfirmed in the operating suite.  Following induction of anesthesia the patient was carefully positioned in the abdomen prepped and draped in usual fashion.  Open entry was gained to the abdomen through a supraumbilical site and a 5 mm cannula placed and abdomen insufflated to 15 mmHg of CO2 gas.  Patient was turned to Trendelenburg and left position.  Additional cannulas placed in the right upper quadrant and suprapubic positions.  Laparoscopic examination of the abdomen revealed no adhesions from prior , normal external female genitalia, normal-appearing stomach liver gallbladder superficial small and large bowel omentum and an acutely inflamed gangrenous appendix without perforation or abscess in the right lower quadrant.  Appendix was mobilized and a window created in the mesoappendix at its base.  Appendix was divided at the cecum with a FUAD load of Endo FUAD type stapler.  Mesentery was divided with a vascular load same.  Appendix was retrieved through the suprapubic site and an Endo retrieval bag.  Residual fluid was evacuated and pelvis and right lower quadrant.  Staple lines were inspected and hemostatically intact.  Cannula sites were locally infiltrated with quarter percent Marcaine with epinephrine for anesthetic effect.  Abdomen was exsufflated with CO2 gas after removing the right upper quadrant and suprapubic cannulas under direct vision reconfirming hemostasis.  Umbilical and suprapubic fascia was reapproximated with 0 Vicryl suture.  Skin reapproximated with 4-0 Monocryl.  Steri-Strips and Band-Aids applied.  Tolerated well.  Left the suite in satisfactory  condition.    EBL: 5 mL    Findings: As above    Specimens: Appendix

## 2024-07-28 NOTE — PLAN OF CARE
Goal Outcome Evaluation:      Plan of Care Reviewed With: patient    Overall Patient Progress: improvingOverall Patient Progress: improving    Significant events last 24 hours: POD #1 Lap Appe with Dr. Grimm; admitted for IV antibiotics post-operatively     Neuro: WNL; A/O x4; denies dizziness, lightheadedness, numbness or tingling  CV: WNL; BP and HR WNL  Resp: WNL; LCTA; on RA; sats stable; denies SOB; pillow for splinting; using IS; encouraged deep breaths overnight  GI: Lap sites x3 with steri-strips and band-aids CDI; abdomen soft, non-distended; denies flatus; denies nausea; tolerating liquids post-op  : WNL; voiding adequately without difficulty   Skin: lap sites x3; skin intact   Lines/Drains: PIV to left AC infusing LR@75 and antibiotics as ordered  Mobility: Up in room with SBA; turns self in bed     Safety:   activity supervised, clutter free environment maintained, increased rounding and observation, nonskid shoes/slippers when out of bed, patient and family education, room near nurse's station, room organization consistent, safety round/check completed, supervised activity; call light within reach; hourly rounding throughout the night.      Pain: pain in abdomen controlled with positioning/splinting; scheduled tylenol, oxycodone x1 and dilaudid x1 and ketorolac x1     Individualized Care Needs: chronic back pain exacerbated by hospital bed/positioning; heat pads for back    Expected Discharge Disposition: Home with , Zafar and children    Report Given: TITA Greene

## 2024-07-28 NOTE — OR NURSING
"Patient doing very well.  States pain is 3/10 and \"tolerable\" denies any nausea.  Dr. Grimm at bedside.  Family in to see patient as well.  Patient notes that her throat is more sore than her abdomen;  patient eating ice chips without difficulties.    "

## 2024-07-28 NOTE — PHARMACY-MEDICATION REGIMEN REVIEW
"Pharmacy Antimicrobial Stewardship Assessment     Current Antimicrobial Therapy:  Anti-infectives (From now, onward)      Start     Dose/Rate Route Frequency Ordered Stop    24 0000  piperacillin-tazobactam (ZOSYN) 3.375 g vial to attach to  mL bag        Note to Pharmacy: For [facility], [facility] and [facility]: For Zosyn-naive patients, use the \"Zosyn initial dose + extended infusion\" order panel.    3.375 g  over 30 Minutes Intravenous EVERY 6 HOURS 24 2339              Indication: Intra-abdominal infection    Days of Therapy: 2     Pertinent Labs:    Recent Labs   Lab Test 24  0510 24  1823 WBC 9.7 8.8       Temperature:  Temp (24hrs), Av.6  F (37  C), Min:97.4  F (36.3  C), Max:99.5  F (37.5  C)      Culture Results:   30-Day Micro Results       Collected Updated Procedure Result Status      2024 1833 2024 0657 Urine Culture [[id number]]   Urine, Clean Catch    Final result Component Value   Culture 50,000-100,000 CFU/mL Mixture of urogenital nam                     Recommendations/Interventions:  1. None    Sundeep Rosario, [facility]  2024    "

## 2024-07-28 NOTE — ANESTHESIA PROCEDURE NOTES
Airway         Procedure Start/Stop Times: 7/27/2024 9:36 PM  Staff -        CRNA: Katrin Lindo APRN CRNA       Performed By: CRNA  Consent for Airway        Urgency: emergent       Consent: No emergent situation. Verbal consent obtained. Written consent obtained.       Consent given by: patient       Risks and benefits: risks, benefits and alternatives were discussed       Patient identity confirmed: verbally with patient, arm band and hospital-assigned identification number  Indications and Patient Condition       Indications for airway management: antony-procedural       Induction type:RSI       Mask difficulty assessment: 0 - not attempted    Final Airway Details       Final airway type: endotracheal airway       Successful airway: ETT - single  Endotracheal Airway Details        ETT size (mm): 7.0       Cuffed: yes       Successful intubation technique: direct laryngoscopy       DL Blade Type: Josue 2       Grade View of Cords: 1       Adjucts: stylet       Position: Right       Measured from: lips       Secured at (cm): 20       Bite block used: None    Post intubation assessment        Placement verified by: capnometry, equal breath sounds and chest rise        Number of attempts at approach: 1       Number of other approaches attempted: 0       Secured with: commercial tube galvan and tape       Ease of procedure: easy       Dentition: Unchanged    Medication(s) Administered   Medication Administration Time: 7/27/2024 9:36 PM

## 2024-07-28 NOTE — PROGRESS NOTES
Postop day #1, daughter in attendance    Feels great    Offers no other complaints    PE: Afebrile  No cardiopulmonary embarrassment or extremity findings or change  Abdomen soft with minimal puncture site tenderness    Labs white count normal    A/P: Gangrenous appendicitis on IV antibiotics anticipate discharge tomorrow providing afebrile over the time.  Discussed.  Questions addressed, understood

## 2024-07-28 NOTE — OR NURSING
Patient ready for transfer to floor.  oLrrie RIVERS at bedside for report.  Patient to inpatient room.  Patient able to stand and ambulate to bathroom to void.  Care transferred over.

## 2024-07-28 NOTE — OR NURSING
PACU Respiratory Event Documentation     1) Episodes of Apnea greater than or equal to 10 seconds: 0    2) Bradypnea - less than 8 breaths per minute: 0    3) Pain score on 0 to 10 scale: 3-4    4) Pain-sedation mismatch (yes or no): no    5) Repeated 02 desaturation less than 90% (yes or no): no    Anesthesia notified? (yes or no): n/a    Any of the above events occuring repeatedly in separate 30 minute intervals may be considered recurrent PACU respiratory events.

## 2024-07-28 NOTE — BRIEF OP NOTE
[facility]    Brief Operative Note    Pre-operative diagnosis: Acute appendicitis with localized peritonitis, without perforation, abscess, or gangrene [K35.30]  Post-operative diagnosis gangrenous appendicitis    Procedure: APPENDECTOMY, LAPAROSCOPIC, N/A - Abdomen    Surgeon: Surgeons and Role:     * Sam Grimm MD - Primary  Anesthesia: General   Estimated Blood Loss: 5 mL    Drains: None  Specimens:   ID Type Source Tests Collected by Time Destination   1 :  Tissue Appendix SURGICAL PATHOLOGY EXAM Sam Grimm MD 7/27/2024 10:17 PM      Findings:   Acute gangrenous appendicitis with localized peritonitis and without perforation or abscess  Complications: None.  Implants: * No implants in log *

## 2024-07-28 NOTE — PLAN OF CARE
Patient A&Ox4, afebrile, vitally stable, BP's soft however Maps above 70. MD JOSE CARLOS ok'd to SL patient. Continues to receive intermit IV ABX. PRN oxy 10mg given TID. Tolerating diet, denies nausea. LAP sites x3 CDI. Patient up walking halls x2 today independently with patients children at side. Able to make all needs known.

## 2024-07-28 NOTE — H&P
Admission H&P    CC: Acute appendicitis    HPI: 54-year-old female companied by her 2 daughters and mother with onset periumbilical mid abdominal pain last evening now progressed to right lower quadrant accompanied by chills.  Hurts to walk move left.  No fevers sweats rigors.  No travel history dietary indiscretions or others ill at home.  No jaundice acholic stools or biliuria.  Prior abdominal surgeries 2 C-sections.  No family history of appendicitis.  No nausea but anorexic and has not eaten since yesterday.  Some sips of liquids this afternoon.  No diarrhea.  Pain radiates to her back, chronic back pain    PMH:  1.  Migraines  2.  Chronic back pain  A through H otherwise unremarkable with the exceptions above  No cancers radiation or chemotherapy  No medicinal or or latex allergies  No prescription medications  No tobacco or alcohol or illicit drug use  No blood transfusions  Right-hand-dominant    SH: [place] [address] with her daughter  with 1 child at home    FH: Noncontributory    ROS: Otherwise unremarkable on review all systems head to toe with the exceptions above    PE: Afebrile vital signs stable appears quite uncomfortable but skin warm and dry color good membranes moist  Chest clear bilaterally  Heart regular rate and rhythm without audible murmur  Abdomen soft with focal tenderness at McBurney's point no appreciable mass or organomegaly no hernias  No acute extremity findings or change    Lab:  White count 8.8 with 67 segs  A gap 14  UA okay    CT and report personally reviewed    A/P: History exam and CT scan all suggestive of acute appendicitis.  Discussed at length management alternatives surgical and nonsurgical and their inherent risks limitations potential benefit as well as goals.  Questions addressed to she and her family satisfaction.  Cognizant of the forementioned her expressed wishes to proceed with diagnostic laparoscopy appendectomy to proceed as  indicated

## 2024-07-28 NOTE — ANESTHESIA POSTPROCEDURE EVALUATION
Patient: Dilcia Santillan    Procedure: Procedure(s):  APPENDECTOMY, LAPAROSCOPIC       Anesthesia Type:  General    Note:  Disposition: Inpatient   Postop Pain Control: Uneventful            Sign Out: Well controlled pain   PONV: No   Neuro/Psych: Uneventful            Sign Out: Acceptable/Baseline neuro status   Airway/Respiratory: Uneventful            Sign Out: Acceptable/Baseline resp. status   CV/Hemodynamics: Uneventful            Sign Out: Acceptable CV status; No obvious hypovolemia; No obvious fluid overload   Other NRE: NONE   DID A NON-ROUTINE EVENT OCCUR? No       Last vitals:  Vitals Value Taken Time   /71 07/27/24 2320   Temp 98.1  F (36.7  C) 07/27/24 2315   Pulse 98 07/27/24 2320   Resp 7 07/27/24 2320   SpO2 98 % 07/27/24 2320   Vitals shown include unfiled device data.    Electronically Signed By: YOSSI Marte CRNA  July 27, 2024  11:21 PM

## 2024-07-28 NOTE — ANESTHESIA PREPROCEDURE EVALUATION
Anesthesia Pre-Procedure Evaluation    Patient: Dilcia Santillan   MRN: [id number] : 1970        Procedure :           Past Medical History:   Diagnosis Date    Migraines     headaches 3-4 times yearly beginning in , prior to that daily      No past surgical history on file.   Allergies   Allergen Reactions    Food Swelling     Vanilla Blackberry Dannon Yogurt--felt throat swelling per Dr. Navarrete note in Care Everywhere    Morphine Sulfate Nausea    Nortriptyline      Weight gain        Social History     Tobacco Use    Smoking status: Never    Smokeless tobacco: Never   Substance Use Topics    Alcohol use: No      Wt Readings from Last 1 Encounters:   24 68.6 kg (151 lb 3.8 oz)        Anesthesia Evaluation   Pt has had prior anesthetic. Type: General.    No history of anesthetic complications       ROS/MED HX  ENT/Pulmonary:     (+)     ISSAC risk factors, snores loudly,                                  Neurologic: Comment: History of closed Head injury with concussion   Recent documented memory loss over a few years in Navarrete note 2024  Per patient seeing neurologist in one month    (+)      migraines,                          Cardiovascular:  - neg cardiovascular ROS     METS/Exercise Tolerance: >4 METS    Hematologic:  - neg hematologic  ROS     Musculoskeletal: Comment: Numerous back surgeries      GI/Hepatic: Comment: Denies nausea and vomiting  Per patient can't eat because it hurts too much    (+)         appendicitis,           Renal/Genitourinary:  - neg Renal ROS     Endo:  - neg endo ROS     Psychiatric/Substance Use:     (+)    H/O chronic opiod use  (rare per patient).     Infectious Disease:  - neg infectious disease ROS     Malignancy:  - neg malignancy ROS     Other: Comment: No periods - neg other ROS    (+)  , H/O Chronic Pain,         Physical Exam    Airway        Mallampati: II   TM distance: > 3 FB   Neck ROM: full   Mouth opening: > 3 cm    Respiratory Devices and Support    "      Dental       (+) Modest Abnormalities - crowns, retainers, 1 or 2 missing teeth      Cardiovascular   cardiovascular exam normal       Rhythm and rate: regular and normal     Pulmonary   pulmonary exam normal        breath sounds clear to auscultation           OUTSIDE LABS:  CBC:   Lab Results   Component Value Date    WBC 8.8 07/27/2024    WBC 6.8 05/20/2021    HGB 14.0 07/27/2024    HGB 14.5 05/20/2021    HCT 41.4 07/27/2024    HCT 43.2 05/20/2021     07/27/2024     05/20/2021     BMP:   Lab Results   Component Value Date     07/27/2024     05/20/2021    POTASSIUM 3.5 07/27/2024    POTASSIUM 3.3 (L) 05/20/2021    CHLORIDE 104 07/27/2024    CHLORIDE 111 (H) 05/20/2021    CO2 19 (L) 07/27/2024    CO2 22 05/20/2021    BUN 5.8 (L) 07/27/2024    BUN 6 (L) 05/20/2021    CR 0.82 07/27/2024    CR 0.78 05/20/2021    GLC 92 07/27/2024     (H) 05/20/2021     COAGS: No results found for: \"PTT\", \"INR\", \"FIBR\"  POC:   Lab Results   Component Value Date    HCG Negative 08/25/2016     HEPATIC:   Lab Results   Component Value Date    ALBUMIN 4.8 07/27/2024    PROTTOTAL 7.6 07/27/2024    ALT 32 07/27/2024    AST 18 07/27/2024    ALKPHOS 91 07/27/2024    BILITOTAL 0.4 07/27/2024     OTHER:   Lab Results   Component Value Date    LACT 1.7 03/29/2020    JUAN 9.5 07/27/2024    LIPASE 19 07/27/2024    .0 (H) 04/03/2020       Anesthesia Plan    ASA Status:  2, emergent    NPO Status:  NPO Appropriate (sip of water at 1pm)    Anesthesia Type: General.     - Airway: ETT   Induction: RSI.           Consents    Anesthesia Plan(s) and associated risks, benefits, and realistic alternatives discussed. Questions answered and patient/representative(s) expressed understanding.     - Discussed:     - Discussed with:  Patient            Postoperative Care            Comments:    Other Comments: Discussed risks and benefits with patient for general anesthesia including sore throat, nausea, vomiting, " aspiration, dental damage, loss of airway, CV complications, stroke, MI, death. Pt wishes to proceed.            YOSSI Marte CRNA    I have reviewed the pertinent notes and labs in the chart from the past 30 days and (re)examined the patient.  Any updates or changes from those notes are reflected in this note.             # Drug Induced Platelet Defect: home medication list includes an antiplatelet medication

## 2024-07-28 NOTE — ANESTHESIA CARE TRANSFER NOTE
Patient: Dilcia Santillan    Procedure: Procedure(s):  APPENDECTOMY, LAPAROSCOPIC       Diagnosis: Acute appendicitis with localized peritonitis, without perforation, abscess, or gangrene [K35.30]  Diagnosis Additional Information: No value filed.    Anesthesia Type:   General     Note:    Oropharynx: oropharynx clear of all foreign objects and spontaneously breathing  Level of Consciousness: awake  Oxygen Supplementation: nasal cannula  Level of Supplemental Oxygen (L/min / FiO2): 2  Independent Airway: airway patency satisfactory and stable  Dentition: dentition unchanged  Vital Signs Stable: post-procedure vital signs reviewed and stable  Report to RN Given: handoff report given  Patient transferred to: PACU  Comments: Transport to ICU uneventful. Patient restful, denies pain at this time. VSS  Handoff Report: Identifed the Patient, Identified the Reponsible Provider, Reviewed the pertinent medical history, Discussed the surgical course, Reviewed Intra-OP anesthesia mangement and issues during anesthesia, Set expectations for post-procedure period and Allowed opportunity for questions and acknowledgement of understanding      Vitals:  Vitals Value Taken Time   /78 07/27/24 2253   Temp     Pulse 102 07/27/24 2254   Resp 16 07/27/24 2254   SpO2 99 % 07/27/24 2254   Vitals shown include unfiled device data.    Electronically Signed By: YOSSI Marte CRNA  July 27, 2024  10:55 PM

## 2024-07-28 NOTE — PROGRESS NOTES
[facility] Inpatient Admission Note:    Patient admitted to 3112/3112-1 at approximately 2330 via cart accompanied by spouse and mother from surgery . Report received from TITA Briggs in SBAR format at 2330 via face to face in room. Patient ambulated to bed via self.. Patient is alert and oriented X 3, reports pain; rates at 4 on 0-10 scale.  Patient oriented to room, unit, hourly rounding, and plan of care. Explained admission packet and patient handbook with patient bill of rights brochure. Will continue to monitor and document as needed.     Inpatient Nursing criteria listed below was met:    Health care directives status obtained and documented: No    Patient identifies a surrogate decision maker: Yes If yes, who:, Zafar Contact Information:[phone]     If initial lactic acid greater than 2.0, repeat lactic acid drawn within one hour of arrival to unit: NA.    Clergy visit ordered if patient requests: N/A    Skin issues/needs documented: Yes    Isolation Patient: no Education given, correct sign in place and documentation row added to PCS:  No    Fall Prevention Yes: Care plan updated, education given and documented, sticker and magnet in place: Yes    Care Plan initiated: Yes    Education Documented (including assessment): Yes    Patient has discharge needs : No

## 2024-07-29 VITALS
TEMPERATURE: 98 F | DIASTOLIC BLOOD PRESSURE: 59 MMHG | RESPIRATION RATE: 16 BRPM | SYSTOLIC BLOOD PRESSURE: 98 MMHG | BODY MASS INDEX: 24.31 KG/M2 | WEIGHT: 151.24 LBS | HEIGHT: 66 IN | OXYGEN SATURATION: 98 % | HEART RATE: 75 BPM

## 2024-07-29 PROCEDURE — 250N000013 HC RX MED GY IP 250 OP 250 PS 637: Performed by: SURGERY

## 2024-07-29 PROCEDURE — 999N000157 HC STATISTIC RCP TIME EA 10 MIN

## 2024-07-29 PROCEDURE — 250N000011 HC RX IP 250 OP 636: Performed by: SURGERY

## 2024-07-29 RX ORDER — TRAMADOL HYDROCHLORIDE 50 MG/1
100 TABLET ORAL EVERY 6 HOURS PRN
Status: ON HOLD | COMMUNITY
Start: 2024-05-23 | End: 2024-07-29

## 2024-07-29 RX ORDER — HYDROCODONE BITARTRATE AND ACETAMINOPHEN 5; 325 MG/1; MG/1
1 TABLET ORAL EVERY 6 HOURS PRN
Qty: 18 TABLET | Refills: 0 | Status: SHIPPED | OUTPATIENT
Start: 2024-07-29 | End: 2024-08-01

## 2024-07-29 RX ADMIN — OXYCODONE HYDROCHLORIDE 10 MG: 5 TABLET ORAL at 07:40

## 2024-07-29 RX ADMIN — OXYCODONE HYDROCHLORIDE 10 MG: 5 TABLET ORAL at 00:09

## 2024-07-29 RX ADMIN — PIPERACILLIN AND TAZOBACTAM 3.38 G: 3; .375 INJECTION, POWDER, FOR SOLUTION INTRAVENOUS at 06:24

## 2024-07-29 RX ADMIN — DULOXETINE HYDROCHLORIDE 30 MG: 30 CAPSULE, DELAYED RELEASE PELLETS ORAL at 09:05

## 2024-07-29 RX ADMIN — PIPERACILLIN AND TAZOBACTAM 3.38 G: 3; .375 INJECTION, POWDER, FOR SOLUTION INTRAVENOUS at 00:11

## 2024-07-29 RX ADMIN — DIPHENHYDRAMINE HYDROCHLORIDE 50 MG: 25 CAPSULE ORAL at 02:39

## 2024-07-29 RX ADMIN — ACETAMINOPHEN 975 MG: 325 TABLET, FILM COATED ORAL at 07:40

## 2024-07-29 RX ADMIN — ACETAMINOPHEN 975 MG: 325 TABLET, FILM COATED ORAL at 00:09

## 2024-07-29 RX ADMIN — FLUOXETINE 20 MG: 20 CAPSULE ORAL at 09:05

## 2024-07-29 ASSESSMENT — ACTIVITIES OF DAILY LIVING (ADL)
ADLS_ACUITY_SCORE: 22
ADLS_ACUITY_SCORE: 22
ADLS_ACUITY_SCORE: 24
ADLS_ACUITY_SCORE: 24
ADLS_ACUITY_SCORE: 22
ADLS_ACUITY_SCORE: 24
ADLS_ACUITY_SCORE: 24
ADLS_ACUITY_SCORE: 22
ADLS_ACUITY_SCORE: 24
ADLS_ACUITY_SCORE: 22
ADLS_ACUITY_SCORE: 22
ADLS_ACUITY_SCORE: 24
ADLS_ACUITY_SCORE: 23

## 2024-07-29 NOTE — DISCHARGE SUMMARY
INPATIENT DISCHARGE SUMMARY  7/29/2024    Patient'S Name: Dilcia Santillan    Admitting Physician of Record: [facility]    Discharging Provider:  Kathia Hensley NP    Date of admission: 7/27/2024     Date of discharge: 7/29/2024    Admitting diagnosis: Acute appendicitis with localized peritonitis, without perforation, abscess, or gangrene [K35.30]  Acute appendicitis with generalized peritonitis without gangrene, perforation, or abscess [K35.200]    Discharge diagnosis: Same    Procedures: Procedure(s):  APPENDECTOMY, LAPAROSCOPIC    Consultants: None    Hospital course: The patient was admitted to the hospital and taken to the operating room and underwent an Procedure(s):  APPENDECTOMY, LAPAROSCOPIC.  The patient tolerated the procedure(s) well and was transferred to the [facility].  Her postoperative course has been completely unremarkable.  At the time of discharge she is eating a Regular diet, is having good pain control on oral medications, and is passing gas.  The patient will be discharged home in good condition.    Discharge instructions include:    Patient will be discharged to Home   Diet:   Active Diet and Nourishment Order   Procedures    Advance Diet as Tolerated: Regular Diet Adult      Activity : no liting over 10 pounds for 6 weeks   Follow-up:     The patient will follow up with her primary care provider in 1 weeks.      The patient will follow up with me in 2 weeks.   Medications include:    All prior medications    Oxycodone (Norco) for pain control.

## 2024-07-29 NOTE — MEDICATION SCRIBE - ADMISSION MEDICATION HISTORY
Medication Scribe Admission Medication History    Admission medication history is complete. The information provided in this note is only as accurate as the sources available at the time of the update.    Information Source(s): Patient and CareEverywhere/SureScripts via in-person    Pertinent Information:   Patient manages her own medications at home and is a good historian.     Changes made to PTA medication list:  Added: B12  Deleted: Tramadol, Axert, ASA, Doxycycline, Norco, Ambien CR - therapy complete  Changed: Updated time of day patient takes medications    Allergies reviewed with patient and updates made in EHR: yes    Medication History Completed By: Cassandra Zheng 7/29/2024 8:04 AM    PTA Med List   Medication Sig Last Dose    cyclobenzaprine (FLEXERIL) 10 MG tablet Take 0.5-1 tablets (5-10 mg) by mouth 3 times daily as needed for muscle spasms Past Week at prn    DULoxetine (CYMBALTA) 30 MG capsule Take 30 mg by mouth every evening 7/26/2024 at 2100    FLUoxetine (PROZAC) 20 MG capsule Take 20 mg by mouth every evening 7/26/2024 at 2100    topiramate (TOPAMAX) 100 MG tablet Take 100 mg by mouth 2 times daily  7/26/2024 at 2100    vitamin B-12 (CYANOCOBALAMIN) 1000 MCG tablet Take 1,000 mcg by mouth every evening 7/26/2024 at 2100    vitamin D3 (CHOLECALCIFEROL) 2000 units tablet Take 2,000 Units by mouth every evening 7/26/2024 at 2100

## 2024-07-29 NOTE — PLAN OF CARE
"Patient discharged at 9:50 AM via ambulation accompanied by daughter and staff. Prescriptions sent to patients preferred pharmacy. All belongings sent with patient.     Discharge instructions reviewed with patient. Listed belongings gathered and returned to patient.     Patient discharged to home.     Surgical Patient   Surgical Procedures during stay: Laparoscopic appendectomy   Did patient receive discharge instruction on wound care and recognition of infection symptoms? Yes    MISC  Follow up appointment made:  Yes  Home medications returned to patient: N/A  Patient reports pain was well managed at discharge: Yes    Reason for hospital stay:  Acute appendicitis with perforation  Living situation PTA: Home with   Most recent vitals: BP 98/59 (BP Location: Left arm, Patient Position: Semi-Yan's, Cuff Size: Adult Regular)   Pulse 75   Temp 98  F (36.7  C) (Tympanic)   Resp 16   Ht 1.676 m (5' 5.98\")   Wt 68.6 kg (151 lb 3.8 oz)   SpO2 98%   BMI 24.42 kg/m      Pain Management:  Patient reported abdominal pain between 5-8/10. Administered scheduled tylenol and PRN oxycodone as requested. Upon reassessment, patient reported adequate relief.   LOC:  A&O x 4  Cardiac:  Apical pulse regular  Respiratory:  Maintaining sats on room air, lung sounds clear and equal bilaterally   GI:  Abdomen soft and tender to palpation. Patient   :  Voiding spontaneously without difficulty  Skin Issues:  Laparoscopic sites x 3 to abdomen, dressings CDI.     IVF:  IV removed prior to discharge  ABX:  PO Augmentin ordered for discharge    Nutrition: Regular diet, good appetite   ADL's:  Independent   Ambulation: Independent in room  Safety:  Patient remained free from falls/injury prior to discharge.    7/29/2024  9:50 AM  Kimberly Del Toro RN    "

## 2024-07-29 NOTE — PROGRESS NOTES
"Goal Outcome Evaluation:       Plan of Care Reviewed With: patient     Overall Patient Progress: improvingOverall Patient Progress: improving     Significant events last 24 hours: POD #2 Lap Appe with Dr. Grimm; admitted for IV antibiotics post-operatively; afebrile overnight; BP soft: asymptomatic, patient states this is near her normal BP and that both her and her mother have baseline hypotension     Neuro: WNL; A/O x4; denies dizziness, lightheadedness, numbness or tingling  CV: WNL; BP and HR WNL  Resp: WNL; LCTA; on RA; sats stable; denies SOB; pillow for splinting; using IS; encouraged deep breaths overnight  GI: Lap sites x3 with steri-strips and band-aids CDI; abdomen soft, non-distended; positive flatus; denies nausea; tolerating regular diet  : WNL; voiding adequately without difficulty   Skin: lap sites x3; skin intact   Lines/Drains: PIV to left AC saline locked; antibiotics infused as ordered  Mobility: Up in room with SBA; turns self in bed     Safety:   activity supervised, clutter free environment maintained, increased rounding and observation, nonskid shoes/slippers when out of bed, patient and family education, room near nurse's station, room organization consistent, safety round/check completed, supervised activity; call light within reach; hourly rounding throughout the night.      Pain: pain in abdomen controlled with positioning/splinting; scheduled tylenol, oxycodone x1     Individualized Care Needs: chronic back pain exacerbated by hospital bed/positioning; heat pads for back; PRN benadryl x2 overnight for itching     Expected Discharge Disposition: Home with , Zafar and children     Report Given: TITA Harris    BP 94/56 (BP Location: Left arm, Patient Position: Supine, Cuff Size: Adult Regular)   Pulse 82   Temp 98.3  F (36.8  C) (Tympanic)   Resp 14   Ht 1.676 m (5' 5.98\")   Wt 68.6 kg (151 lb 3.8 oz)   SpO2 96%   BMI 24.42 kg/m     "

## 2024-07-29 NOTE — DISCHARGE INSTRUCTIONS
You have a hospital follow up at [facility] in [place] on Wednesday, August 7th at 11:45am. Please call [phone] if you need to reschedule.

## 2024-07-29 NOTE — PROGRESS NOTES
INPATIENT ROUNDING NOTE  7/29/2024    Patient: Dilcia Santillan    Physician of Record: [facility]    Admitting diagnosis: Acute appendicitis with localized peritonitis, without perforation, abscess, or gangrene [K35.30]  Acute appendicitis with generalized peritonitis without gangrene, perforation, or abscess [K35.200]    Procedure(s):  APPENDECTOMY, LAPAROSCOPIC     POD: 2 Days Post-Op    Current Diet: Regular    CURRENT MEDICATIONS:  Continuous Medications:  Current Facility-Administered Medications   Medication Dose Route Frequency Provider Last Rate Last Admin    [START ON 7/30/2024] acetaminophen (TYLENOL) tablet 650 mg  650 mg Oral Q4H PRN Sam Grimm MD        acetaminophen (TYLENOL) tablet 975 mg  975 mg Oral Q8H Sam Grimm MD   975 mg at 07/29/24 0740    diphenhydrAMINE (BENADRYL) capsule 25-50 mg  25-50 mg Oral Q6H PRN Sam Grimm MD   50 mg at 07/29/24 0239    DULoxetine (CYMBALTA) DR capsule 30 mg  30 mg Oral Daily Sam Grimm MD   30 mg at 07/28/24 1624    FLUoxetine (PROzac) capsule 20 mg  20 mg Oral Daily Sam Grimm MD   20 mg at 07/28/24 1624    [START ON 8/2/2024] ibuprofen (ADVIL/MOTRIN) tablet 600 mg  600 mg Oral Q6H PRN Sam Grimm MD        lactated ringers infusion   Intravenous Continuous Sam Grimm MD 75 mL/hr at 07/27/24 2357 New Bag at 07/27/24 2357    lidocaine (LMX4) cream   Topical Q1H PRN Sam Grimm MD        lidocaine 1 % 0.1-1 mL  0.1-1 mL Other Q1H PRN Sam Grimm MD        naloxone (NARCAN) injection 0.2 mg  0.2 mg Intravenous Q2 Min PRN Sam Grimm MD        Or    naloxone (NARCAN) injection 0.4 mg  0.4 mg Intravenous Q2 Min PRN Sam Grimm MD        Or    naloxone (NARCAN) injection 0.2 mg  0.2 mg Intramuscular Q2 Min PRN Sam Grimm MD        Or    naloxone (NARCAN) injection 0.4 mg  0.4 mg Intramuscular Q2 Min PRN Sam Grimm MD        ondansetron (ZOFRAN ODT) ODT tab 4 mg  4 mg Oral Q6H PRN Sam Grimm MD         Or    ondansetron (ZOFRAN) injection 4 mg  4 mg Intravenous Q6H PRN Sam Grimm MD        oxyCODONE (ROXICODONE) tablet 5 mg  5 mg Oral Q4H PRN Sam Grimm MD   5 mg at 07/28/24 0152    Or    oxyCODONE (ROXICODONE) tablet 10 mg  10 mg Oral Q4H PRN Sam Grimm MD   10 mg at 07/29/24 0740    piperacillin-tazobactam (ZOSYN) 3.375 g vial to attach to  mL bag  3.375 g Intravenous Q6H Sam Grimm MD   3.375 g at 07/29/24 0624    sodium chloride (PF) 0.9% PF flush 3 mL  3 mL Intravenous Q1H PRN Sam Grimm MD        sodium chloride (PF) 0.9% PF flush 3 mL  3 mL Intravenous Q8H Sam Grimm MD   3 mL at 07/28/24 1637    sodium chloride (PF) 0.9% PF flush 3 mL  3 mL Intravenous Q1H PRN Sam Grimm MD        sodium chloride (PF) 0.9% PF flush 3 mL  3 mL Intracatheter Q8H Sam Grimm MD   3 mL at 07/29/24 0749    sodium chloride (PF) 0.9% PF flush 3 mL  3 mL Intracatheter q1 min prn Sam Grimm MD        topiramate (TOPAMAX) tablet 100 mg  100 mg Oral BID Sam Grimm MD   100 mg at 07/28/24 2042       Scheduled Medications:  Current Facility-Administered Medications   Medication Dose Route Frequency Provider Last Rate Last Admin    [START ON 7/30/2024] acetaminophen (TYLENOL) tablet 650 mg  650 mg Oral Q4H PRN Sam Grimm MD        acetaminophen (TYLENOL) tablet 975 mg  975 mg Oral Q8H Sam Grimm MD   975 mg at 07/29/24 0740    diphenhydrAMINE (BENADRYL) capsule 25-50 mg  25-50 mg Oral Q6H PRN Sam Grimm MD   50 mg at 07/29/24 0239    DULoxetine (CYMBALTA) DR capsule 30 mg  30 mg Oral Daily Sam Grimm MD   30 mg at 07/28/24 1624    FLUoxetine (PROzac) capsule 20 mg  20 mg Oral Daily Sam Grimm MD   20 mg at 07/28/24 1624    [START ON 8/2/2024] ibuprofen (ADVIL/MOTRIN) tablet 600 mg  600 mg Oral Q6H PRN Sam Grimm MD        lactated ringers infusion   Intravenous Continuous Sam Grimm MD 75 mL/hr at 07/27/24 9644 New Bag at  07/27/24 2357    lidocaine (LMX4) cream   Topical Q1H PRN Sam Grimm MD        lidocaine 1 % 0.1-1 mL  0.1-1 mL Other Q1H PRN Sam Grimm MD        naloxone (NARCAN) injection 0.2 mg  0.2 mg Intravenous Q2 Min PRN Sam Grimm MD        Or    naloxone (NARCAN) injection 0.4 mg  0.4 mg Intravenous Q2 Min PRN Sam Grimm MD        Or    naloxone (NARCAN) injection 0.2 mg  0.2 mg Intramuscular Q2 Min PRN Sam Grimm MD        Or    naloxone (NARCAN) injection 0.4 mg  0.4 mg Intramuscular Q2 Min PRN Sam Grimm MD        ondansetron (ZOFRAN ODT) ODT tab 4 mg  4 mg Oral Q6H PRN Sam Grimm MD        Or    ondansetron (ZOFRAN) injection 4 mg  4 mg Intravenous Q6H PRN Sam Grimm MD        oxyCODONE (ROXICODONE) tablet 5 mg  5 mg Oral Q4H PRN Sam Grimm MD   5 mg at 07/28/24 0152    Or    oxyCODONE (ROXICODONE) tablet 10 mg  10 mg Oral Q4H PRN Sam Grimm MD   10 mg at 07/29/24 0740    piperacillin-tazobactam (ZOSYN) 3.375 g vial to attach to  mL bag  3.375 g Intravenous Q6H Sam Grimm MD   3.375 g at 07/29/24 0624    sodium chloride (PF) 0.9% PF flush 3 mL  3 mL Intravenous Q1H PRN Sam Grimm MD        sodium chloride (PF) 0.9% PF flush 3 mL  3 mL Intravenous Q8H Sam Grimm MD   3 mL at 07/28/24 1637    sodium chloride (PF) 0.9% PF flush 3 mL  3 mL Intravenous Q1H PRN Sam Grimm MD        sodium chloride (PF) 0.9% PF flush 3 mL  3 mL Intracatheter Q8H Sam Grimm MD   3 mL at 07/29/24 0749    sodium chloride (PF) 0.9% PF flush 3 mL  3 mL Intracatheter q1 min prn Sam Grimm MD        topiramate (TOPAMAX) tablet 100 mg  100 mg Oral BID Sam Grimm MD   100 mg at 07/28/24 2042       PRN Medications:  Current Facility-Administered Medications   Medication Dose Route Frequency Provider Last Rate Last Admin    [START ON 7/30/2024] acetaminophen (TYLENOL) tablet 650 mg  650 mg Oral Q4H PRN Sam Grimm MD        acetaminophen  (TYLENOL) tablet 975 mg  975 mg Oral Q8H Sam Grimm MD   975 mg at 07/29/24 0740    diphenhydrAMINE (BENADRYL) capsule 25-50 mg  25-50 mg Oral Q6H PRN Sam Grimm MD   50 mg at 07/29/24 0239    DULoxetine (CYMBALTA) DR capsule 30 mg  30 mg Oral Daily Sam Grimm MD   30 mg at 07/28/24 1624    FLUoxetine (PROzac) capsule 20 mg  20 mg Oral Daily Sam Grimm MD   20 mg at 07/28/24 1624    [START ON 8/2/2024] ibuprofen (ADVIL/MOTRIN) tablet 600 mg  600 mg Oral Q6H PRN Sam Grimm MD        lactated ringers infusion   Intravenous Continuous Sam Grimm MD 75 mL/hr at 07/27/24 2357 New Bag at 07/27/24 2357    lidocaine (LMX4) cream   Topical Q1H PRN Sam Grimm MD        lidocaine 1 % 0.1-1 mL  0.1-1 mL Other Q1H PRN Sam Grimm MD        naloxone (NARCAN) injection 0.2 mg  0.2 mg Intravenous Q2 Min PRN Sam Grimm MD        Or    naloxone (NARCAN) injection 0.4 mg  0.4 mg Intravenous Q2 Min PRN Sam Grimm MD        Or    naloxone (NARCAN) injection 0.2 mg  0.2 mg Intramuscular Q2 Min PRN Sam Grimm MD        Or    naloxone (NARCAN) injection 0.4 mg  0.4 mg Intramuscular Q2 Min PRN Sam Grimm MD        ondansetron (ZOFRAN ODT) ODT tab 4 mg  4 mg Oral Q6H PRN Sam Grimm MD        Or    ondansetron (ZOFRAN) injection 4 mg  4 mg Intravenous Q6H PRN Sam Grimm MD        oxyCODONE (ROXICODONE) tablet 5 mg  5 mg Oral Q4H PRN Sam Grimm MD   5 mg at 07/28/24 0152    Or    oxyCODONE (ROXICODONE) tablet 10 mg  10 mg Oral Q4H PRN Sam Grimm MD   10 mg at 07/29/24 0740    piperacillin-tazobactam (ZOSYN) 3.375 g vial to attach to  mL bag  3.375 g Intravenous Q6H Sam Grimm MD   3.375 g at 07/29/24 0624    sodium chloride (PF) 0.9% PF flush 3 mL  3 mL Intravenous Q1H PRN Sam Grimm MD        sodium chloride (PF) 0.9% PF flush 3 mL  3 mL Intravenous Q8H Sam Grimm MD   3 mL at 07/28/24 1637    sodium chloride (PF) 0.9% PF  "flush 3 mL  3 mL Intravenous Q1H PRN Sam Grimm MD        sodium chloride (PF) 0.9% PF flush 3 mL  3 mL Intracatheter Q8H Sam Grimm MD   3 mL at 07/29/24 0749    sodium chloride (PF) 0.9% PF flush 3 mL  3 mL Intracatheter q1 min prn Sam Grimm MD        topiramate (TOPAMAX) tablet 100 mg  100 mg Oral BID Sam Grimm MD   100 mg at 07/28/24 2042       SUBJECTIVE:   Nausea: No. Vomiting: No. Fever: No. Chills: No. Excessive burping: No. Flatus: Yes. BM: No. Pain control: good. Tolerating current diet: Yes.      PHYSICAL EXAM:   Vital signs: BP 98/59 (BP Location: Left arm, Patient Position: Semi-Yan's, Cuff Size: Adult Regular)   Pulse 75   Temp 98  F (36.7  C) (Tympanic)   Resp 16   Ht 1.676 m (5' 5.98\")   Wt 68.6 kg (151 lb 3.8 oz)   SpO2 98%   BMI 24.42 kg/m     BMI: Body mass index is 24.42 kg/m .   General: Normal, healthy, cooperative, in no acute distress, alert   Lungs: respirations are non-labored   Abdominal: non-distended   Wound: Closed wound. Clean, dry and intact. Healing well.   Extremities: No cyanosis, clubbing or edema noted bilaterally in Upper and Lower Extremities   Neurological: without deficit    INPUT/OUTPUT:      Intake/Output Summary (Last 24 hours) at 7/29/2024 0754  Last data filed at 7/29/2024 0657  Gross per 24 hour   Intake 760 ml   Output --   Net 760 ml       I/O last 3 completed shifts:  In: 760 [P.O.:390; I.V.:370]  Out: -     LABS:    Last CBC Rrsults:   Recent Labs   Lab Test 07/28/24  0510 07/27/24  1823 05/20/21  1301   WBC 9.7 8.8 6.8   RBC 4.04 4.58 4.81   HGB 12.6 14.0 14.5   HCT 36.6 41.4 43.2   MCV 91 90 90   MCH 31.2 30.6 30.1   MCHC 34.4 33.8 33.6   RDW 12.1 12.2 12.3    245 310       Last Comprehensive Metabolic panel:  Recent Labs   Lab Test 07/28/24  0556 07/27/24  1823 05/20/21  1301 04/03/20  0549 04/02/20  0454   NA  --  137 141 138 137   POTASSIUM  --  3.5 3.3* 3.4 3.1*   CHLORIDE  --  104 111* 110* 109   CO2  --  19* 22 21 " 21   ANIONGAP  --  14 8 7 7   * 92 109* 102* 107*   BUN  --  5.8* 6* 5* 4*   CR  --  0.82 0.78 0.54 0.52   GFRESTIMATED  --  85 88 >90 >90   JUAN  --  9.5 8.9 8.6 8.3*   BILITOTAL  --  0.4  --  0.4 0.5   ALKPHOS  --  91  --  181* 178*   ALT  --  32  --  166* 179*   AST  --  18  --  113* 175*       Recent Labs   Lab Test 07/27/24  1823 04/03/20  0549 04/02/20  0454   ALBUMIN 4.8 2.3* 2.2*       ASSESSMENT:    2 Days Post-Op from Procedure(s):  APPENDECTOMY, LAPAROSCOPIC.    PLAN:   Discharge home today

## 2024-07-31 LAB
PATH REPORT.COMMENTS IMP SPEC: NORMAL
PATH REPORT.FINAL DX SPEC: NORMAL
PATH REPORT.GROSS SPEC: NORMAL
PATH REPORT.MICROSCOPIC SPEC OTHER STN: NORMAL
PATH REPORT.RELEVANT HX SPEC: NORMAL
PHOTO IMAGE: NORMAL

## 2024-08-05 PROBLEM — M77.01 MEDIAL EPICONDYLITIS OF BOTH ELBOWS: Status: ACTIVE | Noted: 2021-12-23

## 2024-08-05 PROBLEM — M77.02 MEDIAL EPICONDYLITIS OF BOTH ELBOWS: Status: ACTIVE | Noted: 2021-12-23

## 2024-08-05 PROBLEM — F45.42 PAIN DISORDER ASSOCIATED WITH PSYCHOLOGICAL FACTORS AND MEDICAL CONDITION: Status: ACTIVE | Noted: 2017-11-01

## 2024-08-05 PROBLEM — E53.8 VITAMIN B12 DEFICIENCY: Status: ACTIVE | Noted: 2018-04-06

## 2024-08-05 PROBLEM — E55.9 VITAMIN D DEFICIENCY: Status: ACTIVE | Noted: 2018-04-06

## 2024-08-05 PROBLEM — G89.29 CHRONIC PAIN OF LEFT KNEE: Status: ACTIVE | Noted: 2023-01-17

## 2024-08-05 PROBLEM — M25.50 LARGE JOINT ARTHRALGIA OF MULTIPLE SITES: Status: ACTIVE | Noted: 2020-03-18

## 2024-08-05 PROBLEM — M25.562 CHRONIC PAIN OF LEFT KNEE: Status: ACTIVE | Noted: 2023-01-17

## 2024-08-13 ENCOUNTER — OFFICE VISIT (OUTPATIENT)
Dept: SURGERY | Facility: OTHER | Age: 54
End: 2024-08-13
Attending: NURSE PRACTITIONER
Payer: COMMERCIAL

## 2024-08-13 VITALS
DIASTOLIC BLOOD PRESSURE: 64 MMHG | HEIGHT: 67 IN | TEMPERATURE: 98.3 F | SYSTOLIC BLOOD PRESSURE: 106 MMHG | RESPIRATION RATE: 18 BRPM | OXYGEN SATURATION: 99 % | BODY MASS INDEX: 22.91 KG/M2 | WEIGHT: 146 LBS | HEART RATE: 89 BPM

## 2024-08-13 DIAGNOSIS — Z90.49 STATUS POST APPENDECTOMY: Primary | ICD-10-CM

## 2024-08-13 PROCEDURE — 99024 POSTOP FOLLOW-UP VISIT: CPT | Performed by: NURSE PRACTITIONER

## 2024-08-13 ASSESSMENT — PAIN SCALES - GENERAL: PAINLEVEL: NO PAIN (0)

## 2024-08-13 NOTE — PROGRESS NOTES
"CLINIC NOTE - POST-OP SURGERY  8/13/2024    Patient:Dilcia Santillan    Procedure: Appendectomy  laparoscopic     This is a 54 year old female who is 3 weeks s/p Appendectomy  laparoscopic .  The patient has no complaints today in general.     Current Medications:  Current Outpatient Medications   Medication Sig Dispense Refill    cyclobenzaprine (FLEXERIL) 10 MG tablet Take 0.5-1 tablets (5-10 mg) by mouth 3 times daily as needed for muscle spasms 30 tablet 1    DULoxetine (CYMBALTA) 30 MG capsule Take 30 mg by mouth every evening      FLUoxetine (PROZAC) 20 MG capsule Take 20 mg by mouth every evening      topiramate (TOPAMAX) 100 MG tablet Take 100 mg by mouth 2 times daily       vitamin B-12 (CYANOCOBALAMIN) 1000 MCG tablet Take 1,000 mcg by mouth every evening      vitamin D3 (CHOLECALCIFEROL) 2000 units tablet Take 2,000 Units by mouth every evening         Allergies:  Allergies   Allergen Reactions    Food Swelling     Vanilla Blackberry Dannon Yogurt--felt throat swelling per Dr. Navarrete note in Care Everywhere    Morphine Sulfate Nausea    Nortriptyline      Weight gain         PHYSICAL EXAM:   Vital signs: /64 (BP Location: Left arm, Cuff Size: Adult Regular)   Pulse 89   Temp 98.3  F (36.8  C) (Tympanic)   Resp 18   Ht 1.702 m (5' 7\")   Wt 66.2 kg (146 lb)   SpO2 99%   BMI 22.87 kg/m     BMI: Body mass index is 22.87 kg/m .   General: Normal, healthy, cooperative, in no acute distress, alert   Abdominal: non-distended, soft, non-tender to palpation   Wounds:  Well healed surgical scars consistent with her operation.     PATHOLOGY:  Case Report   Date Value Ref Range Status   07/27/2024   Final    Surgical Pathology Report                         Case: [id number]                                  Authorizing Provider:  Sam Grimm MD        Collected:           07/27/2024 10:17 PM          Ordering Location:     [facility] Main Operating Room     Received:            07/29/2024 01:00 PM        "   Pathologist:           Louie Burrell DO                                                         Specimen:    Appendix                                                                                    Final Diagnosis   Date Value Ref Range Status   07/27/2024   Final    A.  Appendix, appendectomy:  -Acute appendicitis and fibrinopurulent serositis.          ASSESSMENT:    54 year old female who is 3 weeks s/p Appendectomy  laparoscopic .  Doing well.     PLAN:    Follow-up as needed

## 2025-04-27 ENCOUNTER — HOSPITAL ENCOUNTER (EMERGENCY)
Facility: HOSPITAL | Age: 55
Discharge: HOME | End: 2025-04-27
Attending: NURSE PRACTITIONER
Payer: COMMERCIAL

## 2025-04-27 VITALS — TEMPERATURE: 98.3 F | RESPIRATION RATE: 18 BRPM | HEART RATE: 107 BPM | OXYGEN SATURATION: 100 %

## 2025-04-27 DIAGNOSIS — S81.011A LACERATION OF SKIN OF RIGHT KNEE, INITIAL ENCOUNTER: Primary | ICD-10-CM

## 2025-04-27 DIAGNOSIS — M25.561 PAIN AND SWELLING OF KNEE, RIGHT: ICD-10-CM

## 2025-04-27 DIAGNOSIS — M25.461 PAIN AND SWELLING OF KNEE, RIGHT: ICD-10-CM

## 2025-04-27 PROCEDURE — 99213 OFFICE O/P EST LOW 20 MIN: CPT | Performed by: NURSE PRACTITIONER

## 2025-04-27 PROCEDURE — G0463 HOSPITAL OUTPT CLINIC VISIT: HCPCS | Mod: 25

## 2025-04-27 PROCEDURE — 250N000013 HC RX MED GY IP 250 OP 250 PS 637: Performed by: NURSE PRACTITIONER

## 2025-04-27 PROCEDURE — 250N000011 HC RX IP 250 OP 636: Performed by: NURSE PRACTITIONER

## 2025-04-27 PROCEDURE — 90471 IMMUNIZATION ADMIN: CPT | Performed by: NURSE PRACTITIONER

## 2025-04-27 PROCEDURE — 90715 TDAP VACCINE 7 YRS/> IM: CPT | Performed by: NURSE PRACTITIONER

## 2025-04-27 RX ORDER — CEPHALEXIN 500 MG/1
500 CAPSULE ORAL 4 TIMES DAILY
Qty: 28 CAPSULE | Refills: 0 | Status: SHIPPED | OUTPATIENT
Start: 2025-04-27 | End: 2025-05-04

## 2025-04-27 RX ORDER — HYDROCODONE BITARTRATE AND ACETAMINOPHEN 5; 325 MG/1; MG/1
1 TABLET ORAL EVERY 6 HOURS PRN
Qty: 6 TABLET | Refills: 0 | Status: SHIPPED | OUTPATIENT
Start: 2025-04-27

## 2025-04-27 RX ORDER — IBUPROFEN 600 MG/1
600 TABLET, FILM COATED ORAL ONCE
Status: COMPLETED | OUTPATIENT
Start: 2025-04-27 | End: 2025-04-27

## 2025-04-27 RX ADMIN — IBUPROFEN 600 MG: 600 TABLET, FILM COATED ORAL at 17:02

## 2025-04-27 RX ADMIN — CLOSTRIDIUM TETANI TOXOID ANTIGEN (FORMALDEHYDE INACTIVATED), CORYNEBACTERIUM DIPHTHERIAE TOXOID ANTIGEN (FORMALDEHYDE INACTIVATED), BORDETELLA PERTUSSIS TOXOID ANTIGEN (GLUTARALDEHYDE INACTIVATED), BORDETELLA PERTUSSIS FILAMENTOUS HEMAGGLUTININ ANTIGEN (FORMALDEHYDE INACTIVATED), BORDETELLA PERTUSSIS PERTACTIN ANTIGEN, AND BORDETELLA PERTUSSIS FIMBRIAE 2/3 ANTIGEN 0.5 ML: 5; 2; 2.5; 5; 3; 5 INJECTION, SUSPENSION INTRAMUSCULAR at 17:02

## 2025-04-27 ASSESSMENT — COLUMBIA-SUICIDE SEVERITY RATING SCALE - C-SSRS
2. HAVE YOU ACTUALLY HAD ANY THOUGHTS OF KILLING YOURSELF IN THE PAST MONTH?: NO
1. IN THE PAST MONTH, HAVE YOU WISHED YOU WERE DEAD OR WISHED YOU COULD GO TO SLEEP AND NOT WAKE UP?: NO
6. HAVE YOU EVER DONE ANYTHING, STARTED TO DO ANYTHING, OR PREPARED TO DO ANYTHING TO END YOUR LIFE?: NO

## 2025-04-27 ASSESSMENT — ENCOUNTER SYMPTOMS
FEVER: 0
JOINT SWELLING: 1
CHILLS: 0
COLOR CHANGE: 0
WOUND: 1
MYALGIAS: 1

## 2025-04-27 ASSESSMENT — ACTIVITIES OF DAILY LIVING (ADL): ADLS_ACUITY_SCORE: 52

## 2025-04-27 NOTE — ED PROVIDER NOTES
History     Chief Complaint   Patient presents with    Laceration    Knee Pain     HPI  Dilcia Santillan is a 54 year old female who presents to urgent care with concerns of a right knee infection.  Last night patient states that she was ripping up carpet using a  when the  accidentally slipped and cut her to her right knee.  Wound was fairly superficial so she cleaned it up and has been applying a Band-Aid over it.  She started noticing some swelling to her knee last night which is worsened today.  Also notes that there is increased pain to her knee.  Hurts to apply pressure to her leg but she is still able to ambulate.  Denies any fevers.  She last took Tylenol earlier this morning.  Notes attempted to take ibuprofen but did not appear to be helping much with her pain.    The patient states that the carpet that she was using a  on was moldy and she is concerned about infection.    Allergies:  Allergies   Allergen Reactions    Food Swelling     Vanilla Blackberry Dannon Yogurt--felt throat swelling per Dr. Navarrete note in Care Everywhere    Morphine Sulfate Nausea    Nortriptyline      Weight gain         Problem List:    Patient Active Problem List    Diagnosis Date Noted    Acute appendicitis with perforation, localized peritonitis, abscess, and gangrene 07/27/2024     Priority: Medium    Acute appendicitis with generalized peritonitis without gangrene, perforation, or abscess 07/27/2024     Priority: Medium    Acute appendicitis with localized peritonitis, without perforation, abscess, or gangrene 07/27/2024     Priority: Medium    Chronic pain of left knee 01/17/2023     Priority: Medium    Medial epicondylitis of both elbows 12/23/2021     Priority: Medium    CAP (community acquired pneumonia) 03/29/2020     Priority: Medium    Fibromyalgia 03/18/2020     Priority: Medium    Long term (current) use of opiate analgesic 03/18/2020     Priority: Medium     Overview:   OPIOID DRUG  AGREEMENT FORM  Opioid Agreement Date: 08/10/2011  Last UDT (Urine Drug Test) on date:  2/4/06  Pain Dx: Low Back Pain  Last Pain Visit: 8/16/10  Preferred Pharmacy: [facility] in [place]  Comments: Future appointment 9/22/10 with Dr. TONI Azevedo & 11/17/10 with Dr. 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   IMO Update 10/11      Chronic bilateral low back pain without sciatica 04/27/2007     Priority: Medium     Overview:   IMO Update 10/11      Migraine without intractable migraine 08/23/2006     Priority: Medium     Overview:   IMO Update 10/11          Past Medical History:    Past Medical History:   Diagnosis Date    Migraines        Past Surgical History:    Past Surgical History:   Procedure Laterality Date    LAPAROSCOPIC APPENDECTOMY N/A 7/27/2024    Procedure: APPENDECTOMY, LAPAROSCOPIC;  Surgeon: Sam Grimm MD;  Location: [facility] OR       Family History:    History reviewed. No pertinent family history.    Social History:  Marital Status:   [2]  Social History     Tobacco Use    Smoking status: Never    Smokeless tobacco: Never   Substance Use Topics    Alcohol use: No    Drug use: No        Medications:    cephALEXin (KEFLEX) 500 MG capsule  HYDROcodone-acetaminophen (NORCO) 5-325 MG tablet  cyclobenzaprine (FLEXERIL) 10 MG tablet  DULoxetine (CYMBALTA) 30 MG capsule  FLUoxetine (PROZAC)  20 MG capsule  topiramate (TOPAMAX) 100 MG tablet  vitamin B-12 (CYANOCOBALAMIN) 1000 MCG tablet  vitamin D3 (CHOLECALCIFEROL) 2000 units tablet          Review of Systems   Constitutional:  Negative for chills and fever.   Musculoskeletal:  Positive for joint swelling and myalgias.   Skin:  Positive for wound. Negative for color change.   All other systems reviewed and are negative.      Physical Exam   Pulse: 107  Temp: 98.3  F (36.8  C)  Resp: 18  SpO2: 100 %      Physical Exam  Vitals and nursing note reviewed.   Constitutional:       General: She is not in acute distress.     Appearance: Normal appearance. She is well-developed. She is not diaphoretic.   HENT:      Head: Normocephalic and atraumatic.   Eyes:      Conjunctiva/sclera: Conjunctivae normal.   Cardiovascular:      Rate and Rhythm: Normal rate.   Pulmonary:      Effort: Pulmonary effort is normal. No respiratory distress.   Musculoskeletal:      Cervical back: Normal range of motion and neck supple.        Legs:       Comments: 1 cm laceration to anterior right knee slightly gaping.  Appears to be healing by secondary intention at this time.  Dried blood to this area.  No significant erythema.  No fluctuance.  No drainage.  Right knee is swollen.  It is not warm to the touch.  Mildly decreased range of motion to the right knee.  Distal pulses intact.   Skin:     General: Skin is warm and dry.      Coloration: Skin is not pale.      Findings: No bruising, erythema or lesion.   Neurological:      Mental Status: She is alert and oriented to person, place, and time.         ED Course        Procedures    No results found for this or any previous visit (from the past 24 hours).    Medications   Tdap (tetanus-diphtheria-acell pertussis) (ADACEL) injection 0.5 mL (0.5 mLs Intramuscular $Given 4/27/25 1702)   ibuprofen (ADVIL/MOTRIN) tablet 600 mg (600 mg Oral $Given 4/27/25 1702)       Assessments & Plan (with Medical Decision Making)   This is a  54-year-old female that accidentally cut her self with a  resulting in a superficial laceration to the anterior right knee.  She cleaned her wound but has now developed right knee pain and swelling.  Denies any trauma or injury to the knee.  Wound appears superficial but slightly open.  Healing by secondary intention.  No indication for repair with sutures at this time.  No significant redness.  The knee is not warm to the touch.  Mildly decreased range of motion to the knee.  Patient states that the carpet she was using a  on was moldy.    Findings were discussed with patient.  Concern for local wound infection given the progression of her symptoms overnight.  The wound was cleaned today and dressed.  Her tetanus was updated today.  Ace wrap applied to her knee.  Will treat with cephalexin.  She was encouraged to continue taking ibuprofen as needed for pain.  Will prescribe a limited prescription of hydrocodone as needed for severe pain.  Strict return precautions to UC/ER discussed with patient who verbalized understanding.  She will follow-up with her primary doctor as needed.    I have reviewed the nursing notes.    I have reviewed the findings, diagnosis, plan and need for follow up with the patient.  This document was prepared using a combination of typing and voice generated software.  While every attempt was made for accuracy, spelling and grammatical errors may exist.         Discharge Medication List as of 4/27/2025  5:09 PM        START taking these medications    Details   cephALEXin (KEFLEX) 500 MG capsule Take 1 capsule (500 mg) by mouth 4 times daily for 7 days., Disp-28 capsule, R-0, E-Prescribe      HYDROcodone-acetaminophen (NORCO) 5-325 MG tablet Take 1 tablet by mouth every 6 hours as needed for severe pain., Disp-6 tablet, R-0, E-Prescribe             Final diagnoses:   Laceration of skin of right knee, initial encounter   Pain and swelling of knee, right       4/27/2025   [facility]  EMERGENCY DEPARTMENT       Mpofu, Prudence, CNP  04/27/25 2771

## 2025-04-27 NOTE — DISCHARGE INSTRUCTIONS
Clean your wound with mild soap and water, dry thoroughly.  Apply over-the-counter triple antibiotic ointment over the wound.  Keep your wound covered when you are out and about.  May occasionally leave it open to air dry.    Use the Ace wrap to your knee to help with the swelling, elevate your knee at rest and apply ice packs.    Take the antibiotic as prescribed.  Ibuprofen as needed for pain.  Hydrocodone as needed for severe pain.    Follow-up with your primary doctor if no improvement in symptoms.  Return to urgent care or emergency department for any worsening or concerning symptoms.

## 2025-04-27 NOTE — ED TRIAGE NOTES
Pt presents today with c/o laceration to right knee from a box cuter yesterday, States its very painful today and hard to walk on      Triage Assessment (Adult)       Row Name 04/27/25 9120          Triage Assessment    Airway WDL WDL        Respiratory WDL    Respiratory WDL WDL        Peripheral/Neurovascular WDL    Peripheral Neurovascular WDL WDL

## (undated) DEVICE — BLANKET BAIR HUGGER UPPER BODY 42268

## (undated) DEVICE — PREP CHLORAPREP 26ML TINTED HI-LITE ORANGE 930815

## (undated) DEVICE — SOL NACL 0.9% IRRIG 1000ML BOTTLE 2F7124

## (undated) DEVICE — ELECTRODE CAUTERY LAP L-HOOK W/SHAFT 33CM 0020S

## (undated) DEVICE — ESU CORD MONOPOLAR 10'  E0510

## (undated) DEVICE — STPL POWERED ECHELON 45MM PSEE45A

## (undated) DEVICE — ESU LIGASURE MARYLAND LAPAROSCOPIC SLR/DVDR 5MMX37CM LF1937

## (undated) DEVICE — TROCAR-12X100MM KII FIOS

## (undated) DEVICE — PACK LAPAROSCOPY CUSTOM SBACNLSMBF

## (undated) DEVICE — SU VICRYL 0 UR-6 27" J603H

## (undated) DEVICE — GOWN SURG XL LVL 3 REINFORCED 9541

## (undated) DEVICE — CANISTER SUCTION MEDI-VAC GUARDIAN 2000ML 90D 65651-220

## (undated) DEVICE — SCISSOR-ENDOPATH 5MM CURVED

## (undated) DEVICE — SUCTION STRYKERFLOW II 250-070-500

## (undated) DEVICE — LABEL STERILE PREPRINTED FOR OR FRRH01-2M

## (undated) DEVICE — TUBE NASOGASTRIC 18FR 48" 2 LUMEN 8888266148

## (undated) DEVICE — DRSG STERI STRIP 1/2X4" R1547

## (undated) DEVICE — COVER LT HANDLE 2/PK 5160-2FG

## (undated) DEVICE — TUBING INSUFFLATION INSUFFLATOR FILTER HIGH FLOW 031322-01

## (undated) DEVICE — ESU GROUND PAD ADULT W/CORD E7507

## (undated) DEVICE — ENDO TROCAR SLEEVE KII ADV FIXATION 05X100MM CFS02

## (undated) DEVICE — GLV 7.5 BIOGEL LATEX 30475-01

## (undated) DEVICE — PACK BASIN SET UP SUTCNBSBBA

## (undated) DEVICE — SLEEVE SCD EXPRESS KNEE LENGTH MED 9529

## (undated) DEVICE — ENDO TROCAR FIRST ENTRY KII FIOS ADV FIX 05X100MM CFF03

## (undated) DEVICE — ENDO TROCAR BLUNT TIP KII BALLOON 12X100MM C0R47

## (undated) DEVICE — STPL RELOAD REG TISSUE ECHELON 45 X 3.6MM BLUE GST45B

## (undated) DEVICE — ENDO POUCH UNIV RETRIEVAL SYSTEM INZII 10MM CD001

## (undated) DEVICE — PUNCTURE CLOSURE DEVICE PMITCSG

## (undated) DEVICE — WATER STERILE 1000ML STERILE UROMATIC 2B-71-14

## (undated) RX ORDER — PROPOFOL 10 MG/ML
INJECTION, EMULSION INTRAVENOUS
Status: DISPENSED
Start: 2024-07-27

## (undated) RX ORDER — ONDANSETRON 2 MG/ML
INJECTION INTRAMUSCULAR; INTRAVENOUS
Status: DISPENSED
Start: 2024-07-27

## (undated) RX ORDER — DEXAMETHASONE SODIUM PHOSPHATE 10 MG/ML
INJECTION, SOLUTION INTRAMUSCULAR; INTRAVENOUS
Status: DISPENSED
Start: 2024-07-27

## (undated) RX ORDER — KETOROLAC TROMETHAMINE 30 MG/ML
INJECTION, SOLUTION INTRAMUSCULAR; INTRAVENOUS
Status: DISPENSED
Start: 2024-07-27

## (undated) RX ORDER — FENTANYL CITRATE 50 UG/ML
INJECTION, SOLUTION INTRAMUSCULAR; INTRAVENOUS
Status: DISPENSED
Start: 2024-07-27